# Patient Record
Sex: FEMALE | Race: BLACK OR AFRICAN AMERICAN | NOT HISPANIC OR LATINO | Employment: OTHER | URBAN - METROPOLITAN AREA
[De-identification: names, ages, dates, MRNs, and addresses within clinical notes are randomized per-mention and may not be internally consistent; named-entity substitution may affect disease eponyms.]

---

## 2024-08-26 ENCOUNTER — OFFICE VISIT (OUTPATIENT)
Dept: PRIMARY CARE | Facility: CLINIC | Age: 64
End: 2024-08-26
Payer: COMMERCIAL

## 2024-08-26 VITALS
WEIGHT: 126.8 LBS | HEART RATE: 88 BPM | SYSTOLIC BLOOD PRESSURE: 160 MMHG | RESPIRATION RATE: 16 BRPM | OXYGEN SATURATION: 99 % | TEMPERATURE: 97.2 F | BODY MASS INDEX: 23.34 KG/M2 | DIASTOLIC BLOOD PRESSURE: 82 MMHG | HEIGHT: 62 IN

## 2024-08-26 DIAGNOSIS — E78.5 DYSLIPIDEMIA: ICD-10-CM

## 2024-08-26 DIAGNOSIS — I10 HYPERTENSION, UNSPECIFIED TYPE: ICD-10-CM

## 2024-08-26 DIAGNOSIS — Z23 VACCINE FOR DIPHTHERIA-TETANUS: Primary | ICD-10-CM

## 2024-08-26 DIAGNOSIS — I25.85 CHRONIC CORONARY MICROVASCULAR DYSFUNCTION: ICD-10-CM

## 2024-08-26 DIAGNOSIS — Z12.31 BREAST CANCER SCREENING BY MAMMOGRAM: ICD-10-CM

## 2024-08-26 DIAGNOSIS — E11.69 TYPE 2 DIABETES MELLITUS WITH OTHER SPECIFIED COMPLICATION, WITHOUT LONG-TERM CURRENT USE OF INSULIN (MULTI): ICD-10-CM

## 2024-08-26 LAB
ALBUMIN SERPL BCP-MCNC: 4.1 G/DL (ref 3.4–5)
ALP SERPL-CCNC: 53 U/L (ref 33–136)
ALT SERPL W P-5'-P-CCNC: 7 U/L (ref 7–45)
ANION GAP SERPL CALC-SCNC: 14 MMOL/L (ref 10–20)
AST SERPL W P-5'-P-CCNC: 14 U/L (ref 9–39)
BASOPHILS # BLD AUTO: 0.02 X10*3/UL (ref 0–0.1)
BASOPHILS NFR BLD AUTO: 0.3 %
BILIRUB SERPL-MCNC: 0.3 MG/DL (ref 0–1.2)
BUN SERPL-MCNC: 28 MG/DL (ref 6–23)
CALCIUM SERPL-MCNC: 9.7 MG/DL (ref 8.6–10.6)
CHLORIDE SERPL-SCNC: 105 MMOL/L (ref 98–107)
CHOLEST SERPL-MCNC: 183 MG/DL (ref 0–199)
CHOLESTEROL/HDL RATIO: 3.6
CO2 SERPL-SCNC: 25 MMOL/L (ref 21–32)
CREAT SERPL-MCNC: 1.99 MG/DL (ref 0.5–1.05)
EGFRCR SERPLBLD CKD-EPI 2021: 27 ML/MIN/1.73M*2
EOSINOPHIL # BLD AUTO: 0.06 X10*3/UL (ref 0–0.7)
EOSINOPHIL NFR BLD AUTO: 1 %
ERYTHROCYTE [DISTWIDTH] IN BLOOD BY AUTOMATED COUNT: 13.9 % (ref 11.5–14.5)
EST. AVERAGE GLUCOSE BLD GHB EST-MCNC: 128 MG/DL
GLUCOSE SERPL-MCNC: 83 MG/DL (ref 74–99)
HBA1C MFR BLD: 6.1 %
HCT VFR BLD AUTO: 31.6 % (ref 36–46)
HDLC SERPL-MCNC: 51 MG/DL
HGB BLD-MCNC: 10.1 G/DL (ref 12–16)
IMM GRANULOCYTES # BLD AUTO: 0.02 X10*3/UL (ref 0–0.7)
IMM GRANULOCYTES NFR BLD AUTO: 0.3 % (ref 0–0.9)
LDLC SERPL CALC-MCNC: 103 MG/DL
LYMPHOCYTES # BLD AUTO: 2.05 X10*3/UL (ref 1.2–4.8)
LYMPHOCYTES NFR BLD AUTO: 35.6 %
MCH RBC QN AUTO: 30 PG (ref 26–34)
MCHC RBC AUTO-ENTMCNC: 32 G/DL (ref 32–36)
MCV RBC AUTO: 94 FL (ref 80–100)
MONOCYTES # BLD AUTO: 0.48 X10*3/UL (ref 0.1–1)
MONOCYTES NFR BLD AUTO: 8.3 %
NEUTROPHILS # BLD AUTO: 3.13 X10*3/UL (ref 1.2–7.7)
NEUTROPHILS NFR BLD AUTO: 54.5 %
NON HDL CHOLESTEROL: 132 MG/DL (ref 0–149)
NRBC BLD-RTO: 0 /100 WBCS (ref 0–0)
PLATELET # BLD AUTO: 174 X10*3/UL (ref 150–450)
POTASSIUM SERPL-SCNC: 4.2 MMOL/L (ref 3.5–5.3)
PROT SERPL-MCNC: 7.5 G/DL (ref 6.4–8.2)
RBC # BLD AUTO: 3.37 X10*6/UL (ref 4–5.2)
SODIUM SERPL-SCNC: 140 MMOL/L (ref 136–145)
TRIGL SERPL-MCNC: 147 MG/DL (ref 0–149)
TSH SERPL-ACNC: 1.85 MIU/L (ref 0.44–3.98)
VLDL: 29 MG/DL (ref 0–40)
WBC # BLD AUTO: 5.8 X10*3/UL (ref 4.4–11.3)

## 2024-08-26 PROCEDURE — 3044F HG A1C LEVEL LT 7.0%: CPT | Performed by: INTERNAL MEDICINE

## 2024-08-26 PROCEDURE — 4010F ACE/ARB THERAPY RXD/TAKEN: CPT | Performed by: INTERNAL MEDICINE

## 2024-08-26 PROCEDURE — 80053 COMPREHEN METABOLIC PANEL: CPT | Performed by: INTERNAL MEDICINE

## 2024-08-26 PROCEDURE — 99214 OFFICE O/P EST MOD 30 MIN: CPT | Performed by: INTERNAL MEDICINE

## 2024-08-26 PROCEDURE — 1159F MED LIST DOCD IN RCRD: CPT | Performed by: INTERNAL MEDICINE

## 2024-08-26 PROCEDURE — 90471 IMMUNIZATION ADMIN: CPT | Performed by: INTERNAL MEDICINE

## 2024-08-26 PROCEDURE — 3079F DIAST BP 80-89 MM HG: CPT | Performed by: INTERNAL MEDICINE

## 2024-08-26 PROCEDURE — 36415 COLL VENOUS BLD VENIPUNCTURE: CPT | Performed by: INTERNAL MEDICINE

## 2024-08-26 PROCEDURE — 80061 LIPID PANEL: CPT | Performed by: INTERNAL MEDICINE

## 2024-08-26 PROCEDURE — 3049F LDL-C 100-129 MG/DL: CPT | Performed by: INTERNAL MEDICINE

## 2024-08-26 PROCEDURE — 83036 HEMOGLOBIN GLYCOSYLATED A1C: CPT | Performed by: INTERNAL MEDICINE

## 2024-08-26 PROCEDURE — 1126F AMNT PAIN NOTED NONE PRSNT: CPT | Performed by: INTERNAL MEDICINE

## 2024-08-26 PROCEDURE — 99214 OFFICE O/P EST MOD 30 MIN: CPT | Mod: 25,GC | Performed by: INTERNAL MEDICINE

## 2024-08-26 PROCEDURE — 84443 ASSAY THYROID STIM HORMONE: CPT | Performed by: INTERNAL MEDICINE

## 2024-08-26 PROCEDURE — 85025 COMPLETE CBC W/AUTO DIFF WBC: CPT | Performed by: INTERNAL MEDICINE

## 2024-08-26 PROCEDURE — 3008F BODY MASS INDEX DOCD: CPT | Performed by: INTERNAL MEDICINE

## 2024-08-26 PROCEDURE — 3077F SYST BP >= 140 MM HG: CPT | Performed by: INTERNAL MEDICINE

## 2024-08-26 RX ORDER — VALSARTAN 80 MG/1
80 TABLET ORAL
COMMUNITY
Start: 2024-04-18 | End: 2024-08-26 | Stop reason: SDUPTHER

## 2024-08-26 RX ORDER — ATORVASTATIN CALCIUM 10 MG/1
10 TABLET, FILM COATED ORAL
Qty: 30 TABLET | Refills: 2 | Status: SHIPPED | OUTPATIENT
Start: 2024-08-26 | End: 2024-11-24

## 2024-08-26 RX ORDER — ASPIRIN 81 MG/1
81 TABLET ORAL
COMMUNITY
Start: 2023-12-07 | End: 2024-08-26 | Stop reason: SDUPTHER

## 2024-08-26 RX ORDER — ATORVASTATIN CALCIUM 10 MG/1
10 TABLET, FILM COATED ORAL
COMMUNITY
Start: 2023-10-28 | End: 2024-08-26 | Stop reason: SDUPTHER

## 2024-08-26 RX ORDER — ASPIRIN 81 MG/1
81 TABLET ORAL
Qty: 30 TABLET | Refills: 2 | Status: SHIPPED | OUTPATIENT
Start: 2024-08-26 | End: 2024-11-24

## 2024-08-26 RX ORDER — ISOSORBIDE MONONITRATE 30 MG/1
1 TABLET, EXTENDED RELEASE ORAL DAILY
COMMUNITY
Start: 2024-01-15 | End: 2024-08-26 | Stop reason: SDUPTHER

## 2024-08-26 RX ORDER — VALSARTAN 80 MG/1
80 TABLET ORAL
Qty: 30 TABLET | Refills: 2 | Status: SHIPPED | OUTPATIENT
Start: 2024-08-26 | End: 2024-11-24

## 2024-08-26 RX ORDER — EMPAGLIFLOZIN 10 MG/1
10 TABLET, FILM COATED ORAL
COMMUNITY
Start: 2023-10-28 | End: 2024-08-26 | Stop reason: SDUPTHER

## 2024-08-26 RX ORDER — EMPAGLIFLOZIN 10 MG/1
10 TABLET, FILM COATED ORAL
Qty: 30 TABLET | Refills: 2 | Status: SHIPPED | OUTPATIENT
Start: 2024-08-26 | End: 2024-11-24

## 2024-08-26 RX ORDER — ISOSORBIDE MONONITRATE 30 MG/1
30 TABLET, EXTENDED RELEASE ORAL DAILY
Qty: 30 TABLET | Refills: 2 | Status: SHIPPED | OUTPATIENT
Start: 2024-08-26 | End: 2024-11-24

## 2024-08-26 SDOH — ECONOMIC STABILITY: FOOD INSECURITY: WITHIN THE PAST 12 MONTHS, THE FOOD YOU BOUGHT JUST DIDN'T LAST AND YOU DIDN'T HAVE MONEY TO GET MORE.: SOMETIMES TRUE

## 2024-08-26 SDOH — ECONOMIC STABILITY: FOOD INSECURITY: WITHIN THE PAST 12 MONTHS, THE FOOD YOU BOUGHT JUST DIDN'T LAST AND YOU DIDN'T HAVE MONEY TO GET MORE.: NEVER TRUE

## 2024-08-26 SDOH — ECONOMIC STABILITY: FOOD INSECURITY: WITHIN THE PAST 12 MONTHS, YOU WORRIED THAT YOUR FOOD WOULD RUN OUT BEFORE YOU GOT MONEY TO BUY MORE.: NEVER TRUE

## 2024-08-26 SDOH — ECONOMIC STABILITY: FOOD INSECURITY: WITHIN THE PAST 12 MONTHS, YOU WORRIED THAT YOUR FOOD WOULD RUN OUT BEFORE YOU GOT MONEY TO BUY MORE.: SOMETIMES TRUE

## 2024-08-26 ASSESSMENT — PATIENT HEALTH QUESTIONNAIRE - PHQ9
SUM OF ALL RESPONSES TO PHQ9 QUESTIONS 1 AND 2: 0
1. LITTLE INTEREST OR PLEASURE IN DOING THINGS: NOT AT ALL
2. FEELING DOWN, DEPRESSED OR HOPELESS: NOT AT ALL

## 2024-08-26 ASSESSMENT — PAIN SCALES - GENERAL: PAINLEVEL: 0-NO PAIN

## 2024-08-26 ASSESSMENT — LIFESTYLE VARIABLES: HOW MANY STANDARD DRINKS CONTAINING ALCOHOL DO YOU HAVE ON A TYPICAL DAY: PATIENT DOES NOT DRINK

## 2024-08-26 ASSESSMENT — ENCOUNTER SYMPTOMS
OCCASIONAL FEELINGS OF UNSTEADINESS: 0
DEPRESSION: 0
LOSS OF SENSATION IN FEET: 0

## 2024-08-26 NOTE — PROGRESS NOTES
Chief complaint:    HPI:  Cornelia Macias is a 64 y.o. female history notable for CHF, HLD, DM2, CAD, depression, hepatitis C tx Mavyret 2018 with SVR (hepatitis C is cured, with last RNA in 2021 undetectable.)Here to establish care    Pt denies any CP, cough, fever, diarrhoea, dizziness, lightheadedness, weight loss (reports some weight gain)    She reports pain in both arms and legs, sometimes has some pinching sensation in the legs, but not in the feet, reports swelling in the Rt foot >Lt, not in the legs usually worse in the cold, improves with warmth and drinking warm water    Denies nausea, vomiting, abdominal pain, diarrhea, and constipation.    Medications:      Current Outpatient Medications:     aspirin 81 mg EC tablet, Take 1 tablet (81 mg) by mouth once daily., Disp: 30 tablet, Rfl: 2    atorvastatin (Lipitor) 10 mg tablet, Take 1 tablet (10 mg) by mouth once daily., Disp: 30 tablet, Rfl: 2    diphth,pertus,acell,,tetanus (BoostRIX) 2.5-8-5 Lf-mcg-Lf/0.5mL injection, Inject 0.5 mL into the muscle 1 time for 1 dose., Disp: 0.5 mL, Rfl: 0    isosorbide mononitrate ER (Imdur) 30 mg 24 hr tablet, Take 1 tablet (30 mg) by mouth once daily., Disp: 30 tablet, Rfl: 2    Jardiance 10 mg, Take 1 tablet (10 mg) by mouth once daily., Disp: 30 tablet, Rfl: 2    valsartan (Diovan) 80 mg tablet, Take 1 tablet (80 mg) by mouth once daily., Disp: 30 tablet, Rfl: 2    Allergies:  Allergies   Allergen Reactions    Lisinopril Cough       Social history:   reports that she has been smoking cigarettes. She has never used smokeless tobacco. She reports that she does not currently use alcohol. She reports that she does not use drugs.Smokes tobacco, a pack in 2 days, since 2016  No recent use of alcohol or IVDU for the past 35 years    Health maintenance:  Health Maintenance   Topic Date Due    Echocardiogram  Never done    Yearly Adult Physical  Never done    Bone Density Scan  Never done    Zoster Vaccines (1 of 2) Never done     MMR Vaccines (1 of 1 - Standard series) Never done    Hepatitis B Vaccines (1 of 3 - Risk 3-dose series) Never done    DTaP/Tdap/Td Vaccines (4 - Td or Tdap) 06/24/2020    Colorectal Cancer Screening  01/19/2024    COVID-19 Vaccine (5 - 2023-24 season) 04/07/2024    Influenza Vaccine (1) 09/01/2024    Mammogram  09/27/2024    Diabetes: Hemoglobin A1C  12/21/2024    Diabetes Screening  12/21/2024    Creatinine Level  06/17/2025    Potassium Level  06/17/2025    Lipid Panel  01/03/2028    RSV Pregnant patients and/or  patients aged 60+ years  Completed    Hepatitis A Vaccines  Completed    Pneumococcal Vaccine: 65+ Years  Completed    HIB Vaccines  Aged Out    IPV Vaccines  Aged Out    Meningococcal Vaccine  Aged Out    Rotavirus Vaccines  Aged Out    HPV Vaccines  Aged Out       Vitals:  Visit Vitals  /82   Pulse 88   Temp 36.2 °C (97.2 °F)   Resp 16       Physical exam:  CVS: S1 and S2 present , no added sounds,  Chest: Clinically clear  Abd: no areas of tenderness or distentention  Extremities: no pedal edema, pulse palpable  Neuro: A0X 3, No FND      Lab Results   Component Value Date    HGBA1C 6.2 (H) 12/21/2023       Assessment and plan:  Cornelia Macias is a 64 y.o. female here to establish care.     #Hx of CAD  #Ischemic cardiomyopathy   ::CAD 3-vessel disease cath 10/18/2023: mid LAD 85% + 90%; Diag#1 75%, midCirc 65%, midRCA 80%  ::LVH and old Septal MI by EKG  ::HFmEF 45% Echo 7/5/2023  ::Hyperlipidemia  on 10/19/2023,   :: Last Cardiologist visit, she did not have any symptoms, noted to be very high risk for surgery. Pt was not interested in any invasive procedure yet.   Plan:   -Will repeat labs (CBC, CMP, lipid profile)  -c/w Imdur, Jardiace, Atorvastatin  -follow up with Cardiology    # DM2  # HLD  ::BMI-23.19  ::BP-160/82   ::HBAIC- 6.2 (2023)  stable  ::On Jardiance 10mg, Imdur 30mg daily, Atorvastatin 10mg daily, Diovan 80mg daily, Aspirin 81 mg daily    Plan:  -c/w Home meds  -Follow  up with Cardiologist  -repeat CMP, HBA1C, lipid profile will need high intensity statin iso CAD    #CKD   ::Creat 2.016/17/2024)<<1.5  ::eGFR-27  ::Hb-10.5(6/17/2024)  Plan:  -Will follow up repeat labs      # History of Hepatitis C, cured  # History of liver fibrosis 2/2 above  - follows up with Gastroenterology (Fibroscan and Liver US ordered to evaluate for cirrhosis on last visit)  Plan:   -c/w Gastro follow up      HEALTH MAINTENANCE   Vaccines  Influenza: Nov 2023, advised to get next dose at pharmacy or on next visit here  Shingles: Ist shot 7/02, next shot 8/27   Tdap: 2010, due for a shot (given-8/26/2024)  Pneumonia: received PCV 20 11/20/2023  COVID: 2 doses in 2021  Cancer screening   Colonoscopy: FIT 2023, patient agreeable to colonoscopy, will discuss on next visit  Mammogram: 2023 , due requested  Low dose Chest CT: will discuss on next visit  DEXA scan:will discuss on next visit    Plan   CBC,CMP, HBA1C, Mammogram, Tdap vaccine  -Refill meds  Follow-up in 1mth    Patient and plan discussed with attending physician Dr Keisha Noel MD  [facility]

## 2024-09-23 ENCOUNTER — OFFICE VISIT (OUTPATIENT)
Dept: PRIMARY CARE | Facility: CLINIC | Age: 64
End: 2024-09-23
Payer: COMMERCIAL

## 2024-09-23 VITALS
SYSTOLIC BLOOD PRESSURE: 151 MMHG | TEMPERATURE: 98 F | DIASTOLIC BLOOD PRESSURE: 73 MMHG | WEIGHT: 128.6 LBS | BODY MASS INDEX: 23.66 KG/M2 | OXYGEN SATURATION: 98 % | HEART RATE: 102 BPM | HEIGHT: 62 IN | RESPIRATION RATE: 16 BRPM

## 2024-09-23 DIAGNOSIS — N18.30 TYPE 2 DIABETES MELLITUS WITH STAGE 3 CHRONIC KIDNEY DISEASE, WITHOUT LONG-TERM CURRENT USE OF INSULIN, UNSPECIFIED WHETHER STAGE 3A OR 3B CKD (MULTI): ICD-10-CM

## 2024-09-23 DIAGNOSIS — N18.4 CHRONIC KIDNEY DISEASE (CKD), STAGE IV (SEVERE) (MULTI): ICD-10-CM

## 2024-09-23 DIAGNOSIS — I42.9 CARDIOMYOPATHY, UNSPECIFIED TYPE (MULTI): ICD-10-CM

## 2024-09-23 DIAGNOSIS — B18.2 HEPATITIS C CARRIER (MULTI): ICD-10-CM

## 2024-09-23 DIAGNOSIS — I10 HYPERTENSION, UNSPECIFIED TYPE: ICD-10-CM

## 2024-09-23 DIAGNOSIS — I50.22 CHRONIC SYSTOLIC CHF (CONGESTIVE HEART FAILURE): ICD-10-CM

## 2024-09-23 DIAGNOSIS — R25.2 BILATERAL LEG CRAMPS: Primary | ICD-10-CM

## 2024-09-23 DIAGNOSIS — E78.2 MIXED HYPERLIPIDEMIA: ICD-10-CM

## 2024-09-23 DIAGNOSIS — E11.22 TYPE 2 DIABETES MELLITUS WITH STAGE 3 CHRONIC KIDNEY DISEASE, WITHOUT LONG-TERM CURRENT USE OF INSULIN, UNSPECIFIED WHETHER STAGE 3A OR 3B CKD (MULTI): ICD-10-CM

## 2024-09-23 DIAGNOSIS — Z23 NEEDS FLU SHOT: ICD-10-CM

## 2024-09-23 PROBLEM — I25.10 CORONARY ATHEROSCLEROSIS: Status: ACTIVE | Noted: 2023-10-18

## 2024-09-23 PROCEDURE — 3044F HG A1C LEVEL LT 7.0%: CPT | Performed by: INTERNAL MEDICINE

## 2024-09-23 PROCEDURE — 3008F BODY MASS INDEX DOCD: CPT | Performed by: INTERNAL MEDICINE

## 2024-09-23 PROCEDURE — 1125F AMNT PAIN NOTED PAIN PRSNT: CPT | Performed by: INTERNAL MEDICINE

## 2024-09-23 PROCEDURE — 3049F LDL-C 100-129 MG/DL: CPT | Performed by: INTERNAL MEDICINE

## 2024-09-23 PROCEDURE — 99214 OFFICE O/P EST MOD 30 MIN: CPT | Mod: 25 | Performed by: INTERNAL MEDICINE

## 2024-09-23 PROCEDURE — 3078F DIAST BP <80 MM HG: CPT | Performed by: INTERNAL MEDICINE

## 2024-09-23 PROCEDURE — 99214 OFFICE O/P EST MOD 30 MIN: CPT | Performed by: INTERNAL MEDICINE

## 2024-09-23 PROCEDURE — 3077F SYST BP >= 140 MM HG: CPT | Performed by: INTERNAL MEDICINE

## 2024-09-23 PROCEDURE — 4010F ACE/ARB THERAPY RXD/TAKEN: CPT | Performed by: INTERNAL MEDICINE

## 2024-09-23 PROCEDURE — 1160F RVW MEDS BY RX/DR IN RCRD: CPT | Performed by: INTERNAL MEDICINE

## 2024-09-23 PROCEDURE — 1159F MED LIST DOCD IN RCRD: CPT | Performed by: INTERNAL MEDICINE

## 2024-09-23 PROCEDURE — 90471 IMMUNIZATION ADMIN: CPT | Performed by: INTERNAL MEDICINE

## 2024-09-23 RX ORDER — CARVEDILOL 6.25 MG/1
6.25 TABLET ORAL 2 TIMES DAILY
Qty: 60 TABLET | Refills: 2 | Status: SHIPPED | OUTPATIENT
Start: 2024-09-23 | End: 2025-09-23

## 2024-09-23 SDOH — ECONOMIC STABILITY: FOOD INSECURITY: WITHIN THE PAST 12 MONTHS, YOU WORRIED THAT YOUR FOOD WOULD RUN OUT BEFORE YOU GOT MONEY TO BUY MORE.: SOMETIMES TRUE

## 2024-09-23 SDOH — ECONOMIC STABILITY: FOOD INSECURITY: WITHIN THE PAST 12 MONTHS, THE FOOD YOU BOUGHT JUST DIDN'T LAST AND YOU DIDN'T HAVE MONEY TO GET MORE.: SOMETIMES TRUE

## 2024-09-23 ASSESSMENT — ENCOUNTER SYMPTOMS
DEPRESSION: 0
CHILLS: 0
FEVER: 0
NAUSEA: 0
SHORTNESS OF BREATH: 0
ABDOMINAL PAIN: 0
OCCASIONAL FEELINGS OF UNSTEADINESS: 0
VOMITING: 0
LOSS OF SENSATION IN FEET: 0

## 2024-09-23 ASSESSMENT — PATIENT HEALTH QUESTIONNAIRE - PHQ9
1. LITTLE INTEREST OR PLEASURE IN DOING THINGS: NOT AT ALL
SUM OF ALL RESPONSES TO PHQ9 QUESTIONS 1 AND 2: 0
2. FEELING DOWN, DEPRESSED OR HOPELESS: NOT AT ALL

## 2024-09-23 ASSESSMENT — LIFESTYLE VARIABLES: HOW MANY STANDARD DRINKS CONTAINING ALCOHOL DO YOU HAVE ON A TYPICAL DAY: PATIENT DOES NOT DRINK

## 2024-09-23 ASSESSMENT — PAIN SCALES - GENERAL: PAINLEVEL: 5

## 2024-09-23 NOTE — PROGRESS NOTES
"Subjective   Patient ID: Cornelia Macias is a 64 y.o. female who presents for Follow-up (1 month).    Presents for 1 month follow up. Complains of cramping under her right breast and both feet. Happens most of the time when she is cold. Standing up eases the cramps in her feet. Does cramps in the bed at night. Takes the statin in the daytime.          Review of Systems   Constitutional:  Negative for chills and fever.   Respiratory:  Negative for shortness of breath.    Cardiovascular:  Negative for chest pain.   Gastrointestinal:  Negative for abdominal pain, nausea and vomiting.       Objective   /73   Pulse 102   Temp 36.7 °C (98 °F)   Resp 16   Ht 1.575 m (5' 2\")   Wt 58.3 kg (128 lb 9.6 oz)   SpO2 98%   BMI 23.52 kg/m²     Physical Exam  Gen appearance: well groomed in NAD  A & O: alert and oriented x 3  CV: RRR   Lungs: CTA bilaterally  Extr: no edema   Assessment/Plan   Bilateral fee/leg cramps: ? 2/2 statin.  Will hold for one week and reevaluate  CV: wants to establish care with Cards at   High chol: see #1  Mamm coming up  HTN: suboptimal. Will add Coreg  DM: cont med  RTO 1 week for bp check and follow up on the cramping  8.    CKD: refer to Renal     "

## 2024-09-30 ENCOUNTER — APPOINTMENT (OUTPATIENT)
Dept: RADIOLOGY | Facility: HOSPITAL | Age: 64
End: 2024-09-30
Payer: COMMERCIAL

## 2024-09-30 ENCOUNTER — APPOINTMENT (OUTPATIENT)
Dept: PRIMARY CARE | Facility: CLINIC | Age: 64
End: 2024-09-30
Payer: COMMERCIAL

## 2024-10-01 ENCOUNTER — APPOINTMENT (OUTPATIENT)
Dept: RADIOLOGY | Facility: HOSPITAL | Age: 64
End: 2024-10-01
Payer: COMMERCIAL

## 2024-10-03 ENCOUNTER — CLINICAL SUPPORT (OUTPATIENT)
Dept: NUTRITION | Facility: CLINIC | Age: 64
End: 2024-10-03
Payer: COMMERCIAL

## 2024-10-03 ENCOUNTER — OFFICE VISIT (OUTPATIENT)
Dept: PRIMARY CARE | Facility: CLINIC | Age: 64
End: 2024-10-03
Payer: COMMERCIAL

## 2024-10-03 VITALS
OXYGEN SATURATION: 99 % | WEIGHT: 131.2 LBS | RESPIRATION RATE: 18 BRPM | HEIGHT: 63 IN | HEART RATE: 87 BPM | DIASTOLIC BLOOD PRESSURE: 75 MMHG | SYSTOLIC BLOOD PRESSURE: 155 MMHG | TEMPERATURE: 97.4 F | BODY MASS INDEX: 23.25 KG/M2

## 2024-10-03 DIAGNOSIS — E78.2 MIXED HYPERLIPIDEMIA: Primary | ICD-10-CM

## 2024-10-03 DIAGNOSIS — I10 PRIMARY HYPERTENSION: ICD-10-CM

## 2024-10-03 PROCEDURE — 99214 OFFICE O/P EST MOD 30 MIN: CPT | Performed by: INTERNAL MEDICINE

## 2024-10-03 RX ORDER — CARVEDILOL 12.5 MG/1
12.5 TABLET ORAL 2 TIMES DAILY
Qty: 180 TABLET | Refills: 1 | Status: SHIPPED | OUTPATIENT
Start: 2024-10-03 | End: 2025-10-03

## 2024-10-03 RX ORDER — ATORVASTATIN CALCIUM 20 MG/1
20 TABLET, FILM COATED ORAL DAILY
Qty: 90 TABLET | Refills: 1 | Status: SHIPPED | OUTPATIENT
Start: 2024-10-03 | End: 2025-11-07

## 2024-10-03 ASSESSMENT — PAIN SCALES - GENERAL: PAINLEVEL: 8

## 2024-10-03 ASSESSMENT — ENCOUNTER SYMPTOMS
LOSS OF SENSATION IN FEET: 0
FEVER: 0
NAUSEA: 0
ABDOMINAL PAIN: 0
CHILLS: 0
DEPRESSION: 0
SHORTNESS OF BREATH: 0
VOMITING: 0
OCCASIONAL FEELINGS OF UNSTEADINESS: 1

## 2024-10-03 ASSESSMENT — PATIENT HEALTH QUESTIONNAIRE - PHQ9
1. LITTLE INTEREST OR PLEASURE IN DOING THINGS: SEVERAL DAYS
SUM OF ALL RESPONSES TO PHQ9 QUESTIONS 1 AND 2: 2
10. IF YOU CHECKED OFF ANY PROBLEMS, HOW DIFFICULT HAVE THESE PROBLEMS MADE IT FOR YOU TO DO YOUR WORK, TAKE CARE OF THINGS AT HOME, OR GET ALONG WITH OTHER PEOPLE: SOMEWHAT DIFFICULT
2. FEELING DOWN, DEPRESSED OR HOPELESS: SEVERAL DAYS

## 2024-10-03 NOTE — PROGRESS NOTES
"Subjective   Patient ID: Cornelia Macias is a 64 y.o. female who presents for Follow-up.    Presents for bp recheck. Still getting \"spasms\" even after stopping the statin. Still feels cold all the time.          Review of Systems   Constitutional:  Negative for chills and fever.   Respiratory:  Negative for shortness of breath.    Cardiovascular:  Negative for chest pain.   Gastrointestinal:  Negative for abdominal pain, nausea and vomiting.       Objective   /75   Pulse 87   Temp 36.3 °C (97.4 °F)   Resp 18   Ht 1.595 m (5' 2.8\")   Wt 59.5 kg (131 lb 3.2 oz)   SpO2 99%   BMI 23.39 kg/m²     Physical Exam  Gen appearance: well groomed in NAD  A & O: alert and oriented x 3  CV: RRR   Lungs: CTA bilaterally  Extr: no edema   Assessment/Plan   HTN: increase Coreg  2. High chol: increase statin  3. DM: cont med  4. Spent > 35 minutes with chart review, discussing CVD risk score and hoiw to lower it, assessment and plan.  5. Received Shingrix and Prevnar at [facility] 9/23/24  6. RTO 3 weeks for bp check           "

## 2024-10-03 NOTE — PROGRESS NOTES
[employer]  Diet Recommendation 1: Healthy Eating  Food Intolerance Avoidance: NKFA  Household Size: 1 Family Member  Interventions: Referral Number: 1st 6 Mo Referral 6 Mos  Interventions: Visit Number: 1 of 6 Visits - Max 6 Visits/Referral Each 6 Mo Period  Grains: 25-50% Whole  Fruit: % Fresh  Vegetables: % Fresh  Proteins: 1-2 Plant-based Items  Dairy: Lowfat - 100%  Originating Site of Referral Order: [facility]  Initials of RD Assisting Today: MARGARET

## 2024-10-23 ENCOUNTER — OFFICE VISIT (OUTPATIENT)
Dept: PRIMARY CARE | Facility: CLINIC | Age: 64
End: 2024-10-23
Payer: COMMERCIAL

## 2024-10-23 VITALS
SYSTOLIC BLOOD PRESSURE: 133 MMHG | TEMPERATURE: 98.1 F | WEIGHT: 129.2 LBS | DIASTOLIC BLOOD PRESSURE: 78 MMHG | RESPIRATION RATE: 16 BRPM | OXYGEN SATURATION: 98 % | HEIGHT: 62 IN | BODY MASS INDEX: 23.77 KG/M2 | HEART RATE: 88 BPM

## 2024-10-23 DIAGNOSIS — I10 PRIMARY HYPERTENSION: Primary | ICD-10-CM

## 2024-10-23 PROCEDURE — 99213 OFFICE O/P EST LOW 20 MIN: CPT | Performed by: INTERNAL MEDICINE

## 2024-10-23 PROCEDURE — 4010F ACE/ARB THERAPY RXD/TAKEN: CPT | Performed by: INTERNAL MEDICINE

## 2024-10-23 PROCEDURE — 3044F HG A1C LEVEL LT 7.0%: CPT | Performed by: INTERNAL MEDICINE

## 2024-10-23 PROCEDURE — 1125F AMNT PAIN NOTED PAIN PRSNT: CPT | Performed by: INTERNAL MEDICINE

## 2024-10-23 PROCEDURE — 3049F LDL-C 100-129 MG/DL: CPT | Performed by: INTERNAL MEDICINE

## 2024-10-23 PROCEDURE — 3075F SYST BP GE 130 - 139MM HG: CPT | Performed by: INTERNAL MEDICINE

## 2024-10-23 PROCEDURE — 3078F DIAST BP <80 MM HG: CPT | Performed by: INTERNAL MEDICINE

## 2024-10-23 PROCEDURE — 3008F BODY MASS INDEX DOCD: CPT | Performed by: INTERNAL MEDICINE

## 2024-10-23 PROCEDURE — 1159F MED LIST DOCD IN RCRD: CPT | Performed by: INTERNAL MEDICINE

## 2024-10-23 PROCEDURE — 1160F RVW MEDS BY RX/DR IN RCRD: CPT | Performed by: INTERNAL MEDICINE

## 2024-10-23 SDOH — ECONOMIC STABILITY: FOOD INSECURITY: WITHIN THE PAST 12 MONTHS, THE FOOD YOU BOUGHT JUST DIDN'T LAST AND YOU DIDN'T HAVE MONEY TO GET MORE.: SOMETIMES TRUE

## 2024-10-23 SDOH — ECONOMIC STABILITY: FOOD INSECURITY: WITHIN THE PAST 12 MONTHS, YOU WORRIED THAT YOUR FOOD WOULD RUN OUT BEFORE YOU GOT MONEY TO BUY MORE.: SOMETIMES TRUE

## 2024-10-23 ASSESSMENT — PATIENT HEALTH QUESTIONNAIRE - PHQ9
SUM OF ALL RESPONSES TO PHQ9 QUESTIONS 1 AND 2: 0
2. FEELING DOWN, DEPRESSED OR HOPELESS: NOT AT ALL
1. LITTLE INTEREST OR PLEASURE IN DOING THINGS: NOT AT ALL

## 2024-10-23 ASSESSMENT — LIFESTYLE VARIABLES: HOW MANY STANDARD DRINKS CONTAINING ALCOHOL DO YOU HAVE ON A TYPICAL DAY: PATIENT DOES NOT DRINK

## 2024-10-23 ASSESSMENT — ENCOUNTER SYMPTOMS
LOSS OF SENSATION IN FEET: 0
ABDOMINAL PAIN: 0
SHORTNESS OF BREATH: 0
FEVER: 0
CHILLS: 0
NAUSEA: 0
VOMITING: 0
DEPRESSION: 0
OCCASIONAL FEELINGS OF UNSTEADINESS: 0

## 2024-10-23 ASSESSMENT — PAIN SCALES - GENERAL: PAINLEVEL_OUTOF10: 5

## 2024-10-23 NOTE — PROGRESS NOTES
"Subjective   Patient ID: Cornelia Macias is a 64 y.o. female who presents for Follow-up.    Presents for follow up of bp. States is taking all meds as prescribed now.          Review of Systems   Constitutional:  Negative for chills and fever.   Respiratory:  Negative for shortness of breath.    Cardiovascular:  Negative for chest pain.   Gastrointestinal:  Negative for abdominal pain, nausea and vomiting.       Objective   /78   Pulse 88   Temp 36.7 °C (98.1 °F)   Resp 16   Ht 1.575 m (5' 2\")   Wt 58.6 kg (129 lb 3.2 oz)   SpO2 98%   BMI 23.63 kg/m²     Physical Exam  Gen appearance: well groomed in NAD  A & O: alert and oriented x 3  CV: RRR   Lungs: CTA bilaterally  Extr: no edema   Assessment/Plan   HTN: cont meds  RTO 6 mos       "

## 2025-01-21 DIAGNOSIS — I25.85 CHRONIC CORONARY MICROVASCULAR DYSFUNCTION: ICD-10-CM

## 2025-01-21 RX ORDER — VALSARTAN 80 MG/1
80 TABLET ORAL
Qty: 90 TABLET | Refills: 1 | Status: SHIPPED | OUTPATIENT
Start: 2025-01-21 | End: 2025-07-20

## 2025-01-24 DIAGNOSIS — I25.85 CHRONIC CORONARY MICROVASCULAR DYSFUNCTION: ICD-10-CM

## 2025-01-24 RX ORDER — ISOSORBIDE MONONITRATE 30 MG/1
30 TABLET, EXTENDED RELEASE ORAL DAILY
Qty: 90 TABLET | Refills: 1 | Status: SHIPPED | OUTPATIENT
Start: 2025-01-24 | End: 2025-07-23

## 2025-01-30 ENCOUNTER — TELEPHONE (OUTPATIENT)
Dept: PRIMARY CARE | Facility: CLINIC | Age: 65
End: 2025-01-30
Payer: COMMERCIAL

## 2025-01-30 DIAGNOSIS — R79.89 LOW VITAMIN D LEVEL: Primary | ICD-10-CM

## 2025-01-30 RX ORDER — CHOLECALCIFEROL (VITAMIN D3) 25 MCG
1000 TABLET ORAL DAILY
Qty: 90 TABLET | Refills: 1 | Status: SHIPPED | OUTPATIENT
Start: 2025-01-30 | End: 2026-01-30

## 2025-01-30 NOTE — TELEPHONE ENCOUNTER
Patient called requesting a prescription for Vitamin D 3. Patient states she was on metformin for 30 years it weakened her bones and she broke 2 bones in the feet,so vitamin d was recommended to her.

## 2025-02-10 ENCOUNTER — TELEPHONE (OUTPATIENT)
Dept: PRIMARY CARE | Facility: CLINIC | Age: 65
End: 2025-02-10
Payer: COMMERCIAL

## 2025-02-10 DIAGNOSIS — I25.85 CHRONIC CORONARY MICROVASCULAR DYSFUNCTION: ICD-10-CM

## 2025-02-10 NOTE — TELEPHONE ENCOUNTER
Patient called stating that the medication she was prescribed is causing her to have body pain, nausea and it is affecting her kidneys. Patient also stated she usually only takes isosorbide not isosorbide mononitrate.

## 2025-02-24 PROBLEM — F17.200 SMOKER: Status: ACTIVE | Noted: 2023-10-28

## 2025-03-18 ENCOUNTER — OFFICE VISIT (OUTPATIENT)
Dept: CARDIOLOGY | Facility: HOSPITAL | Age: 65
End: 2025-03-18
Payer: COMMERCIAL

## 2025-03-18 VITALS
BODY MASS INDEX: 24.55 KG/M2 | DIASTOLIC BLOOD PRESSURE: 71 MMHG | WEIGHT: 133.4 LBS | OXYGEN SATURATION: 98 % | HEART RATE: 84 BPM | HEIGHT: 62 IN | SYSTOLIC BLOOD PRESSURE: 131 MMHG

## 2025-03-18 DIAGNOSIS — I25.119 CORONARY ARTERY DISEASE INVOLVING NATIVE CORONARY ARTERY OF NATIVE HEART WITH ANGINA PECTORIS: ICD-10-CM

## 2025-03-18 DIAGNOSIS — I25.10 HEART FAILURE WITH REDUCED EJECTION FRACTION DUE TO CORONARY ARTERY DISEASE: ICD-10-CM

## 2025-03-18 DIAGNOSIS — I50.20 HEART FAILURE WITH REDUCED EJECTION FRACTION DUE TO CORONARY ARTERY DISEASE: ICD-10-CM

## 2025-03-18 DIAGNOSIS — I10 PRIMARY HYPERTENSION: Primary | ICD-10-CM

## 2025-03-18 PROCEDURE — 3008F BODY MASS INDEX DOCD: CPT | Performed by: STUDENT IN AN ORGANIZED HEALTH CARE EDUCATION/TRAINING PROGRAM

## 2025-03-18 PROCEDURE — 99205 OFFICE O/P NEW HI 60 MIN: CPT | Performed by: STUDENT IN AN ORGANIZED HEALTH CARE EDUCATION/TRAINING PROGRAM

## 2025-03-18 PROCEDURE — 99215 OFFICE O/P EST HI 40 MIN: CPT | Performed by: STUDENT IN AN ORGANIZED HEALTH CARE EDUCATION/TRAINING PROGRAM

## 2025-03-18 PROCEDURE — 1125F AMNT PAIN NOTED PAIN PRSNT: CPT | Performed by: STUDENT IN AN ORGANIZED HEALTH CARE EDUCATION/TRAINING PROGRAM

## 2025-03-18 PROCEDURE — 1159F MED LIST DOCD IN RCRD: CPT | Performed by: STUDENT IN AN ORGANIZED HEALTH CARE EDUCATION/TRAINING PROGRAM

## 2025-03-18 PROCEDURE — 93005 ELECTROCARDIOGRAM TRACING: CPT | Performed by: STUDENT IN AN ORGANIZED HEALTH CARE EDUCATION/TRAINING PROGRAM

## 2025-03-18 PROCEDURE — 3078F DIAST BP <80 MM HG: CPT | Performed by: STUDENT IN AN ORGANIZED HEALTH CARE EDUCATION/TRAINING PROGRAM

## 2025-03-18 PROCEDURE — 4010F ACE/ARB THERAPY RXD/TAKEN: CPT | Performed by: STUDENT IN AN ORGANIZED HEALTH CARE EDUCATION/TRAINING PROGRAM

## 2025-03-18 PROCEDURE — 3048F LDL-C <100 MG/DL: CPT | Performed by: STUDENT IN AN ORGANIZED HEALTH CARE EDUCATION/TRAINING PROGRAM

## 2025-03-18 PROCEDURE — 3075F SYST BP GE 130 - 139MM HG: CPT | Performed by: STUDENT IN AN ORGANIZED HEALTH CARE EDUCATION/TRAINING PROGRAM

## 2025-03-18 PROCEDURE — 3044F HG A1C LEVEL LT 7.0%: CPT | Performed by: STUDENT IN AN ORGANIZED HEALTH CARE EDUCATION/TRAINING PROGRAM

## 2025-03-18 RX ORDER — ASPIRIN 81 MG/1
81 TABLET ORAL DAILY
Status: ON HOLD | COMMUNITY

## 2025-03-18 ASSESSMENT — PAIN SCALES - GENERAL: PAINLEVEL_OUTOF10: 8

## 2025-03-18 NOTE — PROGRESS NOTES
[facility] - General Cardiology Note                                                                                        Reason for Visit: New Patient Visit.  Referring Clinician: No ref. provider found     History of Present Illness  Cornelia Macias is a 64 y.o. female history of heart failure with midrange EF 45% range, coronary artery disease, type 2 diabetes, stage IV CKD, hyperlipidemia, who presents to [facility] care.     List of Active Cardiac Issues:  # Triple-vessel coronary artery disease: Patient was worked-up at the [facility] in October 2023.  Coronary angiogram showed multivessel CAD with mid LAD lesion of 85 and 90%, diagonal 1 to 75%, mid RCA of 80%.  Patient was recommended to undergo CABG but she did not want to do so at that point.  She is currently on aspirin 81 mg daily and atorvastatin 20 mg daily.    #Stage B cardiomyopathy, midrange EF of 45%: Likely ischemic in the setting of triple-vessel CAD.  No history of heart failure exacerbation.  She is currently on carvedilol 12.5 mg twice daily, Imdur 30 mg daily, valsartan 80 mg daily, and Jardiance 10 mg daily.    #Type 2 diabetes: Patient is currently on Jardiance 10 mg daily.  She had this in her back in clinic.  She is not on metformin as it gave her abdominal discomfort.    Interval History:  Patient presents today with her friend.  She is very emotional.  She reports that she experiences chest discomfort that is sometimes related to exertion.  The chest discomfort is described as a band underneath the breast tissue.  She is unsure of alleviating or exacerbating symptoms.  Patient is teary in clinic today and reports that a doctor told her that she needed bypass surgery.  She is also afraid that she might need a kidney transplant given her chronic kidney disease.    Past Medical History  She has no past medical history on file.    Past Surgical History  She has no past surgical  "history on file.    Social History  She reports that she has been smoking cigarettes. She has never used smokeless tobacco. She reports that she does not currently use alcohol. She reports that she does not use drugs.    Family History  No family history on file.    Allergies  Lisinopril    Outpatient Medications  Current Outpatient Medications   Medication Instructions    aspirin 81 mg, Daily    atorvastatin (LIPITOR) 20 mg, oral, Daily    carvedilol (COREG) 12.5 mg, oral, 2 times daily    cholecalciferol (VITAMIN D3) 1,000 Units, oral, Daily    isosorbide mononitrate ER (IMDUR) 30 mg, oral, Daily    Jardiance 10 mg, oral, Daily RT    valsartan (DIOVAN) 80 mg, oral, Daily RT       Review of Systems  Review of Systems   Cardiovascular:  Positive for chest pain and dyspnea on exertion. Negative for claudication, leg swelling, near-syncope, orthopnea, palpitations and paroxysmal nocturnal dyspnea.   Respiratory:  Positive for shortness of breath.        Last Recorded Vitals  Vitals:    03/18/25 1348   BP: 131/71   BP Location: Right arm   Patient Position: Sitting   Pulse: 84   SpO2: 98%   Weight: 60.5 kg (133 lb 6.4 oz)   Height: 1.575 m (5' 2\")       Physical Examination  General: Well appearing, well-nourished, in no acute distress.  HEENT: Normocephalic atraumatic, pupils equal and reactive to light, extraocular muscles intact, no conjunctival injection, oropharynx clear without exudates.  Neck: Normal carotid arterial pulses, no arterial bruits, no thyromegaly.  Cardiac: Regular rhythm and normal heart rate.  S1, S2 present and normal.  No murmurs, rubs, or gallops.  PMI is nondisplaced. Jugular venous pulsations are normal.  Pulmonary: Normal breath sounds, no increased work of breathing, no wheezes or crackles.  GI: Normal bowel sounds, abdominal aorta not enlarged, no hepatosplenomegaly, no abdominal bruits.  Lower extremities: No cyanosis, clubbing, or edema.  No xanthelasma present. Normal distal " "pulses.  Skin: Skin intact. No significant rashes or lesions present.  Neuro: Alert and oriented x 3, normal attention and cognition, no focal motor or sensory neurologic deficits.  Psych: Normal affect and mood.  Musculoskeletal: Normal gait normal muscle tone.    Laboratory Studies  Lab Results   Component Value Date    GLUCOSE 83 08/26/2024    CALCIUM 9.7 08/26/2024     08/26/2024    K 4.2 08/26/2024    CO2 25 08/26/2024     08/26/2024    BUN 28 (H) 08/26/2024    CREATININE 1.99 (H) 08/26/2024     Lab Results   Component Value Date    ALT 7 08/26/2024    AST 14 08/26/2024    ALKPHOS 53 08/26/2024    BILITOT 0.3 08/26/2024         Lab Results   Component Value Date    CHOL 183 08/26/2024     Lab Results   Component Value Date    HDL 51.0 08/26/2024     Lab Results   Component Value Date    LDLCALC 103 (H) 08/26/2024     Lab Results   Component Value Date    TRIG 147 08/26/2024     No components found for: \"CHOLHDL\"  Lab Results   Component Value Date    HGBA1C 6.1 (H) 08/26/2024     No components found for: \"UACR\"    Cardiology Tests    I personally reviewed the imaging studies and reviewed them with the patient       Assessment and Plan  Cornelia Macias is a 64 y.o. female history of heart failure with midrange EF 45% range, coronary artery disease, type 2 diabetes, stage IV CKD, hyperlipidemia, who presents to establish care. Patient has triple vessel CAD on coronary angio with vague exertional symptoms that could be related to that. We discussed with the patient hospital admission for potential CABG. She is agreeable. Admission was discussed with Dr. Barajas     List of Active Cardiac Issues:  # Triple-vessel coronary artery disease: Patient was worked-up at the [facility] in October 2023.  Coronary angiogram showed multivessel CAD with mid LAD lesion of 85 and 90%, diagonal 1 to 75%, mid RCA of 80%.  Patient was recommended to undergo CABG but she did not want to do so at that point.  She is " currently on aspirin 81 mg daily and atorvastatin 20 mg daily.  - Continnue ASA 81   - Increase atorvastatin to 80 mg   - Lipid profile (showed LDL of 85 with TC of 165).   -     #Stage B cardiomyopathy, midrange EF of 45%: Likely ischemic in the setting of triple-vessel CAD.  No history of heart failure exacerbation.  She is currently on carvedilol 12.5 mg twice daily, Imdur 30 mg daily, valsartan 80 mg daily, and Jardiance 10 mg daily.    #Type 2 diabetes: Patient is currently on Jardiance 10 mg daily.  She had this in her back in clinic.  She is not on metformin as it gave her abdominal discomfort. Continue jardaince 10 mg daily     Problem List Items Addressed This Visit          Cardiac and Vasculature    Hypertension - Primary             Arden Amador MD

## 2025-03-19 ENCOUNTER — HOSPITAL ENCOUNTER (INPATIENT)
Facility: HOSPITAL | Age: 65
DRG: 234 | End: 2025-03-19
Attending: INTERNAL MEDICINE | Admitting: INTERNAL MEDICINE
Payer: COMMERCIAL

## 2025-03-19 DIAGNOSIS — I25.10 CAD, MULTIPLE VESSEL: Primary | ICD-10-CM

## 2025-03-19 DIAGNOSIS — R06.02 SHORTNESS OF BREATH: ICD-10-CM

## 2025-03-19 DIAGNOSIS — D64.9 POSTOPERATIVE ANEMIA: ICD-10-CM

## 2025-03-19 DIAGNOSIS — R07.9 CHEST PAIN, UNSPECIFIED: ICD-10-CM

## 2025-03-19 DIAGNOSIS — I25.118 ATHEROSCLEROTIC HEART DISEASE OF NATIVE CORONARY ARTERY WITH OTHER FORMS OF ANGINA PECTORIS: ICD-10-CM

## 2025-03-19 DIAGNOSIS — R09.89 OTHER SPECIFIED SYMPTOMS AND SIGNS INVOLVING THE CIRCULATORY AND RESPIRATORY SYSTEMS: ICD-10-CM

## 2025-03-19 DIAGNOSIS — I50.22 CHRONIC SYSTOLIC CHF (CONGESTIVE HEART FAILURE): ICD-10-CM

## 2025-03-19 DIAGNOSIS — R93.1 ABNORMAL FINDINGS ON DIAGNOSTIC IMAGING OF HEART AND CORONARY CIRCULATION: ICD-10-CM

## 2025-03-19 DIAGNOSIS — I25.5 ISCHEMIC CARDIOMYOPATHY: ICD-10-CM

## 2025-03-19 DIAGNOSIS — I79.8 OTHER DISORDERS OF ARTERIES, ARTERIOLES AND CAPILLARIES IN DISEASES CLASSIFIED ELSEWHERE: ICD-10-CM

## 2025-03-19 DIAGNOSIS — Z01.810 ENCOUNTER FOR PREPROCEDURAL CARDIOVASCULAR EXAMINATION: ICD-10-CM

## 2025-03-19 DIAGNOSIS — I73.9 PERIPHERAL VASCULAR DISEASE, UNSPECIFIED (CMS-HCC): ICD-10-CM

## 2025-03-19 DIAGNOSIS — Z95.1 S/P CABG X 3: ICD-10-CM

## 2025-03-19 LAB
ABO GROUP (TYPE) IN BLOOD: NORMAL
ALBUMIN SERPL BCP-MCNC: 4 G/DL (ref 3.4–5)
ALBUMIN SERPL-MCNC: 4.3 G/DL (ref 3.6–5.1)
ALP SERPL-CCNC: 45 U/L (ref 33–136)
ALP SERPL-CCNC: 45 U/L (ref 37–153)
ALT SERPL W P-5'-P-CCNC: 7 U/L (ref 7–45)
ALT SERPL-CCNC: 8 U/L (ref 6–29)
ANION GAP SERPL CALC-SCNC: 13 MMOL/L (ref 10–20)
ANION GAP SERPL CALCULATED.4IONS-SCNC: 7 MMOL/L (CALC) (ref 7–17)
ANTIBODY SCREEN: NORMAL
APTT PPP: 26 SECONDS (ref 26–36)
AST SERPL W P-5'-P-CCNC: 16 U/L (ref 9–39)
AST SERPL-CCNC: 17 U/L (ref 10–35)
BILIRUB DIRECT SERPL-MCNC: 0.1 MG/DL (ref 0–0.3)
BILIRUB SERPL-MCNC: 0.3 MG/DL (ref 0.2–1.2)
BILIRUB SERPL-MCNC: 0.4 MG/DL (ref 0–1.2)
BNP SERPL-MCNC: 41 PG/ML (ref 0–99)
BUN SERPL-MCNC: 26 MG/DL (ref 6–23)
BUN SERPL-MCNC: 31 MG/DL (ref 7–25)
CALCIUM SERPL-MCNC: 9.6 MG/DL (ref 8.6–10.4)
CALCIUM SERPL-MCNC: 9.8 MG/DL (ref 8.6–10.6)
CHLORIDE SERPL-SCNC: 105 MMOL/L (ref 98–110)
CHLORIDE SERPL-SCNC: 108 MMOL/L (ref 98–107)
CHOLEST SERPL-MCNC: 165 MG/DL (ref 0–199)
CHOLESTEROL/HDL RATIO: 3.8
CK SERPL-CCNC: 102 U/L (ref 0–215)
CO2 SERPL-SCNC: 26 MMOL/L (ref 21–32)
CO2 SERPL-SCNC: 28 MMOL/L (ref 20–32)
CREAT SERPL-MCNC: 1.94 MG/DL (ref 0.5–1.05)
CREAT SERPL-MCNC: 2.07 MG/DL (ref 0.5–1.05)
EGFRCR SERPLBLD CKD-EPI 2021: 26 ML/MIN/1.73M*2
EGFRCR SERPLBLD CKD-EPI 2021: 28 ML/MIN/1.73M2
ERYTHROCYTE [DISTWIDTH] IN BLOOD BY AUTOMATED COUNT: 12.9 % (ref 11–15)
ERYTHROCYTE [DISTWIDTH] IN BLOOD BY AUTOMATED COUNT: 13.2 % (ref 11.5–14.5)
EST. AVERAGE GLUCOSE BLD GHB EST-MCNC: 140 MG/DL
GLUCOSE BLD MANUAL STRIP-MCNC: 180 MG/DL (ref 74–99)
GLUCOSE SERPL-MCNC: 165 MG/DL (ref 74–99)
GLUCOSE SERPL-MCNC: 90 MG/DL (ref 65–99)
HBA1C MFR BLD: 6.5 %
HCT VFR BLD AUTO: 31.1 % (ref 36–46)
HCT VFR BLD AUTO: 32.6 % (ref 35–45)
HDLC SERPL-MCNC: 43.7 MG/DL
HGB BLD-MCNC: 10.1 G/DL (ref 12–16)
HGB BLD-MCNC: 10.8 G/DL (ref 11.7–15.5)
INR PPP: 1 (ref 0.9–1.1)
LDLC SERPL CALC-MCNC: 85 MG/DL
MCH RBC QN AUTO: 30.2 PG (ref 26–34)
MCH RBC QN AUTO: 30.8 PG (ref 27–33)
MCHC RBC AUTO-ENTMCNC: 32.5 G/DL (ref 32–36)
MCHC RBC AUTO-ENTMCNC: 33.1 G/DL (ref 32–36)
MCV RBC AUTO: 92.9 FL (ref 80–100)
MCV RBC AUTO: 93 FL (ref 80–100)
NON HDL CHOLESTEROL: 121 MG/DL (ref 0–149)
NRBC BLD-RTO: 0 /100 WBCS (ref 0–0)
PLATELET # BLD AUTO: 173 X10*3/UL (ref 150–450)
PLATELET # BLD AUTO: 181 THOUSAND/UL (ref 140–400)
PMV BLD REES-ECKER: 11.9 FL (ref 7.5–12.5)
POTASSIUM SERPL-SCNC: 3.9 MMOL/L (ref 3.5–5.3)
POTASSIUM SERPL-SCNC: 4.4 MMOL/L (ref 3.5–5.3)
PROT SERPL-MCNC: 7.1 G/DL (ref 6.4–8.2)
PROT SERPL-MCNC: 7.6 G/DL (ref 6.1–8.1)
PROTHROMBIN TIME: 11.2 SECONDS (ref 9.8–12.4)
RBC # BLD AUTO: 3.34 X10*6/UL (ref 4–5.2)
RBC # BLD AUTO: 3.51 MILLION/UL (ref 3.8–5.1)
RH FACTOR (ANTIGEN D): NORMAL
SODIUM SERPL-SCNC: 140 MMOL/L (ref 135–146)
SODIUM SERPL-SCNC: 143 MMOL/L (ref 136–145)
TRIGL SERPL-MCNC: 183 MG/DL (ref 0–149)
TSH SERPL-ACNC: 2.59 MIU/L (ref 0.44–3.98)
VLDL: 37 MG/DL (ref 0–40)
WBC # BLD AUTO: 4.9 THOUSAND/UL (ref 3.8–10.8)
WBC # BLD AUTO: 5.7 X10*3/UL (ref 4.4–11.3)

## 2025-03-19 PROCEDURE — 86850 RBC ANTIBODY SCREEN: CPT

## 2025-03-19 PROCEDURE — 83718 ASSAY OF LIPOPROTEIN: CPT

## 2025-03-19 PROCEDURE — 2500000001 HC RX 250 WO HCPCS SELF ADMINISTERED DRUGS (ALT 637 FOR MEDICARE OP)

## 2025-03-19 PROCEDURE — 84075 ASSAY ALKALINE PHOSPHATASE: CPT

## 2025-03-19 PROCEDURE — 83880 ASSAY OF NATRIURETIC PEPTIDE: CPT

## 2025-03-19 PROCEDURE — 85610 PROTHROMBIN TIME: CPT

## 2025-03-19 PROCEDURE — 81001 URINALYSIS AUTO W/SCOPE: CPT

## 2025-03-19 PROCEDURE — 36415 COLL VENOUS BLD VENIPUNCTURE: CPT

## 2025-03-19 PROCEDURE — 84443 ASSAY THYROID STIM HORMONE: CPT

## 2025-03-19 PROCEDURE — 2500000004 HC RX 250 GENERAL PHARMACY W/ HCPCS (ALT 636 FOR OP/ED)

## 2025-03-19 PROCEDURE — 82550 ASSAY OF CK (CPK): CPT

## 2025-03-19 PROCEDURE — 83036 HEMOGLOBIN GLYCOSYLATED A1C: CPT

## 2025-03-19 PROCEDURE — 1200000002 HC GENERAL ROOM WITH TELEMETRY DAILY

## 2025-03-19 PROCEDURE — 80048 BASIC METABOLIC PNL TOTAL CA: CPT

## 2025-03-19 PROCEDURE — 82947 ASSAY GLUCOSE BLOOD QUANT: CPT

## 2025-03-19 PROCEDURE — 85027 COMPLETE CBC AUTOMATED: CPT

## 2025-03-19 RX ORDER — VALSARTAN 80 MG/1
80 TABLET ORAL
Status: DISCONTINUED | OUTPATIENT
Start: 2025-03-20 | End: 2025-03-22

## 2025-03-19 RX ORDER — CARVEDILOL 12.5 MG/1
12.5 TABLET ORAL 2 TIMES DAILY
Status: DISCONTINUED | OUTPATIENT
Start: 2025-03-19 | End: 2025-03-24

## 2025-03-19 RX ORDER — HEPARIN SODIUM 5000 [USP'U]/ML
5000 INJECTION, SOLUTION INTRAVENOUS; SUBCUTANEOUS EVERY 8 HOURS
Status: DISCONTINUED | OUTPATIENT
Start: 2025-03-19 | End: 2025-03-26

## 2025-03-19 RX ORDER — HEPARIN SODIUM 5000 [USP'U]/ML
5000 INJECTION, SOLUTION INTRAVENOUS; SUBCUTANEOUS EVERY 8 HOURS
Status: DISCONTINUED | OUTPATIENT
Start: 2025-03-19 | End: 2025-03-19

## 2025-03-19 RX ORDER — ATORVASTATIN CALCIUM 20 MG/1
20 TABLET, FILM COATED ORAL DAILY
Status: DISCONTINUED | OUTPATIENT
Start: 2025-03-19 | End: 2025-03-19

## 2025-03-19 RX ORDER — POLYETHYLENE GLYCOL 3350 17 G/17G
17 POWDER, FOR SOLUTION ORAL DAILY
Status: DISCONTINUED | OUTPATIENT
Start: 2025-03-19 | End: 2025-03-19

## 2025-03-19 RX ORDER — POLYETHYLENE GLYCOL 3350 17 G/17G
17 POWDER, FOR SOLUTION ORAL DAILY
Status: DISCONTINUED | OUTPATIENT
Start: 2025-03-19 | End: 2025-03-26

## 2025-03-19 RX ORDER — ATORVASTATIN CALCIUM 40 MG/1
40 TABLET, FILM COATED ORAL NIGHTLY
Status: DISCONTINUED | OUTPATIENT
Start: 2025-03-19 | End: 2025-03-26

## 2025-03-19 RX ORDER — ASPIRIN 81 MG/1
81 TABLET ORAL DAILY
Status: DISCONTINUED | OUTPATIENT
Start: 2025-03-19 | End: 2025-03-26

## 2025-03-19 RX ORDER — CHOLECALCIFEROL (VITAMIN D3) 25 MCG
1000 TABLET ORAL DAILY
Status: DISCONTINUED | OUTPATIENT
Start: 2025-03-19 | End: 2025-04-01 | Stop reason: HOSPADM

## 2025-03-19 RX ORDER — ISOSORBIDE MONONITRATE 30 MG/1
30 TABLET, EXTENDED RELEASE ORAL DAILY
Status: DISCONTINUED | OUTPATIENT
Start: 2025-03-19 | End: 2025-03-26

## 2025-03-19 RX ADMIN — HEPARIN SODIUM 5000 UNITS: 5000 INJECTION, SOLUTION INTRAVENOUS; SUBCUTANEOUS at 21:00

## 2025-03-19 RX ADMIN — CHOLECALCIFEROL TAB 25 MCG (1000 UNIT) 1000 UNITS: 25 TAB at 20:59

## 2025-03-19 RX ADMIN — CARVEDILOL 12.5 MG: 12.5 TABLET, FILM COATED ORAL at 20:59

## 2025-03-19 RX ADMIN — ASPIRIN 81 MG: 81 TABLET, COATED ORAL at 20:58

## 2025-03-19 SDOH — SOCIAL STABILITY: SOCIAL INSECURITY: ARE YOU OR HAVE YOU BEEN THREATENED OR ABUSED PHYSICALLY, EMOTIONALLY, OR SEXUALLY BY ANYONE?: NO

## 2025-03-19 SDOH — SOCIAL STABILITY: SOCIAL INSECURITY: HAS ANYONE EVER THREATENED TO HURT YOUR FAMILY OR YOUR PETS?: NO

## 2025-03-19 SDOH — SOCIAL STABILITY: SOCIAL INSECURITY: WERE YOU ABLE TO COMPLETE ALL THE BEHAVIORAL HEALTH SCREENINGS?: YES

## 2025-03-19 SDOH — SOCIAL STABILITY: SOCIAL INSECURITY: HAVE YOU HAD THOUGHTS OF HARMING ANYONE ELSE?: NO

## 2025-03-19 SDOH — SOCIAL STABILITY: SOCIAL INSECURITY: DOES ANYONE TRY TO KEEP YOU FROM HAVING/CONTACTING OTHER FRIENDS OR DOING THINGS OUTSIDE YOUR HOME?: NO

## 2025-03-19 SDOH — SOCIAL STABILITY: SOCIAL INSECURITY: HAVE YOU HAD ANY THOUGHTS OF HARMING ANYONE ELSE?: NO

## 2025-03-19 SDOH — SOCIAL STABILITY: SOCIAL INSECURITY: DO YOU FEEL UNSAFE GOING BACK TO THE PLACE WHERE YOU ARE LIVING?: NO

## 2025-03-19 SDOH — SOCIAL STABILITY: SOCIAL INSECURITY: DO YOU FEEL ANYONE HAS EXPLOITED OR TAKEN ADVANTAGE OF YOU FINANCIALLY OR OF YOUR PERSONAL PROPERTY?: NO

## 2025-03-19 SDOH — SOCIAL STABILITY: SOCIAL INSECURITY: ABUSE: ADULT

## 2025-03-19 SDOH — SOCIAL STABILITY: SOCIAL INSECURITY: ARE THERE ANY APPARENT SIGNS OF INJURIES/BEHAVIORS THAT COULD BE RELATED TO ABUSE/NEGLECT?: NO

## 2025-03-19 ASSESSMENT — ACTIVITIES OF DAILY LIVING (ADL)
FEEDING YOURSELF: INDEPENDENT
JUDGMENT_ADEQUATE_SAFELY_COMPLETE_DAILY_ACTIVITIES: YES
WALKS IN HOME: INDEPENDENT
HEARING - RIGHT EAR: FUNCTIONAL
BATHING: INDEPENDENT
LACK_OF_TRANSPORTATION: NO
GROOMING: INDEPENDENT
PATIENT'S MEMORY ADEQUATE TO SAFELY COMPLETE DAILY ACTIVITIES?: YES
ADEQUATE_TO_COMPLETE_ADL: YES
DRESSING YOURSELF: NEEDS ASSISTANCE
HEARING - LEFT EAR: FUNCTIONAL
TOILETING: INDEPENDENT

## 2025-03-19 ASSESSMENT — COLUMBIA-SUICIDE SEVERITY RATING SCALE - C-SSRS
1. IN THE PAST MONTH, HAVE YOU WISHED YOU WERE DEAD OR WISHED YOU COULD GO TO SLEEP AND NOT WAKE UP?: NO
6. HAVE YOU EVER DONE ANYTHING, STARTED TO DO ANYTHING, OR PREPARED TO DO ANYTHING TO END YOUR LIFE?: NO
2. HAVE YOU ACTUALLY HAD ANY THOUGHTS OF KILLING YOURSELF?: NO

## 2025-03-19 ASSESSMENT — LIFESTYLE VARIABLES
AUDIT-C TOTAL SCORE: 0
HOW OFTEN DO YOU HAVE 6 OR MORE DRINKS ON ONE OCCASION: NEVER
HOW OFTEN DO YOU HAVE A DRINK CONTAINING ALCOHOL: NEVER
SKIP TO QUESTIONS 9-10: 1
HOW MANY STANDARD DRINKS CONTAINING ALCOHOL DO YOU HAVE ON A TYPICAL DAY: PATIENT DOES NOT DRINK
AUDIT-C TOTAL SCORE: 0

## 2025-03-19 ASSESSMENT — COGNITIVE AND FUNCTIONAL STATUS - GENERAL
DRESSING REGULAR LOWER BODY CLOTHING: A LITTLE
MOBILITY SCORE: 24
PATIENT BASELINE BEDBOUND: NO
DAILY ACTIVITIY SCORE: 23

## 2025-03-19 ASSESSMENT — PAIN - FUNCTIONAL ASSESSMENT: PAIN_FUNCTIONAL_ASSESSMENT: 0-10

## 2025-03-19 ASSESSMENT — PATIENT HEALTH QUESTIONNAIRE - PHQ9
1. LITTLE INTEREST OR PLEASURE IN DOING THINGS: NOT AT ALL
SUM OF ALL RESPONSES TO PHQ9 QUESTIONS 1 & 2: 0
2. FEELING DOWN, DEPRESSED OR HOPELESS: NOT AT ALL

## 2025-03-19 ASSESSMENT — PAIN SCALES - GENERAL: PAINLEVEL_OUTOF10: 7

## 2025-03-19 NOTE — H&P
HISTORY & PHYSICAL      Cornelia Macias  :  1960(64 y.o.)  MRN:  [id number]    Date: 25     H&P     Chief Complaint: CABG eval  PCP: Margaret Valdes MD     HPI:  Cornelia Macias is a 64 y.o. female history notable for CHF, HLD, DM2, CAD, depression, hepatitis C tx Mavyret  with SVR (hepatitis C is cured, with last RNA in  undetectable, presents for CABG eval. Found to have multivessel disease 10/23 (mid LAD 85% + 90%; Diag#1 75%, midCirc 65%, midRCA 80%) planned for CABG then, but patient opted for medical management. Today, patient denies CP SOB, LH, dizziness, palpitations, syncope, n/v, and abdominal pain. Denies CP or SOB when climbing stairs or playing golf. Just endorses general lack of energy. Follows Dr. Amador, cardiologist, at [facility].     Pertinent ROS per HPI    The patient's vital signs:  Heart Rate:  [85] 85  Resp:  [18] 18  BP: (137)/(78) 137/78     EKG: @No results found for this or any previous visit.No results found for this or any previous visit.    Labs:   Lab Results   Component Value Date     2025    K 4.4 2025     2025    CO2 28 2025    BUN 31 (H) 2025    CREATININE 1.94 (H) 2025    GLUCOSE 90 2025    CALCIUM 9.6 2025    PROT 7.6 2025    BILITOT 0.3 2025    ALKPHOS 45 2025    AST 17 2025    ALT 8 2025       Lab Results   Component Value Date    WBC 4.9 2025    HGB 10.8 (L) 2025    HCT 32.6 (L) 2025    MCV 92.9 2025     2025             Surgical Hx  Recent Surgeries in Internal Medicine            No cases to display              Family Hx  No family history on file.     Social Hx  Social History     Socioeconomic History    Marital status: Single   Tobacco Use    Smoking status: Every Day     Types: Cigarettes    Smokeless tobacco: Never   Substance and Sexual Activity    Alcohol use: Not Currently    Drug use: Never     Social Drivers of Health      Financial Resource Strain: High Risk (11/20/2023)    Received from [facility]    Overall Financial Resource Strain (CARDIA)     Difficulty of Paying Living Expenses: Hard   Food Insecurity: Food Insecurity Present (10/23/2024)    Hunger Vital Sign     Worried About Running Out of Food in the Last Year: Sometimes true     Ran Out of Food in the Last Year: Sometimes true   Transportation Needs: No Transportation Needs (11/20/2023)    Received from [facility]    PRAPARE - Transportation     Lack of Transportation (Medical): No     Lack of Transportation (Non-Medical): No   Physical Activity: Inactive (11/20/2023)    Received from [facility]    Exercise Vital Sign     Days of Exercise per Week: 0 days     Minutes of Exercise per Session: 0 min   Stress: Stress Concern Present (11/20/2023)    Received from [facility]    [place] [employer] - Occupational Stress Questionnaire     Feeling of Stress : To some extent   Social Connections: Moderately Isolated (11/20/2023)    Received from [facility]    Social Connection and Isolation Panel [NHANES]     Frequency of Communication with Friends and Family: More than three times a week     Frequency of Social Gatherings with Friends and Family: More than three times a week     Attends Mosque Services: More than 4 times per year     Active Member of Clubs or Organizations: No     Attends Club or Organization Meetings: Never     Marital Status: Never    Intimate Partner Violence: Not At Risk (11/20/2023)    Received from [facility]    Humiliation, Afraid, Rape, and Kick questionnaire     Fear of Current or Ex-Partner: No     Emotionally Abused: No     Physically Abused: No     Sexually Abused: No      Allergies  Allergies   Allergen Reactions    Lisinopril Cough      Prior to Admission medications    Medication Sig Start Date End Date Taking? Authorizing Provider   aspirin 81 mg EC tablet Take 1 tablet (81 mg) by mouth once daily.     Historical Provider, MD   atorvastatin (Lipitor) 20 mg tablet Take 1 tablet (20 mg) by mouth once daily. 10/3/24 11/7/25  Margaret Valdes MD   carvedilol (Coreg) 12.5 mg tablet Take 1 tablet (12.5 mg) by mouth 2 times a day. 10/3/24 10/3/25  Margaret Valdes MD   cholecalciferol (Vitamin D3) 25 MCG (1000 UT) tablet Take 1 tablet (1,000 Units) by mouth once daily. 1/30/25 1/30/26  Margaret Valdes MD   isosorbide mononitrate ER (Imdur) 30 mg 24 hr tablet Take 1 tablet (30 mg) by mouth once daily. 1/24/25 7/23/25  Margaret Valdes MD   Jardiance 10 mg Take 1 tablet (10 mg) by mouth once daily. 8/26/24 3/18/25  Juliana Noel MD   valsartan (Diovan) 80 mg tablet Take 1 tablet (80 mg) by mouth once daily. 1/21/25 7/20/25  Margaret Valdes MD     Physical Exam  Constitutional:       Appearance: Normal appearance.   Cardiovascular:      Rate and Rhythm: Normal rate and regular rhythm.      Pulses: Normal pulses.      Heart sounds: Normal heart sounds.   Pulmonary:      Effort: Pulmonary effort is normal.      Breath sounds: Normal breath sounds.   Abdominal:      General: Abdomen is flat.   Musculoskeletal:      Cervical back: Normal range of motion.   Skin:     General: Skin is warm.   Neurological:      Mental Status: She is alert.              Assessment and Plan:    Cornelia Macias is a 64 y.o. female history notable for CHF, HLD, DM2, CAD, depression, hepatitis C tx Mavyret 2018 with SVR (hepatitis C is cured, with last RNA in 2021 undetectable, presents for CABG eval. LHC 10/23 was significant for triple vessel diease  (mid LAD 85% + 90%; Diag#1 75%, midCirc 65%, midRCA 80%). TTE 07/23 significant for EF 45%. Patient doing well, no CP or SOB at this time. Ordered LFTs, HgbA1c, TSH/T4, Lipid panel, CBC, RFP, Coags, BNP and TTE. Plan to consult cardiac surgery team. Appreciate recommendations. Admit to floor.     #Triple vessel CAD  #CABG eval    - Ordered TTE   - cont Imdur 30   - cont aspirin 81   -  increased atorvastatin 20 mg to 40 mg   - LFTs, HgbA1c, TSH/T4, Lipid panel, CBC, RFP, Coags, BNP pending   - consult CT surgery in AM     #HFmrEF  - held Jardiance and valsartan iso of upcoming CABG   - cont Coreg 12.5 BID   - repeat TTE     #DM2  - Last A1c 6.1% in 08/24  - Not currently on medications at home   - Pending repeat A1c        F: PRN  E: PRN  Diet: Adult diet Regular   A: PIV  Lines: no  O2: RA  DVT ppx: lovenox  GI ppx: PPI  Code Status: Full Code   Contact: Extended Emergency Contact Information  Primary Emergency Contact: colleen hutchinson  Mobile Phone: [phone]  Relation: Daughter  Preferred language: English   needed? No       Electronically signed by Carol Iniguez MD on 03/19/25 at 8:41 PM

## 2025-03-20 ENCOUNTER — APPOINTMENT (OUTPATIENT)
Dept: CARDIOLOGY | Facility: HOSPITAL | Age: 65
DRG: 234 | End: 2025-03-20
Payer: COMMERCIAL

## 2025-03-20 ENCOUNTER — APPOINTMENT (OUTPATIENT)
Dept: CARDIOLOGY | Facility: HOSPITAL | Age: 65
End: 2025-03-20
Payer: COMMERCIAL

## 2025-03-20 LAB
ALBUMIN SERPL BCP-MCNC: 3.9 G/DL (ref 3.4–5)
ANION GAP SERPL CALC-SCNC: 12 MMOL/L (ref 10–20)
AORTIC VALVE PEAK VELOCITY: 1.01 M/S
APPEARANCE UR: CLEAR
ATRIAL RATE: 80 BPM
AV PEAK GRADIENT: 4 MMHG
AVA (PEAK VEL): 1.92 CM2
BASOPHILS # BLD AUTO: 0.01 X10*3/UL (ref 0–0.1)
BASOPHILS NFR BLD AUTO: 0.2 %
BILIRUB UR STRIP.AUTO-MCNC: NEGATIVE MG/DL
BUN SERPL-MCNC: 23 MG/DL (ref 6–23)
CALCIUM SERPL-MCNC: 9.7 MG/DL (ref 8.6–10.6)
CARDIAC TROPONIN I PNL SERPL HS: 45 NG/L (ref 0–34)
CHLORIDE SERPL-SCNC: 105 MMOL/L (ref 98–107)
CO2 SERPL-SCNC: 25 MMOL/L (ref 21–32)
COLOR UR: ABNORMAL
CREAT SERPL-MCNC: 1.86 MG/DL (ref 0.5–1.05)
EGFRCR SERPLBLD CKD-EPI 2021: 30 ML/MIN/1.73M*2
EJECTION FRACTION APICAL 4 CHAMBER: 47.8
EJECTION FRACTION: 53 %
EOSINOPHIL # BLD AUTO: 0.08 X10*3/UL (ref 0–0.7)
EOSINOPHIL NFR BLD AUTO: 1.6 %
ERYTHROCYTE [DISTWIDTH] IN BLOOD BY AUTOMATED COUNT: 13.7 % (ref 11.5–14.5)
GLUCOSE BLD MANUAL STRIP-MCNC: 105 MG/DL (ref 74–99)
GLUCOSE BLD MANUAL STRIP-MCNC: 111 MG/DL (ref 74–99)
GLUCOSE BLD MANUAL STRIP-MCNC: 72 MG/DL (ref 74–99)
GLUCOSE BLD MANUAL STRIP-MCNC: 97 MG/DL (ref 74–99)
GLUCOSE SERPL-MCNC: 78 MG/DL (ref 74–99)
GLUCOSE UR STRIP.AUTO-MCNC: ABNORMAL MG/DL
HCT VFR BLD AUTO: 32.6 % (ref 36–46)
HGB BLD-MCNC: 10.2 G/DL (ref 12–16)
HOLD SPECIMEN: NORMAL
IMM GRANULOCYTES # BLD AUTO: 0.01 X10*3/UL (ref 0–0.7)
IMM GRANULOCYTES NFR BLD AUTO: 0.2 % (ref 0–0.9)
KETONES UR STRIP.AUTO-MCNC: NEGATIVE MG/DL
LEFT ATRIUM VOLUME AREA LENGTH INDEX BSA: 22.1 ML/M2
LEFT VENTRICLE INTERNAL DIMENSION DIASTOLE: 4.2 CM (ref 3.5–6)
LEFT VENTRICULAR OUTFLOW TRACT DIAMETER: 1.8 CM
LEUKOCYTE ESTERASE UR QL STRIP.AUTO: NEGATIVE
LYMPHOCYTES # BLD AUTO: 2.79 X10*3/UL (ref 1.2–4.8)
LYMPHOCYTES NFR BLD AUTO: 55.7 %
MAGNESIUM SERPL-MCNC: 2.56 MG/DL (ref 1.6–2.4)
MCH RBC QN AUTO: 29.8 PG (ref 26–34)
MCHC RBC AUTO-ENTMCNC: 31.3 G/DL (ref 32–36)
MCV RBC AUTO: 95 FL (ref 80–100)
MITRAL VALVE E/A RATIO: 0.82
MONOCYTES # BLD AUTO: 0.55 X10*3/UL (ref 0.1–1)
MONOCYTES NFR BLD AUTO: 11 %
NEUTROPHILS # BLD AUTO: 1.57 X10*3/UL (ref 1.2–7.7)
NEUTROPHILS NFR BLD AUTO: 31.3 %
NITRITE UR QL STRIP.AUTO: NEGATIVE
NRBC BLD-RTO: 0 /100 WBCS (ref 0–0)
P AXIS: 65 DEGREES
P OFFSET: 210 MS
P ONSET: 151 MS
PH UR STRIP.AUTO: 6 [PH]
PHOSPHATE SERPL-MCNC: 3.2 MG/DL (ref 2.5–4.9)
PLATELET # BLD AUTO: 175 X10*3/UL (ref 150–450)
POTASSIUM SERPL-SCNC: 4 MMOL/L (ref 3.5–5.3)
PR INTERVAL: 148 MS
PROT UR STRIP.AUTO-MCNC: ABNORMAL MG/DL
Q ONSET: 225 MS
QRS COUNT: 14 BEATS
QRS DURATION: 72 MS
QT INTERVAL: 344 MS
QTC CALCULATION(BAZETT): 396 MS
QTC FREDERICIA: 378 MS
R AXIS: 26 DEGREES
RBC # BLD AUTO: 3.42 X10*6/UL (ref 4–5.2)
RBC # UR STRIP.AUTO: NEGATIVE MG/DL
RBC #/AREA URNS AUTO: NORMAL /HPF
RIGHT VENTRICLE FREE WALL PEAK S': 9.57 CM/S
RIGHT VENTRICLE PEAK SYSTOLIC PRESSURE: 25.3 MMHG
SODIUM SERPL-SCNC: 138 MMOL/L (ref 136–145)
SP GR UR STRIP.AUTO: 1.02
T AXIS: 269 DEGREES
T OFFSET: 397 MS
TRICUSPID ANNULAR PLANE SYSTOLIC EXCURSION: 1.9 CM
UROBILINOGEN UR STRIP.AUTO-MCNC: NORMAL MG/DL
VENTRICULAR RATE: 80 BPM
WBC # BLD AUTO: 5 X10*3/UL (ref 4.4–11.3)
WBC #/AREA URNS AUTO: NORMAL /HPF

## 2025-03-20 PROCEDURE — 99223 1ST HOSP IP/OBS HIGH 75: CPT | Performed by: INTERNAL MEDICINE

## 2025-03-20 PROCEDURE — 83735 ASSAY OF MAGNESIUM: CPT

## 2025-03-20 PROCEDURE — 80069 RENAL FUNCTION PANEL: CPT

## 2025-03-20 PROCEDURE — 93306 TTE W/DOPPLER COMPLETE: CPT | Performed by: INTERNAL MEDICINE

## 2025-03-20 PROCEDURE — 84484 ASSAY OF TROPONIN QUANT: CPT

## 2025-03-20 PROCEDURE — 99233 SBSQ HOSP IP/OBS HIGH 50: CPT | Performed by: PHYSICIAN ASSISTANT

## 2025-03-20 PROCEDURE — 93010 ELECTROCARDIOGRAM REPORT: CPT | Performed by: INTERNAL MEDICINE

## 2025-03-20 PROCEDURE — 85025 COMPLETE CBC W/AUTO DIFF WBC: CPT

## 2025-03-20 PROCEDURE — 82947 ASSAY GLUCOSE BLOOD QUANT: CPT

## 2025-03-20 PROCEDURE — 1200000002 HC GENERAL ROOM WITH TELEMETRY DAILY

## 2025-03-20 PROCEDURE — 93306 TTE W/DOPPLER COMPLETE: CPT

## 2025-03-20 PROCEDURE — 36415 COLL VENOUS BLD VENIPUNCTURE: CPT

## 2025-03-20 PROCEDURE — 2500000004 HC RX 250 GENERAL PHARMACY W/ HCPCS (ALT 636 FOR OP/ED)

## 2025-03-20 PROCEDURE — 2500000001 HC RX 250 WO HCPCS SELF ADMINISTERED DRUGS (ALT 637 FOR MEDICARE OP)

## 2025-03-20 PROCEDURE — 93005 ELECTROCARDIOGRAM TRACING: CPT

## 2025-03-20 RX ORDER — DEXTROSE 50 % IN WATER (D50W) INTRAVENOUS SYRINGE
25
Status: DISCONTINUED | OUTPATIENT
Start: 2025-03-20 | End: 2025-03-26

## 2025-03-20 RX ORDER — BLOOD PRESSURE TEST KIT-WRIST
1 KIT MISCELLANEOUS DAILY
COMMUNITY
End: 2025-04-01 | Stop reason: HOSPADM

## 2025-03-20 RX ORDER — DEXTROSE 50 % IN WATER (D50W) INTRAVENOUS SYRINGE
12.5
Status: DISCONTINUED | OUTPATIENT
Start: 2025-03-20 | End: 2025-03-26

## 2025-03-20 RX ORDER — INSULIN LISPRO 100 [IU]/ML
0-5 INJECTION, SOLUTION INTRAVENOUS; SUBCUTANEOUS
Status: DISCONTINUED | OUTPATIENT
Start: 2025-03-20 | End: 2025-03-26

## 2025-03-20 RX ADMIN — POLYETHYLENE GLYCOL 3350 17 G: 17 POWDER, FOR SOLUTION ORAL at 10:33

## 2025-03-20 RX ADMIN — CHOLECALCIFEROL TAB 25 MCG (1000 UNIT) 1000 UNITS: 25 TAB at 10:30

## 2025-03-20 RX ADMIN — HEPARIN SODIUM 5000 UNITS: 5000 INJECTION, SOLUTION INTRAVENOUS; SUBCUTANEOUS at 05:37

## 2025-03-20 RX ADMIN — HEPARIN SODIUM 5000 UNITS: 5000 INJECTION, SOLUTION INTRAVENOUS; SUBCUTANEOUS at 12:32

## 2025-03-20 RX ADMIN — HEPARIN SODIUM 5000 UNITS: 5000 INJECTION, SOLUTION INTRAVENOUS; SUBCUTANEOUS at 21:24

## 2025-03-20 RX ADMIN — CARVEDILOL 12.5 MG: 12.5 TABLET, FILM COATED ORAL at 10:31

## 2025-03-20 RX ADMIN — ASPIRIN 81 MG: 81 TABLET, COATED ORAL at 10:30

## 2025-03-20 RX ADMIN — CARVEDILOL 12.5 MG: 12.5 TABLET, FILM COATED ORAL at 21:24

## 2025-03-20 RX ADMIN — ISOSORBIDE MONONITRATE 30 MG: 30 TABLET, EXTENDED RELEASE ORAL at 10:30

## 2025-03-20 RX ADMIN — ATORVASTATIN CALCIUM 40 MG: 40 TABLET, FILM COATED ORAL at 21:24

## 2025-03-20 SDOH — SOCIAL STABILITY: SOCIAL INSECURITY
WITHIN THE LAST YEAR, HAVE YOU BEEN RAPED OR FORCED TO HAVE ANY KIND OF SEXUAL ACTIVITY BY YOUR PARTNER OR EX-PARTNER?: NO

## 2025-03-20 SDOH — ECONOMIC STABILITY: INCOME INSECURITY: IN THE PAST 12 MONTHS HAS THE ELECTRIC, GAS, OIL, OR WATER COMPANY THREATENED TO SHUT OFF SERVICES IN YOUR HOME?: NO

## 2025-03-20 SDOH — ECONOMIC STABILITY: FOOD INSECURITY: WITHIN THE PAST 12 MONTHS, YOU WORRIED THAT YOUR FOOD WOULD RUN OUT BEFORE YOU GOT THE MONEY TO BUY MORE.: NEVER TRUE

## 2025-03-20 SDOH — SOCIAL STABILITY: SOCIAL INSECURITY: WITHIN THE LAST YEAR, HAVE YOU BEEN AFRAID OF YOUR PARTNER OR EX-PARTNER?: NO

## 2025-03-20 SDOH — ECONOMIC STABILITY: FOOD INSECURITY: WITHIN THE PAST 12 MONTHS, THE FOOD YOU BOUGHT JUST DIDN'T LAST AND YOU DIDN'T HAVE MONEY TO GET MORE.: NEVER TRUE

## 2025-03-20 SDOH — SOCIAL STABILITY: SOCIAL INSECURITY: WITHIN THE LAST YEAR, HAVE YOU BEEN HUMILIATED OR EMOTIONALLY ABUSED IN OTHER WAYS BY YOUR PARTNER OR EX-PARTNER?: NO

## 2025-03-20 SDOH — SOCIAL STABILITY: SOCIAL INSECURITY
WITHIN THE LAST YEAR, HAVE YOU BEEN KICKED, HIT, SLAPPED, OR OTHERWISE PHYSICALLY HURT BY YOUR PARTNER OR EX-PARTNER?: NO

## 2025-03-20 ASSESSMENT — ENCOUNTER SYMPTOMS
NEAR-SYNCOPE: 0
PND: 0
ENDOCRINE NEGATIVE: 1
NEUROLOGICAL NEGATIVE: 1
CLAUDICATION: 0
SHORTNESS OF BREATH: 1
CONSTITUTIONAL NEGATIVE: 1
CARDIOVASCULAR NEGATIVE: 1
RESPIRATORY NEGATIVE: 1
MUSCULOSKELETAL NEGATIVE: 1
EYES NEGATIVE: 1
PALPITATIONS: 0
ORTHOPNEA: 0
GASTROINTESTINAL NEGATIVE: 1
DYSPNEA ON EXERTION: 1

## 2025-03-20 ASSESSMENT — COGNITIVE AND FUNCTIONAL STATUS - GENERAL
DAILY ACTIVITIY SCORE: 24
DAILY ACTIVITIY SCORE: 24
MOBILITY SCORE: 24
DAILY ACTIVITIY SCORE: 23
MOBILITY SCORE: 24
MOBILITY SCORE: 24
DRESSING REGULAR LOWER BODY CLOTHING: A LITTLE

## 2025-03-20 ASSESSMENT — PAIN - FUNCTIONAL ASSESSMENT: PAIN_FUNCTIONAL_ASSESSMENT: 0-10

## 2025-03-20 ASSESSMENT — PAIN SCALES - GENERAL
PAINLEVEL_OUTOF10: 4
PAINLEVEL_OUTOF10: 0 - NO PAIN

## 2025-03-20 ASSESSMENT — ACTIVITIES OF DAILY LIVING (ADL): LACK_OF_TRANSPORTATION: NO

## 2025-03-20 NOTE — PROGRESS NOTES
Subjective  Ms. Burk is sitting up in bed this morning.  States that she is feeling well overall.  Says that she has not experienced any chest pain.  Just describes some shortness of breath primarily with exertion.  Denies PND, orthopnea, lower extremity edema. Has intermittent lower extremity pain with exertion.     Objective     Vital signs:  Temp:  [36.4 °C (97.5 °F)-37 °C (98.6 °F)] 36.4 °C (97.5 °F)  Heart Rate:  [70-85] 70  Resp:  [16-18] 16  BP: (122-137)/(73-78) 136/76     Recent Labs:  Results from last 72 hours   Lab Units 03/20/25 0814 03/19/25 2110 03/18/25  1540   [facility] SODIUM mmol/L  --   --  140   SODIUM mmol/L 138 143  --    [facility] POTASSIUM mmol/L  --   --  4.4   POTASSIUM mmol/L 4.0 3.9  --    [facility] CHLORIDE mmol/L  --   --  105   CHLORIDE mmol/L 105 108*  --    [facility] CO2 mmol/L  --   --  28   CO2 mmol/L 25 26  --    BUN mg/dL 23 26*  --    [facility] BUN mg/dL  --   --  31*   CREATININE mg/dL 1.86* 2.07*  --    [facility] CREATININE mg/dL  --   --  1.94*   [facility] GLUCOSE mg/dL  --   --  90   GLUCOSE mg/dL 78 165*  --    [facility] CALCIUM mg/dL  --   --  9.6   CALCIUM mg/dL 9.7 9.8  --    [facility] ANION GAP mmol/L (calc)  --   --  7   ANION GAP mmol/L 12 13  --    [facility] EGFR mL/min/1.73m2  --   --  28*   EGFR mL/min/1.73m*2 30* 26*  --    PHOSPHORUS mg/dL 3.2  --   --       Results from last 72 hours   Lab Units 03/20/25 0814 03/19/25 2110 03/18/25  1540   WBC AUTO x10*3/uL 5.0 5.7  --    [facility] WBC AUTO [place]/uL  --   --  4.9   HEMOGLOBIN g/dL 10.2* 10.1*  --    [facility] HEMOGLOBIN g/dL  --   --  10.8*   HEMATOCRIT % 32.6* 31.1*  --    [facility] HEMATOCRIT %  --   --  32.6*   PLATELETS AUTO x10*3/uL 175 173  --    [facility] PLATELETS AUTO [place]/uL  --   --  181   NEUTROS PCT AUTO % 31.3  --   --    LYMPHS PCT AUTO % 55.7  --   --    MONOS PCT AUTO % 11.0  --   --    EOS PCT AUTO % 1.6  --   --                  Recent Imaging:  Transthoracic Echo (TTE) Complete  Result Date: 3/20/2025    CONCLUSIONS:   1. Left  ventricular ejection fraction is low normal, by visual estimate at 50-55%.   2. Spectral Doppler shows a Grade I (impaired relaxation pattern) of left ventricular diastolic filling with normal left atrial filling pressure.   3. There is normal right ventricular global systolic function.   4. Right ventricular systolic pressure is within normal limits.    ECG 12 Lead  Result Date: 3/20/2025  Normal sinus rhythm Possible Left atrial enlargement Septal infarct , age undetermined ST & T wave abnormality, consider inferolateral ischemia Abnormal ECG No previous ECGs available    Physical Exam  General: Patient is awake, alert, conversant. Not acutely distressed.   HEENT: Pupils are equal and round, no scleral icterus or conjunctivitis  Chest: Breathing appears comfortable on room air. Chest movement symmetric. Normal respiratory effort. No adventitious lung sounds on auscultation.   Cardiac: Normal rate, regular rhythm. Normal S1, S2. No murmur appreciated. Radial pulses 2+, pedal pulses 2+.   Volume: Mucous membranes moist. No lower extremity edema.   Abdomen: Bowel sounds are active. Abdomen is soft and non tender to palpation.   EXT: Upper and lower extremities are atraumatic in appearance without tenderness or deformity. No swelling or erythema.   MSK: No joint swelling appreciated. Grossly full active ROM  Skin: No lesions or rashes noted to exposed skin  Neuro: The patient is awake, alert and oriented to person, place, and time. Motor function is normal with muscle strength 5/5 bilaterally to upper and lower extremities. Sensation to light touch is intact bilaterally. No gait abnormalities noted.  Psych: Appropriate mood and affect. Appropriate judgement and insight.      Assessment and Plan    Ms Macias is a 64 F with a PMHx HFrEF (EF 45% -> 50-55%), CAD, DM2, Hep C s/p treatment, CKD, HLD who presents as direct admission for CABG evaluation.     24-hour updates:  -Cardiac surgery consult - needs repeat [facility] prior  to CABG eval    #Triple vessel CAD   #CABG evaluation   - [facility] in 10/23: 3 vessel disease (mid LAD 85% + 90%; Diag#1 75%, midCirc 65%, midRCA 80%)   -TTE 07/23: EF 45%  -Repeat TTE 3/20: EF 50-55%  -Lipid panel: Total cholesterol: 165, LDL 85, HDL 43.7, Triglycerides 183  -CABG previously recommended, patient declined.   -Evaluated by outaptient cardiology, directly admitted for CABG evaluation. Cardiac surgery aware of patient, will review case and make recommendations.     Plan:  -Cardiac surgery consulted, will re-engage following [facility]  -Atorvastatin 40 mg PO daily  -Aspirin 81 mg PO daily  -Carvedilol 12.5 mg PO BID    #HFmrEF  #Ischemic Cardiomyopathy  -TTE 07/23: EF 45%  -Repeat TTE 3/20: EF 50-55%    Plan:  - Holding home valsartan and Jardiance iso CABG  - Continue home coreg 12.5 mg BID   - Continue Imdur 30 mg PO Daily  - Pending repeat TTE     #DM T2  HgA1c: 6.5% 3/19    Plan:  -SSI while inpatient     #CKD Stage 3b  -Unclear etiology  -Baseline on chart review: 1.5-2  -eGFR this admission: 30    Plan:  -Continue to trend RFP  -Avoid nephrotoxic agents as possible  -Strict I/O's    #Hepatitis C - cured  #Preivous Liver Cirrhosis  -S/p Mavyret in 2018 with sustained virologic response.   -Previous concern for stage 2 fibrosis on fibroscan. Repeat ordered by GI ([facility]) in 2024, results unclear.   -Hepatic panel WNL on admission    Plan:  -Continue to monitor    Disposition  -Barriers: Pending possible CABG  -Destination: Home  -Follow-up: Cardiology, PCP, likely Cardiac Surgery    Diet: Cardiac  Electrolytes: K >4; Mg >2  DVT ppx: Heparin SQ  GI ppx: Not indicated   Lines/tubes: PIV  O2: RA  Drips: None  Baseline Cr: 1.5-2  Code status: Full  Surrogate decision maker: Mary (daughter) [phone]       Carol Meyer, PGY1  Internal Medicine

## 2025-03-20 NOTE — CARE PLAN
The patient's goals for the shift include      The clinical goals for the shift include Pt will remain safe and free from falls this shift      Problem: Pain - Adult  Goal: Verbalizes/displays adequate comfort level or baseline comfort level  Outcome: Progressing     Problem: Safety - Adult  Goal: Free from fall injury  Outcome: Progressing     Problem: Discharge Planning  Goal: Discharge to home or other facility with appropriate resources  Outcome: Progressing     Problem: Chronic Conditions and Co-morbidities  Goal: Patient's chronic conditions and co-morbidity symptoms are monitored and maintained or improved  Outcome: Progressing     Problem: Nutrition  Goal: Nutrient intake appropriate for maintaining nutritional needs  Outcome: Progressing     Problem: Diabetes  Goal: Achieve decreasing blood glucose levels by end of shift  Outcome: Progressing  Goal: Increase stability of blood glucose readings by end of shift  Outcome: Progressing  Goal: Decrease in ketones present in urine by end of shift  Outcome: Progressing  Goal: Maintain electrolyte levels within acceptable range throughout shift  Outcome: Progressing  Goal: Maintain glucose levels >70mg/dl to <250mg/dl throughout shift  Outcome: Progressing  Goal: No changes in neurological exam by end of shift  Outcome: Progressing  Goal: Learn about and adhere to nutrition recommendations by end of shift  Outcome: Progressing  Goal: Vital signs within normal range for age by end of shift  Outcome: Progressing  Goal: Increase self care and/or family involovement by end of shift  Outcome: Progressing  Goal: Receive DSME education by end of shift  Outcome: Progressing     Problem: Pain  Goal: Takes deep breaths with improved pain control throughout the shift  Outcome: Progressing  Goal: Turns in bed with improved pain control throughout the shift  Outcome: Progressing  Goal: Walks with improved pain control throughout the shift  Outcome: Progressing  Goal: Performs  ADL's with improved pain control throughout shift  Outcome: Progressing  Goal: Participates in PT with improved pain control throughout the shift  Outcome: Progressing  Goal: Free from opioid side effects throughout the shift  Outcome: Progressing  Goal: Free from acute confusion related to pain meds throughout the shift  Outcome: Progressing

## 2025-03-20 NOTE — CONSULTS
Reason for Consult: CAD  CARDIAC SURGERY CONSULT NOTE    HISTORY OF PRESENT ILLNESS  Ms. Cornelia Macias is a 64 y.o. female history notable for CHF, HLD, DM2, CAD, depression, Hepatitis C tx Mavyret 2018 with SVR (hepatitis C is cured, with last RNA in 2021 undetectable), who presented at as a direct admission for a CABG evaluation.     The patient was found to have coronary artery disease back in 10/23 (mid LAD 85% + 90%; Diag#1 75%, midCirc 65%, midRCA 80%) planned for CABG then, but patient opted for medical management. Now, the patient states she has been more fatigud than normal. Denies chest pain, SOB, LH, dizziness, palpitations, syncope, n/v, and abdominal pain. Follows Dr. Amador, cardiologist at .     Cardiac/Pertinent Imaging  CONCLUSIONS: TTE 3/20/25   1. Left ventricular ejection fraction is low normal, by visual estimate at 50-55%.   2. Spectral Doppler shows a Grade I (impaired relaxation pattern) of left ventricular diastolic filling with normal left atrial filling pressure.   3. There is normal right ventricular global systolic function.   4. Right ventricular systolic pressure is within normal limits.    Subjective   No past medical history on file.  No past surgical history on file.  Social History     Tobacco Use    Smoking status: Every Day     Types: Cigarettes    Smokeless tobacco: Never   Substance Use Topics    Alcohol use: Not Currently    Drug use: Never     No family history on file.    Lisinopril    Prior to Admission medications    Medication Sig Start Date End Date Taking? Authorizing Provider   aspirin 81 mg EC tablet Take 1 tablet (81 mg) by mouth once daily.    Historical Provider, MD   atorvastatin (Lipitor) 20 mg tablet Take 1 tablet (20 mg) by mouth once daily. 10/3/24 11/7/25  Margaret Valdes MD   carvedilol (Coreg) 12.5 mg tablet Take 1 tablet (12.5 mg) by mouth 2 times a day. 10/3/24 10/3/25  Margaret Valdes MD   cholecalciferol (Vitamin D3) 25 MCG (1000 UT) tablet Take  "1 tablet (1,000 Units) by mouth once daily. 1/30/25 1/30/26  Margaret Valdes MD   isosorbide mononitrate ER (Imdur) 30 mg 24 hr tablet Take 1 tablet (30 mg) by mouth once daily. 1/24/25 7/23/25  Margaret Valdes MD   Jardiance 10 mg Take 1 tablet (10 mg) by mouth once daily. 8/26/24 3/18/25  Juliana Noel MD   magnesium glycinate 100 mg magnesium capsule Take 1 capsule (100 mg) by mouth once daily.    Historical Provider, MD   valsartan (Diovan) 80 mg tablet Take 1 tablet (80 mg) by mouth once daily. 1/21/25 7/20/25  Margaret Valdes MD       Review of Systems  Review of Systems   Constitutional: Negative.    HENT: Negative.     Eyes: Negative.    Respiratory: Negative.     Cardiovascular: Negative.    Gastrointestinal: Negative.    Endocrine: Negative.    Genitourinary: Negative.    Musculoskeletal: Negative.    Neurological: Negative.            Objective   /79   Pulse 84   Temp 36 °C (96.8 °F)   Resp 18   Ht 1.575 m (5' 2\")   Wt 58.2 kg (128 lb 4.9 oz)   SpO2 96%   BMI 23.47 kg/m²   0-10 (Numeric) Pain Score: 4   Vitals:    03/20/25 0300   Weight: 58.2 kg (128 lb 4.9 oz)          Intake/Output Summary (Last 24 hours) at 3/20/2025 1537  Last data filed at 3/20/2025 0603  Gross per 24 hour   Intake 360 ml   Output 450 ml   Net -90 ml       Physical Exam  Physical Exam  Constitutional:       General: She is not in acute distress.     Appearance: She is not ill-appearing or toxic-appearing.   HENT:      Head: Normocephalic.      Mouth/Throat:      Mouth: Mucous membranes are moist.   Cardiovascular:      Rate and Rhythm: Normal rate and regular rhythm.      Heart sounds: No murmur heard.  Pulmonary:      Effort: Pulmonary effort is normal.   Musculoskeletal:         General: Normal range of motion.      Cervical back: Neck supple.      Right lower leg: No edema.      Left lower leg: No edema.   Skin:     General: Skin is warm and dry.   Neurological:      General: No focal deficit present.      " Mental Status: She is alert and oriented to person, place, and time.   Psychiatric:         Mood and Affect: Mood normal.         Behavior: Behavior normal.         Medications  Scheduled medications  aspirin, 81 mg, oral, Daily  atorvastatin, 40 mg, oral, Nightly  carvedilol, 12.5 mg, oral, BID  cholecalciferol, 1,000 Units, oral, Daily  [Held by provider] empagliflozin, 10 mg, oral, Daily  heparin (porcine), 5,000 Units, subcutaneous, q8h  insulin lispro, 0-5 Units, subcutaneous, TID AC  isosorbide mononitrate ER, 30 mg, oral, Daily  perflutren lipid microspheres, 0.5-10 mL of dilution, intravenous, Once in imaging  polyethylene glycol, 17 g, oral, Daily  [Held by provider] valsartan, 80 mg, oral, Daily    Continuous medications   PRN medications  PRN medications: dextrose, dextrose, glucagon, glucagon    Labs  Results for orders placed or performed during the hospital encounter of 03/19/25 (from the past 24 hours)   Basic metabolic panel   Result Value Ref Range    Glucose 165 (H) 74 - 99 mg/dL    Sodium 143 136 - 145 mmol/L    Potassium 3.9 3.5 - 5.3 mmol/L    Chloride 108 (H) 98 - 107 mmol/L    Bicarbonate 26 21 - 32 mmol/L    Anion Gap 13 10 - 20 mmol/L    Urea Nitrogen 26 (H) 6 - 23 mg/dL    Creatinine 2.07 (H) 0.50 - 1.05 mg/dL    eGFR 26 (L) >60 mL/min/1.73m*2    Calcium 9.8 8.6 - 10.6 mg/dL   CBC   Result Value Ref Range    WBC 5.7 4.4 - 11.3 x10*3/uL    nRBC 0.0 0.0 - 0.0 /100 WBCs    RBC 3.34 (L) 4.00 - 5.20 x10*6/uL    Hemoglobin 10.1 (L) 12.0 - 16.0 g/dL    Hematocrit 31.1 (L) 36.0 - 46.0 %    MCV 93 80 - 100 fL    MCH 30.2 26.0 - 34.0 pg    MCHC 32.5 32.0 - 36.0 g/dL    RDW 13.2 11.5 - 14.5 %    Platelets 173 150 - 450 x10*3/uL   Hepatic Function Panel   Result Value Ref Range    Albumin 4.0 3.4 - 5.0 g/dL    Bilirubin, Total 0.4 0.0 - 1.2 mg/dL    Bilirubin, Direct 0.1 0.0 - 0.3 mg/dL    Alkaline Phosphatase 45 33 - 136 U/L    ALT 7 7 - 45 U/L    AST 16 9 - 39 U/L    Total Protein 7.1 6.4 - 8.2 g/dL    Type And Screen   Result Value Ref Range    ABO TYPE A     Rh TYPE POS     ANTIBODY SCREEN NEG    Hemoglobin A1C   Result Value Ref Range    Hemoglobin A1C 6.5 (H) See comment %    Estimated Average Glucose 140 Not Established mg/dL   Coagulation Screen   Result Value Ref Range    Protime 11.2 9.8 - 12.4 seconds    INR 1.0 0.9 - 1.1    aPTT 26 26 - 36 seconds   Lipid panel   Result Value Ref Range    Cholesterol 165 0 - 199 mg/dL    HDL-Cholesterol 43.7 mg/dL    Cholesterol/HDL Ratio 3.8     LDL Calculated 85 <=99 mg/dL    VLDL 37 0 - 40 mg/dL    Triglycerides 183 (H) 0 - 149 mg/dL    Non HDL Cholesterol 121 0 - 149 mg/dL   TSH   Result Value Ref Range    Thyroid Stimulating Hormone 2.59 0.44 - 3.98 mIU/L   Creatine Kinase   Result Value Ref Range    Creatine Kinase 102 0 - 215 U/L   B-type natriuretic peptide   Result Value Ref Range    BNP 41 0 - 99 pg/mL   POCT GLUCOSE   Result Value Ref Range    POCT Glucose 180 (H) 74 - 99 mg/dL   Urinalysis with Reflex Culture and Microscopic   Result Value Ref Range    Color, Urine Light-Yellow Light-Yellow, Yellow, Dark-Yellow    Appearance, Urine Clear Clear    Specific Gravity, Urine 1.023 1.005 - 1.035    pH, Urine 6.0 5.0, 5.5, 6.0, 6.5, 7.0, 7.5, 8.0    Protein, Urine 100 (2+) (A) NEGATIVE, 10 (TRACE), 20 (TRACE) mg/dL    Glucose, Urine OVER (4+) (A) Normal mg/dL    Blood, Urine NEGATIVE NEGATIVE mg/dL    Ketones, Urine NEGATIVE NEGATIVE mg/dL    Bilirubin, Urine NEGATIVE NEGATIVE mg/dL    Urobilinogen, Urine Normal Normal mg/dL    Nitrite, Urine NEGATIVE NEGATIVE    Leukocyte Esterase, Urine NEGATIVE NEGATIVE   Extra Urine Gray Tube   Result Value Ref Range    Extra Tube Hold for add-ons.    Urinalysis Microscopic   Result Value Ref Range    WBC, Urine 1-5 1-5, NONE /HPF    RBC, Urine 1-2 NONE, 1-2, 3-5 /HPF   POCT GLUCOSE   Result Value Ref Range    POCT Glucose 111 (H) 74 - 99 mg/dL   CBC and Auto Differential   Result Value Ref Range    WBC 5.0 4.4 - 11.3 x10*3/uL     nRBC 0.0 0.0 - 0.0 /100 WBCs    RBC 3.42 (L) 4.00 - 5.20 x10*6/uL    Hemoglobin 10.2 (L) 12.0 - 16.0 g/dL    Hematocrit 32.6 (L) 36.0 - 46.0 %    MCV 95 80 - 100 fL    MCH 29.8 26.0 - 34.0 pg    MCHC 31.3 (L) 32.0 - 36.0 g/dL    RDW 13.7 11.5 - 14.5 %    Platelets 175 150 - 450 x10*3/uL    Neutrophils % 31.3 40.0 - 80.0 %    Immature Granulocytes %, Automated 0.2 0.0 - 0.9 %    Lymphocytes % 55.7 13.0 - 44.0 %    Monocytes % 11.0 2.0 - 10.0 %    Eosinophils % 1.6 0.0 - 6.0 %    Basophils % 0.2 0.0 - 2.0 %    Neutrophils Absolute 1.57 1.20 - 7.70 x10*3/uL    Immature Granulocytes Absolute, Automated 0.01 0.00 - 0.70 x10*3/uL    Lymphocytes Absolute 2.79 1.20 - 4.80 x10*3/uL    Monocytes Absolute 0.55 0.10 - 1.00 x10*3/uL    Eosinophils Absolute 0.08 0.00 - 0.70 x10*3/uL    Basophils Absolute 0.01 0.00 - 0.10 x10*3/uL   Magnesium   Result Value Ref Range    Magnesium 2.56 (H) 1.60 - 2.40 mg/dL   Renal Function Panel   Result Value Ref Range    Glucose 78 74 - 99 mg/dL    Sodium 138 136 - 145 mmol/L    Potassium 4.0 3.5 - 5.3 mmol/L    Chloride 105 98 - 107 mmol/L    Bicarbonate 25 21 - 32 mmol/L    Anion Gap 12 10 - 20 mmol/L    Urea Nitrogen 23 6 - 23 mg/dL    Creatinine 1.86 (H) 0.50 - 1.05 mg/dL    eGFR 30 (L) >60 mL/min/1.73m*2    Calcium 9.7 8.6 - 10.6 mg/dL    Phosphorus 3.2 2.5 - 4.9 mg/dL    Albumin 3.9 3.4 - 5.0 g/dL   Transthoracic Echo (TTE) Complete   Result Value Ref Range    AV pk nam 1.01 m/s    LVOT diam 1.80 cm    MV E/A ratio 0.82     LA vol index A/L 22.1 ml/m2    Tricuspid annular plane systolic excursion 1.9 cm    LV EF 53 %    RV free wall pk S' 9.57 cm/s    LVIDd 4.20 cm    RVSP 25.3 mmHg    Aortic Valve Area by Continuity of Peak Velocity 1.92 cm2    AV pk grad 4 mmHg    LV A4C EF 47.8    POCT GLUCOSE   Result Value Ref Range    POCT Glucose 97 74 - 99 mg/dL               ASSESSMENT & PLAN  Ms. Cornelia Macias is a 64 y.o. female history notable for CHF, HLD, DM2, CAD, depression, Hepatitis C  tx Mavyret 2018 with SVR (hepatitis C is cured, with last RNA in 2021 undetectable), who presented at as a direct admission for a CABG evaluation.     The patient was found to have coronary artery disease back in 10/23 (mid LAD 85% + 90%; Diag#1 75%, midCirc 65%, midRCA 80%) planned for CABG then, but patient opted for medical management. Now, the patient states she has been more fatigud than normal. Denies chest pain, SOB, LH, dizziness, palpitations, syncope, n/v, and abdominal pain. Follows Dr. Amador, cardiologist at .     RECOMMENDATIONS/PLAN  Dr. Elias and Dr. Rosa Elena Mahoney aware of patient.  - Please obtain a LHC  - Medical optimization per primary team  - TTE EF 50-55%   - Further recs pending updated LHC      Will continue to follow along.  Thank you for the consultation.   Patient educated and all questions answered.  Please page the cardiac surgery consult pager [phone] with any questions or changes in patient condition.    Nette Khan PA-C  Cardiac Surgery Consult [facility]  [facility]  Cardiac Surgery Consult Pager [phone]     3/20/2025  3:37 PM

## 2025-03-20 NOTE — H&P (VIEW-ONLY)
Reason for Consult: CAD  CARDIAC SURGERY CONSULT NOTE    HISTORY OF PRESENT ILLNESS  Ms. Cornelia Macias is a 64 y.o. female history notable for CHF, HLD, DM2, CAD, depression, Hepatitis C tx Mavyret 2018 with SVR (hepatitis C is cured, with last RNA in 2021 undetectable), who presented at as a direct admission for a CABG evaluation.     The patient was found to have coronary artery disease back in 10/23 (mid LAD 85% + 90%; Diag#1 75%, midCirc 65%, midRCA 80%) planned for CABG then, but patient opted for medical management. Now, the patient states she has been more fatigud than normal. Denies chest pain, SOB, LH, dizziness, palpitations, syncope, n/v, and abdominal pain. Follows Dr. Amador, cardiologist at .     Cardiac/Pertinent Imaging  CONCLUSIONS: TTE 3/20/25   1. Left ventricular ejection fraction is low normal, by visual estimate at 50-55%.   2. Spectral Doppler shows a Grade I (impaired relaxation pattern) of left ventricular diastolic filling with normal left atrial filling pressure.   3. There is normal right ventricular global systolic function.   4. Right ventricular systolic pressure is within normal limits.    Subjective   No past medical history on file.  No past surgical history on file.  Social History     Tobacco Use    Smoking status: Every Day     Types: Cigarettes    Smokeless tobacco: Never   Substance Use Topics    Alcohol use: Not Currently    Drug use: Never     No family history on file.    Lisinopril    Prior to Admission medications    Medication Sig Start Date End Date Taking? Authorizing Provider   aspirin 81 mg EC tablet Take 1 tablet (81 mg) by mouth once daily.    Historical Provider, MD   atorvastatin (Lipitor) 20 mg tablet Take 1 tablet (20 mg) by mouth once daily. 10/3/24 11/7/25  Margaret Valdes MD   carvedilol (Coreg) 12.5 mg tablet Take 1 tablet (12.5 mg) by mouth 2 times a day. 10/3/24 10/3/25  Margaret Valdes MD   cholecalciferol (Vitamin D3) 25 MCG (1000 UT) tablet Take  "1 tablet (1,000 Units) by mouth once daily. 1/30/25 1/30/26  Margaret Valdes MD   isosorbide mononitrate ER (Imdur) 30 mg 24 hr tablet Take 1 tablet (30 mg) by mouth once daily. 1/24/25 7/23/25  Margaret Valdes MD   Jardiance 10 mg Take 1 tablet (10 mg) by mouth once daily. 8/26/24 3/18/25  Juliana Noel MD   magnesium glycinate 100 mg magnesium capsule Take 1 capsule (100 mg) by mouth once daily.    Historical Provider, MD   valsartan (Diovan) 80 mg tablet Take 1 tablet (80 mg) by mouth once daily. 1/21/25 7/20/25  Margaret Valdes MD       Review of Systems  Review of Systems   Constitutional: Negative.    HENT: Negative.     Eyes: Negative.    Respiratory: Negative.     Cardiovascular: Negative.    Gastrointestinal: Negative.    Endocrine: Negative.    Genitourinary: Negative.    Musculoskeletal: Negative.    Neurological: Negative.            Objective   /79   Pulse 84   Temp 36 °C (96.8 °F)   Resp 18   Ht 1.575 m (5' 2\")   Wt 58.2 kg (128 lb 4.9 oz)   SpO2 96%   BMI 23.47 kg/m²   0-10 (Numeric) Pain Score: 4   Vitals:    03/20/25 0300   Weight: 58.2 kg (128 lb 4.9 oz)          Intake/Output Summary (Last 24 hours) at 3/20/2025 1537  Last data filed at 3/20/2025 0603  Gross per 24 hour   Intake 360 ml   Output 450 ml   Net -90 ml       Physical Exam  Physical Exam  Constitutional:       General: She is not in acute distress.     Appearance: She is not ill-appearing or toxic-appearing.   HENT:      Head: Normocephalic.      Mouth/Throat:      Mouth: Mucous membranes are moist.   Cardiovascular:      Rate and Rhythm: Normal rate and regular rhythm.      Heart sounds: No murmur heard.  Pulmonary:      Effort: Pulmonary effort is normal.   Musculoskeletal:         General: Normal range of motion.      Cervical back: Neck supple.      Right lower leg: No edema.      Left lower leg: No edema.   Skin:     General: Skin is warm and dry.   Neurological:      General: No focal deficit present.      " Mental Status: She is alert and oriented to person, place, and time.   Psychiatric:         Mood and Affect: Mood normal.         Behavior: Behavior normal.         Medications  Scheduled medications  aspirin, 81 mg, oral, Daily  atorvastatin, 40 mg, oral, Nightly  carvedilol, 12.5 mg, oral, BID  cholecalciferol, 1,000 Units, oral, Daily  [Held by provider] empagliflozin, 10 mg, oral, Daily  heparin (porcine), 5,000 Units, subcutaneous, q8h  insulin lispro, 0-5 Units, subcutaneous, TID AC  isosorbide mononitrate ER, 30 mg, oral, Daily  perflutren lipid microspheres, 0.5-10 mL of dilution, intravenous, Once in imaging  polyethylene glycol, 17 g, oral, Daily  [Held by provider] valsartan, 80 mg, oral, Daily    Continuous medications   PRN medications  PRN medications: dextrose, dextrose, glucagon, glucagon    Labs  Results for orders placed or performed during the hospital encounter of 03/19/25 (from the past 24 hours)   Basic metabolic panel   Result Value Ref Range    Glucose 165 (H) 74 - 99 mg/dL    Sodium 143 136 - 145 mmol/L    Potassium 3.9 3.5 - 5.3 mmol/L    Chloride 108 (H) 98 - 107 mmol/L    Bicarbonate 26 21 - 32 mmol/L    Anion Gap 13 10 - 20 mmol/L    Urea Nitrogen 26 (H) 6 - 23 mg/dL    Creatinine 2.07 (H) 0.50 - 1.05 mg/dL    eGFR 26 (L) >60 mL/min/1.73m*2    Calcium 9.8 8.6 - 10.6 mg/dL   CBC   Result Value Ref Range    WBC 5.7 4.4 - 11.3 x10*3/uL    nRBC 0.0 0.0 - 0.0 /100 WBCs    RBC 3.34 (L) 4.00 - 5.20 x10*6/uL    Hemoglobin 10.1 (L) 12.0 - 16.0 g/dL    Hematocrit 31.1 (L) 36.0 - 46.0 %    MCV 93 80 - 100 fL    MCH 30.2 26.0 - 34.0 pg    MCHC 32.5 32.0 - 36.0 g/dL    RDW 13.2 11.5 - 14.5 %    Platelets 173 150 - 450 x10*3/uL   Hepatic Function Panel   Result Value Ref Range    Albumin 4.0 3.4 - 5.0 g/dL    Bilirubin, Total 0.4 0.0 - 1.2 mg/dL    Bilirubin, Direct 0.1 0.0 - 0.3 mg/dL    Alkaline Phosphatase 45 33 - 136 U/L    ALT 7 7 - 45 U/L    AST 16 9 - 39 U/L    Total Protein 7.1 6.4 - 8.2 g/dL    Type And Screen   Result Value Ref Range    ABO TYPE A     Rh TYPE POS     ANTIBODY SCREEN NEG    Hemoglobin A1C   Result Value Ref Range    Hemoglobin A1C 6.5 (H) See comment %    Estimated Average Glucose 140 Not Established mg/dL   Coagulation Screen   Result Value Ref Range    Protime 11.2 9.8 - 12.4 seconds    INR 1.0 0.9 - 1.1    aPTT 26 26 - 36 seconds   Lipid panel   Result Value Ref Range    Cholesterol 165 0 - 199 mg/dL    HDL-Cholesterol 43.7 mg/dL    Cholesterol/HDL Ratio 3.8     LDL Calculated 85 <=99 mg/dL    VLDL 37 0 - 40 mg/dL    Triglycerides 183 (H) 0 - 149 mg/dL    Non HDL Cholesterol 121 0 - 149 mg/dL   TSH   Result Value Ref Range    Thyroid Stimulating Hormone 2.59 0.44 - 3.98 mIU/L   Creatine Kinase   Result Value Ref Range    Creatine Kinase 102 0 - 215 U/L   B-type natriuretic peptide   Result Value Ref Range    BNP 41 0 - 99 pg/mL   POCT GLUCOSE   Result Value Ref Range    POCT Glucose 180 (H) 74 - 99 mg/dL   Urinalysis with Reflex Culture and Microscopic   Result Value Ref Range    Color, Urine Light-Yellow Light-Yellow, Yellow, Dark-Yellow    Appearance, Urine Clear Clear    Specific Gravity, Urine 1.023 1.005 - 1.035    pH, Urine 6.0 5.0, 5.5, 6.0, 6.5, 7.0, 7.5, 8.0    Protein, Urine 100 (2+) (A) NEGATIVE, 10 (TRACE), 20 (TRACE) mg/dL    Glucose, Urine OVER (4+) (A) Normal mg/dL    Blood, Urine NEGATIVE NEGATIVE mg/dL    Ketones, Urine NEGATIVE NEGATIVE mg/dL    Bilirubin, Urine NEGATIVE NEGATIVE mg/dL    Urobilinogen, Urine Normal Normal mg/dL    Nitrite, Urine NEGATIVE NEGATIVE    Leukocyte Esterase, Urine NEGATIVE NEGATIVE   Extra Urine Gray Tube   Result Value Ref Range    Extra Tube Hold for add-ons.    Urinalysis Microscopic   Result Value Ref Range    WBC, Urine 1-5 1-5, NONE /HPF    RBC, Urine 1-2 NONE, 1-2, 3-5 /HPF   POCT GLUCOSE   Result Value Ref Range    POCT Glucose 111 (H) 74 - 99 mg/dL   CBC and Auto Differential   Result Value Ref Range    WBC 5.0 4.4 - 11.3 x10*3/uL     nRBC 0.0 0.0 - 0.0 /100 WBCs    RBC 3.42 (L) 4.00 - 5.20 x10*6/uL    Hemoglobin 10.2 (L) 12.0 - 16.0 g/dL    Hematocrit 32.6 (L) 36.0 - 46.0 %    MCV 95 80 - 100 fL    MCH 29.8 26.0 - 34.0 pg    MCHC 31.3 (L) 32.0 - 36.0 g/dL    RDW 13.7 11.5 - 14.5 %    Platelets 175 150 - 450 x10*3/uL    Neutrophils % 31.3 40.0 - 80.0 %    Immature Granulocytes %, Automated 0.2 0.0 - 0.9 %    Lymphocytes % 55.7 13.0 - 44.0 %    Monocytes % 11.0 2.0 - 10.0 %    Eosinophils % 1.6 0.0 - 6.0 %    Basophils % 0.2 0.0 - 2.0 %    Neutrophils Absolute 1.57 1.20 - 7.70 x10*3/uL    Immature Granulocytes Absolute, Automated 0.01 0.00 - 0.70 x10*3/uL    Lymphocytes Absolute 2.79 1.20 - 4.80 x10*3/uL    Monocytes Absolute 0.55 0.10 - 1.00 x10*3/uL    Eosinophils Absolute 0.08 0.00 - 0.70 x10*3/uL    Basophils Absolute 0.01 0.00 - 0.10 x10*3/uL   Magnesium   Result Value Ref Range    Magnesium 2.56 (H) 1.60 - 2.40 mg/dL   Renal Function Panel   Result Value Ref Range    Glucose 78 74 - 99 mg/dL    Sodium 138 136 - 145 mmol/L    Potassium 4.0 3.5 - 5.3 mmol/L    Chloride 105 98 - 107 mmol/L    Bicarbonate 25 21 - 32 mmol/L    Anion Gap 12 10 - 20 mmol/L    Urea Nitrogen 23 6 - 23 mg/dL    Creatinine 1.86 (H) 0.50 - 1.05 mg/dL    eGFR 30 (L) >60 mL/min/1.73m*2    Calcium 9.7 8.6 - 10.6 mg/dL    Phosphorus 3.2 2.5 - 4.9 mg/dL    Albumin 3.9 3.4 - 5.0 g/dL   Transthoracic Echo (TTE) Complete   Result Value Ref Range    AV pk nam 1.01 m/s    LVOT diam 1.80 cm    MV E/A ratio 0.82     LA vol index A/L 22.1 ml/m2    Tricuspid annular plane systolic excursion 1.9 cm    LV EF 53 %    RV free wall pk S' 9.57 cm/s    LVIDd 4.20 cm    RVSP 25.3 mmHg    Aortic Valve Area by Continuity of Peak Velocity 1.92 cm2    AV pk grad 4 mmHg    LV A4C EF 47.8    POCT GLUCOSE   Result Value Ref Range    POCT Glucose 97 74 - 99 mg/dL               ASSESSMENT & PLAN  Ms. Cornelia Macias is a 64 y.o. female history notable for CHF, HLD, DM2, CAD, depression, Hepatitis C  tx Mavyret 2018 with SVR (hepatitis C is cured, with last RNA in 2021 undetectable), who presented at as a direct admission for a CABG evaluation.     The patient was found to have coronary artery disease back in 10/23 (mid LAD 85% + 90%; Diag#1 75%, midCirc 65%, midRCA 80%) planned for CABG then, but patient opted for medical management. Now, the patient states she has been more fatigud than normal. Denies chest pain, SOB, LH, dizziness, palpitations, syncope, n/v, and abdominal pain. Follows Dr. Amador, cardiologist at .     RECOMMENDATIONS/PLAN  Dr. Elias and Dr. Rosa Elena Mahoney aware of patient.  - Please obtain a LHC  - Medical optimization per primary team  - TTE EF 50-55%   - Further recs pending updated LHC      Will continue to follow along.  Thank you for the consultation.   Patient educated and all questions answered.  Please page the cardiac surgery consult pager [phone] with any questions or changes in patient condition.    Nette Khan PA-C  Cardiac Surgery Consult [facility]  [facility]  Cardiac Surgery Consult Pager [phone]     3/20/2025  3:37 PM

## 2025-03-20 NOTE — PROGRESS NOTES
Pharmacy Medication History Review    Cornelia Macias is a 64 y.o. female admitted for CAD, multiple vessel. Pharmacy reviewed the patient's oxgeg-[id number] medications and allergies for accuracy.    The list below reflects the updated PTA list.   Prior to Admission Medications   Prescriptions Last Dose Informant   Jardiance 10 mg     Sig: Take 1 tablet (10 mg) by mouth once daily.   aspirin 81 mg EC tablet  Self   Sig: Take 1 tablet (81 mg) by mouth once daily.   atorvastatin (Lipitor) 20 mg tablet  Self   Sig: Take 1 tablet (20 mg) by mouth once daily.   carvedilol (Coreg) 12.5 mg tablet  Self   Sig: Take 1 tablet (12.5 mg) by mouth 2 times a day.   cholecalciferol (Vitamin D3) 25 MCG (1000 UT) tablet  Self   Sig: Take 1 tablet (1,000 Units) by mouth once daily.   isosorbide mononitrate ER (Imdur) 30 mg 24 hr tablet  Self   Sig: Take 1 tablet (30 mg) by mouth once daily.   magnesium glycinate 100 mg magnesium capsule  Self   Sig: Take 1 capsule (100 mg) by mouth once daily.   valsartan (Diovan) 80 mg tablet  Self   Sig: Take 1 tablet (80 mg) by mouth once daily.      Facility-Administered Medications: None        The list below reflects the updated allergy list. Please review each documented allergy for additional clarification and justification.  Allergies  Reviewed by Preet Parker PharmD on 3/20/2025        Severity Reactions Comments    Lisinopril Not Specified Cough             Patient accepts M2B at discharge.     Sources used to complete the med history include:    [facility]  Pharmacy dispense history  Patient Interview Good historian  Chart Review  Care Everywhere     Below are additional concerns with the patient's PTA list.  Patient is a good historian, she recalls most medications taking from memory. When prompted with drug names/ indication for remainder pt is able to identify if taking or not.      Medications ADDED:  Magnesium Glycinate  Medications CHANGED:  none  Medications REMOVED:    none    Preet Parker PharmD  Transitions of Care Pharmacist   Meds Ambulatory and Retail Services  Please reach out via Secure Chat for questions, or if no response call [phone] or vocera [facility]

## 2025-03-20 NOTE — PROGRESS NOTES
TCC met with patient at bedside for discharge assessment. Patient lives with daughter; uses [employer] and friends for transport to medical appointments. Pharmacy- [facility]; DME- walker, raised toilet seat. Does patient feel safe- Yes. Current discharge recommendations- TBD. TCC will continue to follow for discharge planning.     03/20/25 0257   Discharge Planning   Living Arrangements Children  (Lives w/Daughter)   Support Systems Children;[employer]/lydia community   Assistance Needed TBD   Type of Residence Private residence   Number of Stairs to Enter Residence 4   Do you have animals or pets at home? No   Who is requesting discharge planning? Provider   Home or Post Acute Services In home services;None   Expected Discharge Disposition Home   Does the patient need discharge transport arranged? Yes   RoundTrip coordination needed? Yes   Has discharge transport been arranged? No   Financial Resource Strain   How hard is it for you to pay for the very basics like food, housing, medical care, and heating? Not hard   Housing Stability   In the last 12 months, was there a time when you were not able to pay the mortgage or rent on time? N   At any time in the past 12 months, were you homeless or living in a shelter (including now)? N   Transportation Needs   In the past 12 months, has lack of transportation kept you from medical appointments or from getting medications? no   In the past 12 months, has lack of transportation kept you from meetings, work, or from getting things needed for daily living? No   Stroke Family Assessment   Stroke Family Assessment Needed No   Intensity of Service   Intensity of Service 0-30 min       BRADEN Monte, RN  [facility]3], [place] [address]  Transitional Care Coordinator, Mon-Fri  Cell: [phone], Office: [phone]  Email: [email or web address]

## 2025-03-20 NOTE — SIGNIFICANT EVENT
SENIOR STAFFING NOTE:     Patient is a 64 F with a PMHx HFmEF (E F45%), CAD, DM2, Hep C, CKD, HLD who presents as direct admission for CABG evaluation.     Patient overall reports doing well at home. She denies any chest pain, SOB, lightheadedness, dizziness, palpitations, syncope, N/V, abdominal pain. She states she is able to golf and climb stairs without any chest pain or SOB. She states she has never had chest pain or SOB before, even when she underwent cardiac evaluation in 2023. She reports good compliance with her medications. Patient previously follows with cardiology at [facility], establishing with Dr. Amador here currently. Her last LHC was in 10/23 which showed 3 vessel disease (mid LAD 85% + 90%; Diag#1 75%, midCirc 65%, midRCA 80%). Last TTE 07/23 which showed EF 45%. It was recommended that patient undergo CABG at that time, however patient defer and wanted to pursue medical management.     Physical Exam  Constitutional:       General: She is not in acute distress.     Appearance: Normal appearance.   HENT:      Head: Normocephalic and atraumatic.   Cardiovascular:      Rate and Rhythm: Normal rate and regular rhythm.      Pulses: Normal pulses.      Heart sounds: Normal heart sounds.   Pulmonary:      Effort: Pulmonary effort is normal.      Breath sounds: Normal breath sounds.   Abdominal:      General: Abdomen is flat.      Palpations: Abdomen is soft.   Musculoskeletal:      Right lower leg: No edema.      Left lower leg: No edema.   Skin:     General: Skin is warm and dry.   Neurological:      General: No focal deficit present.      Mental Status: She is alert and oriented to person, place, and time.   Psychiatric:         Mood and Affect: Mood normal.         Behavior: Behavior normal.       A/P:     Patient is a 64 F with a PMHx HFmEF (E F45%), CAD, DM2, Hep C, CKD, HLD who presents as direct admission for CABG evaluation. Overall patient is doing well without complaints of angina, SOB. Patient  euvolemic on exam. Last LHC in 2023 with significant 3 vessel disease, last TTE 2023 with EF 45%. Will order repeat TTE and pre-op labs. Pending CT surgery consult in AM.     #Triple vessel CAD   #CABG evaluation   - Pending TTE   - Continue home ASA 81,imdur 30 mg   - Increase atorva from 20 to 40 mg   - CT surgery consult in AM   - LFTs, HgbA1c, TSH/T4, Lipid panel, CBC, RFP, Coags, BNP pending     #HFmrEF  - Hold home valsartan and Jardiance iso CABG  - Continue home coreg 12.5 mg BID   - Pending repeat TTE     #DM2  - Last A1c 6.1% in 08/24  - Not currently on medications at home   - Pending repeat A1c    Please see intern note for further detail. Patient will be seen and formally staffed with attending in AM .    Full Code (confirmed on admission)   NOK: Mary (daughter) [phone]     Alexus Villatoro MD  PGY-2 Internal Medicine

## 2025-03-21 LAB
ALBUMIN SERPL BCP-MCNC: 4 G/DL (ref 3.4–5)
ANION GAP SERPL CALC-SCNC: 13 MMOL/L (ref 10–20)
BASOPHILS # BLD AUTO: 0.02 X10*3/UL (ref 0–0.1)
BASOPHILS NFR BLD AUTO: 0.4 %
BUN SERPL-MCNC: 33 MG/DL (ref 6–23)
CALCIUM SERPL-MCNC: 9.8 MG/DL (ref 8.6–10.6)
CHLORIDE SERPL-SCNC: 107 MMOL/L (ref 98–107)
CO2 SERPL-SCNC: 26 MMOL/L (ref 21–32)
CREAT SERPL-MCNC: 2.02 MG/DL (ref 0.5–1.05)
EGFRCR SERPLBLD CKD-EPI 2021: 27 ML/MIN/1.73M*2
EOSINOPHIL # BLD AUTO: 0.07 X10*3/UL (ref 0–0.7)
EOSINOPHIL NFR BLD AUTO: 1.3 %
ERYTHROCYTE [DISTWIDTH] IN BLOOD BY AUTOMATED COUNT: 13.7 % (ref 11.5–14.5)
GLUCOSE BLD MANUAL STRIP-MCNC: 118 MG/DL (ref 74–99)
GLUCOSE BLD MANUAL STRIP-MCNC: 78 MG/DL (ref 74–99)
GLUCOSE BLD MANUAL STRIP-MCNC: 95 MG/DL (ref 74–99)
GLUCOSE BLD MANUAL STRIP-MCNC: 98 MG/DL (ref 74–99)
GLUCOSE SERPL-MCNC: 96 MG/DL (ref 74–99)
HCT VFR BLD AUTO: 31.9 % (ref 36–46)
HGB BLD-MCNC: 10.1 G/DL (ref 12–16)
IMM GRANULOCYTES # BLD AUTO: 0.02 X10*3/UL (ref 0–0.7)
IMM GRANULOCYTES NFR BLD AUTO: 0.4 % (ref 0–0.9)
LYMPHOCYTES # BLD AUTO: 2.79 X10*3/UL (ref 1.2–4.8)
LYMPHOCYTES NFR BLD AUTO: 53.6 %
MAGNESIUM SERPL-MCNC: 2.57 MG/DL (ref 1.6–2.4)
MCH RBC QN AUTO: 30.1 PG (ref 26–34)
MCHC RBC AUTO-ENTMCNC: 31.7 G/DL (ref 32–36)
MCV RBC AUTO: 95 FL (ref 80–100)
MONOCYTES # BLD AUTO: 0.48 X10*3/UL (ref 0.1–1)
MONOCYTES NFR BLD AUTO: 9.2 %
NEUTROPHILS # BLD AUTO: 1.83 X10*3/UL (ref 1.2–7.7)
NEUTROPHILS NFR BLD AUTO: 35.1 %
NRBC BLD-RTO: 0 /100 WBCS (ref 0–0)
PHOSPHATE SERPL-MCNC: 3.6 MG/DL (ref 2.5–4.9)
PLATELET # BLD AUTO: 168 X10*3/UL (ref 150–450)
POTASSIUM SERPL-SCNC: 4.6 MMOL/L (ref 3.5–5.3)
RBC # BLD AUTO: 3.36 X10*6/UL (ref 4–5.2)
SODIUM SERPL-SCNC: 141 MMOL/L (ref 136–145)
WBC # BLD AUTO: 5.2 X10*3/UL (ref 4.4–11.3)

## 2025-03-21 PROCEDURE — 1200000002 HC GENERAL ROOM WITH TELEMETRY DAILY

## 2025-03-21 PROCEDURE — 2500000001 HC RX 250 WO HCPCS SELF ADMINISTERED DRUGS (ALT 637 FOR MEDICARE OP)

## 2025-03-21 PROCEDURE — B2151ZZ FLUOROSCOPY OF LEFT HEART USING LOW OSMOLAR CONTRAST: ICD-10-PCS | Performed by: INTERNAL MEDICINE

## 2025-03-21 PROCEDURE — 36415 COLL VENOUS BLD VENIPUNCTURE: CPT

## 2025-03-21 PROCEDURE — 2780000003 HC OR 278 NO HCPCS: Performed by: INTERNAL MEDICINE

## 2025-03-21 PROCEDURE — 4A023N7 MEASUREMENT OF CARDIAC SAMPLING AND PRESSURE, LEFT HEART, PERCUTANEOUS APPROACH: ICD-10-PCS | Performed by: INTERNAL MEDICINE

## 2025-03-21 PROCEDURE — 83735 ASSAY OF MAGNESIUM: CPT

## 2025-03-21 PROCEDURE — 2720000007 HC OR 272 NO HCPCS: Performed by: INTERNAL MEDICINE

## 2025-03-21 PROCEDURE — 2500000004 HC RX 250 GENERAL PHARMACY W/ HCPCS (ALT 636 FOR OP/ED)

## 2025-03-21 PROCEDURE — 93458 L HRT ARTERY/VENTRICLE ANGIO: CPT | Performed by: INTERNAL MEDICINE

## 2025-03-21 PROCEDURE — C1887 CATHETER, GUIDING: HCPCS | Performed by: INTERNAL MEDICINE

## 2025-03-21 PROCEDURE — 2550000001 HC RX 255 CONTRASTS: Performed by: INTERNAL MEDICINE

## 2025-03-21 PROCEDURE — C1894 INTRO/SHEATH, NON-LASER: HCPCS | Performed by: INTERNAL MEDICINE

## 2025-03-21 PROCEDURE — C1760 CLOSURE DEV, VASC: HCPCS | Performed by: INTERNAL MEDICINE

## 2025-03-21 PROCEDURE — 85025 COMPLETE CBC W/AUTO DIFF WBC: CPT

## 2025-03-21 PROCEDURE — 80069 RENAL FUNCTION PANEL: CPT

## 2025-03-21 PROCEDURE — 99232 SBSQ HOSP IP/OBS MODERATE 35: CPT

## 2025-03-21 PROCEDURE — B2111ZZ FLUOROSCOPY OF MULTIPLE CORONARY ARTERIES USING LOW OSMOLAR CONTRAST: ICD-10-PCS | Performed by: INTERNAL MEDICINE

## 2025-03-21 PROCEDURE — 82947 ASSAY GLUCOSE BLOOD QUANT: CPT

## 2025-03-21 PROCEDURE — 2500000004 HC RX 250 GENERAL PHARMACY W/ HCPCS (ALT 636 FOR OP/ED): Performed by: INTERNAL MEDICINE

## 2025-03-21 RX ORDER — HYDRALAZINE HYDROCHLORIDE 25 MG/1
25 TABLET, FILM COATED ORAL 3 TIMES DAILY
Status: DISCONTINUED | OUTPATIENT
Start: 2025-03-21 | End: 2025-03-22

## 2025-03-21 RX ORDER — MIDAZOLAM HYDROCHLORIDE 1 MG/ML
INJECTION, SOLUTION INTRAMUSCULAR; INTRAVENOUS AS NEEDED
Status: DISCONTINUED | OUTPATIENT
Start: 2025-03-21 | End: 2025-03-21 | Stop reason: HOSPADM

## 2025-03-21 RX ORDER — LIDOCAINE HYDROCHLORIDE 10 MG/ML
INJECTION, SOLUTION EPIDURAL; INFILTRATION; INTRACAUDAL; PERINEURAL AS NEEDED
Status: DISCONTINUED | OUTPATIENT
Start: 2025-03-21 | End: 2025-03-21 | Stop reason: HOSPADM

## 2025-03-21 RX ORDER — HEPARIN SODIUM 1000 [USP'U]/ML
INJECTION, SOLUTION INTRAVENOUS; SUBCUTANEOUS AS NEEDED
Status: DISCONTINUED | OUTPATIENT
Start: 2025-03-21 | End: 2025-03-21 | Stop reason: HOSPADM

## 2025-03-21 RX ORDER — FENTANYL CITRATE 50 UG/ML
INJECTION, SOLUTION INTRAMUSCULAR; INTRAVENOUS AS NEEDED
Status: DISCONTINUED | OUTPATIENT
Start: 2025-03-21 | End: 2025-03-21 | Stop reason: HOSPADM

## 2025-03-21 RX ORDER — SODIUM CHLORIDE 9 MG/ML
75 INJECTION, SOLUTION INTRAVENOUS CONTINUOUS
Status: ACTIVE | OUTPATIENT
Start: 2025-03-21 | End: 2025-03-21

## 2025-03-21 RX ORDER — NITROGLYCERIN 40 MG/100ML
INJECTION INTRAVENOUS AS NEEDED
Status: DISCONTINUED | OUTPATIENT
Start: 2025-03-21 | End: 2025-03-21 | Stop reason: HOSPADM

## 2025-03-21 RX ORDER — SODIUM CHLORIDE, SODIUM LACTATE, POTASSIUM CHLORIDE, CALCIUM CHLORIDE 600; 310; 30; 20 MG/100ML; MG/100ML; MG/100ML; MG/100ML
INJECTION, SOLUTION INTRAVENOUS CONTINUOUS PRN
Status: COMPLETED | OUTPATIENT
Start: 2025-03-21 | End: 2025-03-21

## 2025-03-21 RX ADMIN — ISOSORBIDE MONONITRATE 30 MG: 30 TABLET, EXTENDED RELEASE ORAL at 08:32

## 2025-03-21 RX ADMIN — HYDRALAZINE HYDROCHLORIDE 25 MG: 25 TABLET ORAL at 15:21

## 2025-03-21 RX ADMIN — CHOLECALCIFEROL TAB 25 MCG (1000 UNIT) 1000 UNITS: 25 TAB at 08:32

## 2025-03-21 RX ADMIN — POLYETHYLENE GLYCOL 3350 17 G: 17 POWDER, FOR SOLUTION ORAL at 08:32

## 2025-03-21 RX ADMIN — HEPARIN SODIUM 5000 UNITS: 5000 INJECTION, SOLUTION INTRAVENOUS; SUBCUTANEOUS at 05:38

## 2025-03-21 RX ADMIN — ASPIRIN 81 MG: 81 TABLET, COATED ORAL at 08:32

## 2025-03-21 RX ADMIN — ATORVASTATIN CALCIUM 40 MG: 40 TABLET, FILM COATED ORAL at 21:03

## 2025-03-21 RX ADMIN — HYDRALAZINE HYDROCHLORIDE 25 MG: 25 TABLET ORAL at 21:03

## 2025-03-21 RX ADMIN — HEPARIN SODIUM 5000 UNITS: 5000 INJECTION, SOLUTION INTRAVENOUS; SUBCUTANEOUS at 21:04

## 2025-03-21 RX ADMIN — CARVEDILOL 12.5 MG: 12.5 TABLET, FILM COATED ORAL at 08:32

## 2025-03-21 RX ADMIN — CARVEDILOL 12.5 MG: 12.5 TABLET, FILM COATED ORAL at 21:03

## 2025-03-21 ASSESSMENT — COGNITIVE AND FUNCTIONAL STATUS - GENERAL
DAILY ACTIVITIY SCORE: 24
DAILY ACTIVITIY SCORE: 24
MOBILITY SCORE: 24
MOBILITY SCORE: 24
DAILY ACTIVITIY SCORE: 24
DAILY ACTIVITIY SCORE: 24
MOBILITY SCORE: 24
DAILY ACTIVITIY SCORE: 24
MOBILITY SCORE: 24
DAILY ACTIVITIY SCORE: 24

## 2025-03-21 ASSESSMENT — PAIN - FUNCTIONAL ASSESSMENT
PAIN_FUNCTIONAL_ASSESSMENT: 0-10

## 2025-03-21 ASSESSMENT — PAIN SCALES - GENERAL
PAINLEVEL_OUTOF10: 0 - NO PAIN

## 2025-03-21 NOTE — HOSPITAL COURSE
Cornelia Macias is a 64 y.o. female with a past medical history of HFrEF (EF 45% -> 50-55%), multivessel CAD on previous LHC, DM2, Hep C s/p treatment, CKD3b, hyperlipidemia who presented as a direct admission for CABG evaluation.     Mrs. Macias was found to have multivessel coronary artery disease on left heart cath in October 2023 (mid LAD 85% + 90%; Diag#1 75%, midCirc 65%, midRCA 80%).  She was recommended CABG at that time but declined, preferring medical management of coronary artery disease.  However, she has continued to have progressive shortness of breath and fatigue.  She was recently referred to a new cardiologist who recommended she be seen inpatient for CABG evaluation.  On admission, she denied chest pain.     She was admitted on 3/19 and underwent left heart cath 3/21 which repeatedly revealed multivessel coronary artery disease (LAD: prox-mid 80% disease. Lcx: Mid just prior to bifurcation 70% disease. OM: severe ostial disease. RCA: Mid 70% disease).  Cardiac surgery was consulted who recommended inpatient CABG.     · Hyperlipidemia    · Hypertension    · Type 2 diabetes mellitus         Surgical History  Past Surgical History:  Procedure Laterality Date  · CARDIAC CATHETERIZATION N/A 3/21/2025    Procedure: Left Heart Cath;  Surgeon: Hussain Musa MD;  Location: [place] [address] Cardiac Cath Lab;  Service: Cardiovascular;  Laterality: N/A;       Prescriptions Prior to Admission  Medications Prior to Admission  Medication Sig Dispense Refill Last Dose/Taking  · aspirin 81 mg EC tablet Take 1 tablet (81 mg) by mouth once daily.        · atorvastatin (Lipitor) 20 mg tablet Take 1 tablet (20 mg) by mouth once daily. 90 tablet 1    · carvedilol (Coreg) 12.5 mg tablet Take 1 tablet (12.5 mg) by mouth 2 times a day. 180 tablet 1    · cholecalciferol (Vitamin D3) 25 MCG (1000 UT) tablet Take 1 tablet (1,000 Units) by mouth once daily. 90 tablet 1    · isosorbide mononitrate ER (Imdur) 30 mg 24 hr tablet Take 1 tablet (30 mg) by mouth once daily. 90 tablet 1    · Jardiance 10 mg Take 1 tablet (10 mg) by mouth once daily. 30 tablet 2    · magnesium glycinate 100 mg magnesium capsule Take 1 capsule (100 mg) by mouth once daily.        · valsartan (Diovan) 80 mg tablet Take 1 tablet (80 mg) by mouth once daily. 90 tablet 1         Lisinopril  Social History  Social History       Tobacco Use  · Smoking status: Every Day      Types: Cigarettes  · Smokeless tobacco: Never  Substance Use Topics  · Alcohol use: Not Currently  · Drug use: Never       Family HistoryExpand by Default  No family history on file.

## 2025-03-21 NOTE — CARE PLAN
Problem: Pain - Adult  Goal: Verbalizes/displays adequate comfort level or baseline comfort level  Outcome: Progressing     Problem: Chronic Conditions and Co-morbidities  Goal: Patient's chronic conditions and co-morbidity symptoms are monitored and maintained or improved  Outcome: Progressing     Problem: Diabetes  Goal: Maintain glucose levels >70mg/dl to <250mg/dl throughout shift  Outcome: Progressing       The clinical goals for the shift include Pt's BP and HR will remain WNL throughout shift.

## 2025-03-21 NOTE — CARE PLAN
The patient's goals for the shift include      The clinical goals for the shift include pt will be up for meals and use call light prior to getting      Problem: Pain - Adult  Goal: Verbalizes/displays adequate comfort level or baseline comfort level  Outcome: Progressing     Problem: Safety - Adult  Goal: Free from fall injury  Outcome: Progressing     Problem: Discharge Planning  Goal: Discharge to home or other facility with appropriate resources  Outcome: Progressing     Problem: Chronic Conditions and Co-morbidities  Goal: Patient's chronic conditions and co-morbidity symptoms are monitored and maintained or improved  Outcome: Progressing     Problem: Nutrition  Goal: Nutrient intake appropriate for maintaining nutritional needs  Outcome: Progressing     Problem: Diabetes  Goal: Achieve decreasing blood glucose levels by end of shift  Outcome: Progressing  Goal: Increase stability of blood glucose readings by end of shift  Outcome: Progressing  Goal: Decrease in ketones present in urine by end of shift  Outcome: Progressing  Goal: Maintain electrolyte levels within acceptable range throughout shift  Outcome: Progressing  Goal: Maintain glucose levels >70mg/dl to <250mg/dl throughout shift  Outcome: Progressing  Goal: No changes in neurological exam by end of shift  Outcome: Progressing  Goal: Learn about and adhere to nutrition recommendations by end of shift  Outcome: Progressing  Goal: Vital signs within normal range for age by end of shift  Outcome: Progressing  Goal: Increase self care and/or family involovement by end of shift  Outcome: Progressing  Goal: Receive DSME education by end of shift  Outcome: Progressing     Problem: Pain  Goal: Takes deep breaths with improved pain control throughout the shift  Outcome: Progressing  Goal: Turns in bed with improved pain control throughout the shift  Outcome: Progressing  Goal: Walks with improved pain control throughout the shift  Outcome: Progressing  Goal:  Performs ADL's with improved pain control throughout shift  Outcome: Progressing  Goal: Participates in PT with improved pain control throughout the shift  Outcome: Progressing  Goal: Free from opioid side effects throughout the shift  Outcome: Progressing  Goal: Free from acute confusion related to pain meds throughout the shift  Outcome: Progressing

## 2025-03-21 NOTE — PROGRESS NOTES
Subjective  Ms. Bukr is walking the [place] this morning.  States that she is feeling well. No chest pain. Or shortness of breath this am.  Denies PND, orthopnea, lower extremity edema.   Objective     Vital signs:  Temp:  [35.9 °C (96.6 °F)-36.9 °C (98.4 °F)] 35.9 °C (96.6 °F)  Heart Rate:  [65-84] 66  Resp:  [18] 18  BP: (133-158)/() 158/102     Recent Labs:  Results from last 72 hours   Lab Units 03/21/25  0602 03/20/25  0814 03/19/25  2110   SODIUM mmol/L 141 138 143   POTASSIUM mmol/L 4.6 4.0 3.9   CHLORIDE mmol/L 107 105 108*   CO2 mmol/L 26 25 26   BUN mg/dL 33* 23 26*   CREATININE mg/dL 2.02* 1.86* 2.07*   GLUCOSE mg/dL 96 78 165*   CALCIUM mg/dL 9.8 9.7 9.8   ANION GAP mmol/L 13 12 13   EGFR mL/min/1.73m*2 27* 30* 26*   PHOSPHORUS mg/dL 3.6 3.2  --       Results from last 72 hours   Lab Units 03/21/25  0602 03/20/25  0814 03/19/25  2110   WBC AUTO x10*3/uL 5.2 5.0 5.7   HEMOGLOBIN g/dL 10.1* 10.2* 10.1*   HEMATOCRIT % 31.9* 32.6* 31.1*   PLATELETS AUTO x10*3/uL 168 175 173   NEUTROS PCT AUTO % 35.1 31.3  --    LYMPHS PCT AUTO % 53.6 55.7  --    MONOS PCT AUTO % 9.2 11.0  --    EOS PCT AUTO % 1.3 1.6  --                Recent Imaging:  Transthoracic Echo (TTE) Complete  Result Date: 3/20/2025    CONCLUSIONS:   1. Left ventricular ejection fraction is low normal, by visual estimate at 50-55%.   2. Spectral Doppler shows a Grade I (impaired relaxation pattern) of left ventricular diastolic filling with normal left atrial filling pressure.   3. There is normal right ventricular global systolic function.   4. Right ventricular systolic pressure is within normal limits.    ECG 12 Lead  Result Date: 3/20/2025  Normal sinus rhythm Possible Left atrial enlargement Septal infarct , age undetermined ST & T wave abnormality, consider inferolateral ischemia Abnormal ECG No previous ECGs available    Physical Exam  General: Patient is awake, alert, conversant. Not acutely distressed.   HEENT: Pupils are equal and  round, no scleral icterus or conjunctivitis  Chest: Breathing appears comfortable on room air. Chest movement symmetric. Normal respiratory effort. No adventitious lung sounds on auscultation.   Cardiac: Normal rate, regular rhythm. Normal S1, S2. No murmur appreciated. Radial pulses 2+, pedal pulses 2+.   Volume: Mucous membranes moist. No lower extremity edema.   Abdomen: Bowel sounds are active. Abdomen is soft and non tender to palpation.   EXT: Upper and lower extremities are atraumatic in appearance without tenderness or deformity. No swelling or erythema.   MSK: No joint swelling appreciated. Grossly full active ROM  Skin: No lesions or rashes noted to exposed skin  Neuro: The patient is awake, alert and oriented to person, place, and time. Motor function is normal with muscle strength 5/5 bilaterally to upper and lower extremities. Sensation to light touch is intact bilaterally. No gait abnormalities noted.  Psych: Appropriate mood and affect. Appropriate judgement and insight.      Assessment and Plan    Ms Macias is a 64 F with a PMHx HFrEF (EF 45% -> 50-55%), CAD, DM2, Hep C s/p treatment, CKD, HLD who presents as direct admission for CABG evaluation. Pending repeat [facility], has been stable and asymptomatic.    24-hour updates:  -Cardiac surgery following, will re-engage post [facility]  - [facility] today  - Will start hydral 25mg TID while inpatient for BP control    #Triple vessel CAD   #CABG evaluation   - [facility] in 10/23: 3 vessel disease (mid LAD 85% + 90%; Diag#1 75%, midCirc 65%, midRCA 80%)   -TTE 07/23: EF 45%  -Repeat TTE 3/20: EF 50-55%  -Lipid panel: Total cholesterol: 165, LDL 85, HDL 43.7, Triglycerides 183  -CABG previously recommended, patient declined.   -Evaluated by outaptient cardiology, directly admitted for CABG evaluation. Cardiac surgery reviewing case.    Plan:  -Cardiac surgery consulted, will re-engage following [facility]  -Atorvastatin 40 mg PO daily  -Aspirin 81 mg PO daily  -Carvedilol 12.5 mg PO  BID    #HFmrEF  #Ischemic Cardiomyopathy  -TTE 07/23: EF 45%  -Repeat TTE 3/20: EF 50-55%    Plan:  - Holding home valsartan and Jardiance iso CABG  - Continue home coreg 12.5 mg BID   - Continue Imdur 30 mg PO Daily  - repeat TTE   - Will start hydral 25mg TID while inpatient for BP control    #DM T2  HgA1c: 6.5% 3/19    Plan:  -SSI while inpatient     #CKD Stage 3b  -Unclear etiology  -Baseline on chart review: 1.5-2  -eGFR this admission: 30    Plan:  -Continue to trend RFP  -Avoid nephrotoxic agents as possible  -Strict I/O's    #Hepatitis C - cured  #Preivous Liver Cirrhosis  -S/p Mavyret in 2018 with sustained virologic response.   -Previous concern for stage 2 fibrosis on fibroscan. Repeat ordered by GI ([facility]) in 2024, results unclear.   -Hepatic panel WNL on admission    Plan:  -Continue to monitor    Disposition  -Barriers: Pending possible CABG  -Destination: Home  -Follow-up: Cardiology, PCP, likely Cardiac Surgery    Diet: Cardiac  Electrolytes: K >4; Mg >2  DVT ppx: Heparin SQ  GI ppx: Not indicated   Lines/tubes: PIV  O2: RA  Drips: None  Baseline Cr: 1.5-2  Code status: Full  Surrogate decision maker: Mary (daughter) [phone]     Andrae (Melo) MD Jose  IM PGY-1

## 2025-03-21 NOTE — INTERVAL H&P NOTE
H&P reviewed. The patient was examined and there are no changes to the H&P. Patient denies any chest pain, reports occasional SOB, and a few years of activity intolerance that she has learned to live with. Currently euvolemic, 2+ pulses in all 4 extremities. GFR <60, will add gentle IVF hydration.

## 2025-03-21 NOTE — POST-PROCEDURE NOTE
Physician Transition of Care Summary  Invasive Cardiovascular Lab    Procedure Date: 3/21/2025  Attending:    * Hussain Musa - Primary  Resident/Fellow/Other Assistant: Surgeons and Role:     * Ricky Mei MD - Fellow    Indications:   Pre-op Diagnosis      * CAD, multiple vessel [I25.10]     * Ischemic cardiomyopathy [I25.5]    Post-procedure diagnosis:   Post-op Diagnosis     * CAD, multiple vessel [I25.10]     * Ischemic cardiomyopathy [I25.5]    Procedure(s):   Left Heart Cath  09019 - MD L HRT CATH W/NJX L VENTRICULOGRAPHY IMG S&I        Procedure Findings:   Coronary angiogram:  Right dominant system.   LM: patent  LAD: prox-mid 80% disease  Lcx: Mid just prior to bifurcation 70% disease  OM: severe ostial disease  RCA: Mid 70% disease    LVEDP ~20 mmHg    Description of the Procedure:   Access: Right radial artery, 6 Fr  Closure: TR band    Complications:   None    Stents/Implants:   Implants       No implant documentation for this case.            Anticoagulation/Antiplatelet Plan:   Per primary team    Estimated Blood Loss:   * No values recorded between 3/21/2025 11:01 AM and 3/21/2025 12:18 PM *    Anesthesia: Moderate Sedation Anesthesia Staff: No anesthesia staff entered.    Any Specimen(s) Removed:   No specimens collected during this procedure.    Disposition:   Inpatient LT5      Electronically signed by: Ricky Mei MD, 3/21/2025 12:18 PM

## 2025-03-21 NOTE — DISCHARGE INSTRUCTIONS
Dear Ms. Colleen,  You were admitted to the hospital on 3/19 for evluation due to your known coronary artery disease.  You were evaluated by our cardiac surgery colleagues who recommended ***  You underwent a heart catheterization procedure which showed continued ***    CARDIAC CATHETERIZATION DISCHARGE INSTRUCTIONS (procedure done on 3/21/25)     FOR SUDDEN AND SEVERE CHEST PAIN, SHORTNESS OF BREATH, EXCESSIVE BLEEDING, SIGNS OF STROKE, OR CHANGES IN MENTAL STATUS YOU SHOULD CALL 911 IMMEDIATELY.     If your provider has prescribed aspirin and/or clopidogrel (Plavix), or prasugrel (Effient), or ticagrelor (Brilinta), DO NOT STOP THESE MEDICATIONS for any reason without talking to your cardiologist first. If any of these were prescribed, you must take them every day without missing a single dose. If you are getting low on these medications, contact your provider immediately for a refill.     FOR NEXT 24 HOURS  - Upon discharge, you should return home and rest for the remainder of the day and evening. You do not have to stay on bed rest but should not be very active.  It is recommended a responsible adult be with you for the first 24 hours after the procedure.    - No driving for 24 hours after procedure. Please arrange for someone to drive you home from the hospital today.     - Do not drive, operate machinery, or use power tools for 24 hours after your procedure.     - Do not make any legal decisions for 24 hours after your procedure.     - Do not drink alcoholic beverages for 24 hours after your procedure.    WOUND CARE   *FOR RADIAL (WRIST) ACCESS*  ·      No lifting more than 5 pounds or excessive use of the wrist for 24 hours - for example, treat your wrist as if it is sprained.  ·      Do not engage in vigorous activities (tennis, golf, bowling, weights) for at least 48 hours after the procedure.  ·      Do not submerge the wrist for 7 days after the procedure.  ·      You should expect mild tingling in  your hand and tenderness at the puncture site for up to 3 days.    - The transparent dressing should be removed from the site 24 hours after the procedure.  Wash the site gently with soap and water. Rinse well and pat dry. Keep the area clean and dry. You may apply a Band-Aid to the site. Avoid lotions, ointments, or powders until fully healed.     - You may shower the day after your procedure.      - It is normal to notice a small bruise around the puncture site and/or a small grape sized or smaller lump. Any large bruising or large lump warrants a call to the office.     - If bleeding should occur, lay down and apply pressure to the affected area for 10 minutes.  If the bleeding stops notify your physician.  If there is a large amount of bleeding or spurting of blood CALL 911 immediately.  DO NOT drive yourself to the hospital.    - You may experience some tenderness, bruising or minimal inflammation.  If you have any concerns, you may contact the Cath Lab or if any of these symptoms become excessive, contact your cardiologist or go to the emergency room.     OTHER INSTRUCTIONS  - You may take acetaminophen (Tylenol) as directed for discomfort.  If pain is not relieved with acetaminophen (Tylenol), contact your doctor.    - If you notice or experience any of the following, you should notify your doctor or seek medical attention  Chest pain or discomfort  Change in mental status or weakness in extremities.  Dizziness, light headedness, or feeling faint.  Change in the site where the procedure was performed, such as bleeding or an increased area of bruising or swelling.  Tingling, numbness, pain, or coolness in the leg/arm beyond the site where the procedure was performed.  Signs of infection (i.e. shaking chills, temperature > 100 degrees Fahrenheit, warmth, redness) in the leg/arm area where the procedure was performed.  Changes in urination   Bloody or black stools  Vomiting blood  Severe nose bleeds  Any  excessive bleeding    - If you DO NOT have an appointment with your cardiologist within 2-4 weeks following your procedure, please contact their office.

## 2025-03-22 LAB
ALBUMIN SERPL BCP-MCNC: 3.6 G/DL (ref 3.4–5)
ANION GAP SERPL CALC-SCNC: 12 MMOL/L (ref 10–20)
BASOPHILS # BLD AUTO: 0.01 X10*3/UL (ref 0–0.1)
BASOPHILS NFR BLD AUTO: 0.2 %
BUN SERPL-MCNC: 28 MG/DL (ref 6–23)
CALCIUM SERPL-MCNC: 9.5 MG/DL (ref 8.6–10.6)
CHLORIDE SERPL-SCNC: 109 MMOL/L (ref 98–107)
CO2 SERPL-SCNC: 25 MMOL/L (ref 21–32)
CREAT SERPL-MCNC: 1.95 MG/DL (ref 0.5–1.05)
EGFRCR SERPLBLD CKD-EPI 2021: 28 ML/MIN/1.73M*2
EOSINOPHIL # BLD AUTO: 0.06 X10*3/UL (ref 0–0.7)
EOSINOPHIL NFR BLD AUTO: 1.3 %
ERYTHROCYTE [DISTWIDTH] IN BLOOD BY AUTOMATED COUNT: 13.4 % (ref 11.5–14.5)
GLUCOSE BLD MANUAL STRIP-MCNC: 138 MG/DL (ref 74–99)
GLUCOSE BLD MANUAL STRIP-MCNC: 75 MG/DL (ref 74–99)
GLUCOSE BLD MANUAL STRIP-MCNC: 99 MG/DL (ref 74–99)
GLUCOSE SERPL-MCNC: 98 MG/DL (ref 74–99)
HCT VFR BLD AUTO: 30.5 % (ref 36–46)
HGB BLD-MCNC: 9.9 G/DL (ref 12–16)
IMM GRANULOCYTES # BLD AUTO: 0.01 X10*3/UL (ref 0–0.7)
IMM GRANULOCYTES NFR BLD AUTO: 0.2 % (ref 0–0.9)
LYMPHOCYTES # BLD AUTO: 2.16 X10*3/UL (ref 1.2–4.8)
LYMPHOCYTES NFR BLD AUTO: 47.8 %
MAGNESIUM SERPL-MCNC: 2.27 MG/DL (ref 1.6–2.4)
MCH RBC QN AUTO: 30.4 PG (ref 26–34)
MCHC RBC AUTO-ENTMCNC: 32.5 G/DL (ref 32–36)
MCV RBC AUTO: 94 FL (ref 80–100)
MONOCYTES # BLD AUTO: 0.45 X10*3/UL (ref 0.1–1)
MONOCYTES NFR BLD AUTO: 10 %
NEUTROPHILS # BLD AUTO: 1.83 X10*3/UL (ref 1.2–7.7)
NEUTROPHILS NFR BLD AUTO: 40.5 %
NRBC BLD-RTO: 0 /100 WBCS (ref 0–0)
PHOSPHATE SERPL-MCNC: 3.1 MG/DL (ref 2.5–4.9)
PLATELET # BLD AUTO: 167 X10*3/UL (ref 150–450)
POTASSIUM SERPL-SCNC: 4.5 MMOL/L (ref 3.5–5.3)
RBC # BLD AUTO: 3.26 X10*6/UL (ref 4–5.2)
SODIUM SERPL-SCNC: 141 MMOL/L (ref 136–145)
WBC # BLD AUTO: 4.5 X10*3/UL (ref 4.4–11.3)

## 2025-03-22 PROCEDURE — 2500000001 HC RX 250 WO HCPCS SELF ADMINISTERED DRUGS (ALT 637 FOR MEDICARE OP)

## 2025-03-22 PROCEDURE — 2500000004 HC RX 250 GENERAL PHARMACY W/ HCPCS (ALT 636 FOR OP/ED)

## 2025-03-22 PROCEDURE — 36415 COLL VENOUS BLD VENIPUNCTURE: CPT

## 2025-03-22 PROCEDURE — 82947 ASSAY GLUCOSE BLOOD QUANT: CPT

## 2025-03-22 PROCEDURE — 83735 ASSAY OF MAGNESIUM: CPT

## 2025-03-22 PROCEDURE — 1200000002 HC GENERAL ROOM WITH TELEMETRY DAILY

## 2025-03-22 PROCEDURE — 80069 RENAL FUNCTION PANEL: CPT

## 2025-03-22 PROCEDURE — 99232 SBSQ HOSP IP/OBS MODERATE 35: CPT | Performed by: INTERNAL MEDICINE

## 2025-03-22 PROCEDURE — 85025 COMPLETE CBC W/AUTO DIFF WBC: CPT

## 2025-03-22 RX ORDER — HYDRALAZINE HYDROCHLORIDE 50 MG/1
50 TABLET, FILM COATED ORAL 3 TIMES DAILY
Status: DISCONTINUED | OUTPATIENT
Start: 2025-03-22 | End: 2025-03-24

## 2025-03-22 RX ORDER — SIMETHICONE 80 MG
40 TABLET,CHEWABLE ORAL ONCE
Status: COMPLETED | OUTPATIENT
Start: 2025-03-23 | End: 2025-03-23

## 2025-03-22 RX ADMIN — HYDRALAZINE HYDROCHLORIDE 25 MG: 25 TABLET ORAL at 08:09

## 2025-03-22 RX ADMIN — HEPARIN SODIUM 5000 UNITS: 5000 INJECTION, SOLUTION INTRAVENOUS; SUBCUTANEOUS at 20:25

## 2025-03-22 RX ADMIN — HYDRALAZINE HYDROCHLORIDE 50 MG: 50 TABLET ORAL at 15:02

## 2025-03-22 RX ADMIN — HEPARIN SODIUM 5000 UNITS: 5000 INJECTION, SOLUTION INTRAVENOUS; SUBCUTANEOUS at 05:10

## 2025-03-22 RX ADMIN — ISOSORBIDE MONONITRATE 30 MG: 30 TABLET, EXTENDED RELEASE ORAL at 08:09

## 2025-03-22 RX ADMIN — ATORVASTATIN CALCIUM 40 MG: 40 TABLET, FILM COATED ORAL at 20:25

## 2025-03-22 RX ADMIN — HEPARIN SODIUM 5000 UNITS: 5000 INJECTION, SOLUTION INTRAVENOUS; SUBCUTANEOUS at 12:02

## 2025-03-22 RX ADMIN — POLYETHYLENE GLYCOL 3350 17 G: 17 POWDER, FOR SOLUTION ORAL at 08:09

## 2025-03-22 RX ADMIN — CARVEDILOL 12.5 MG: 12.5 TABLET, FILM COATED ORAL at 08:09

## 2025-03-22 RX ADMIN — CHOLECALCIFEROL TAB 25 MCG (1000 UNIT) 1000 UNITS: 25 TAB at 08:09

## 2025-03-22 RX ADMIN — CARVEDILOL 12.5 MG: 12.5 TABLET, FILM COATED ORAL at 20:25

## 2025-03-22 RX ADMIN — HYDRALAZINE HYDROCHLORIDE 50 MG: 50 TABLET ORAL at 20:25

## 2025-03-22 RX ADMIN — ASPIRIN 81 MG: 81 TABLET, COATED ORAL at 08:09

## 2025-03-22 ASSESSMENT — PAIN - FUNCTIONAL ASSESSMENT
PAIN_FUNCTIONAL_ASSESSMENT: 0-10
PAIN_FUNCTIONAL_ASSESSMENT: 0-10

## 2025-03-22 ASSESSMENT — COGNITIVE AND FUNCTIONAL STATUS - GENERAL
MOBILITY SCORE: 24
DAILY ACTIVITIY SCORE: 24
MOBILITY SCORE: 24
DAILY ACTIVITIY SCORE: 24
MOBILITY SCORE: 24

## 2025-03-22 ASSESSMENT — PAIN SCALES - GENERAL
PAINLEVEL_OUTOF10: 0 - NO PAIN

## 2025-03-22 NOTE — PROGRESS NOTES
Subjective    MsGuicho Burk is resting in bed this morning, no acute events overnight. No chest pain. Continues to have some fatigue when up walking, currently no shortness of breath.      Objective     Vital signs:  Temp:  [36 °C (96.8 °F)-36.8 °C (98.2 °F)] 36 °C (96.8 °F)  Heart Rate:  [61-86] 74  Resp:  [12-17] 17  BP: (114-172)/(51-98) 172/93     Recent Labs:  Results from last 72 hours   Lab Units 03/22/25  0653 03/21/25  0602 03/20/25  0814   SODIUM mmol/L 141 141 138   POTASSIUM mmol/L 4.5 4.6 4.0   CHLORIDE mmol/L 109* 107 105   CO2 mmol/L 25 26 25   BUN mg/dL 28* 33* 23   CREATININE mg/dL 1.95* 2.02* 1.86*   GLUCOSE mg/dL 98 96 78   CALCIUM mg/dL 9.5 9.8 9.7   ANION GAP mmol/L 12 13 12   EGFR mL/min/1.73m*2 28* 27* 30*   PHOSPHORUS mg/dL 3.1 3.6 3.2      Results from last 72 hours   Lab Units 03/22/25  0653 03/21/25  0602 03/20/25  0814   WBC AUTO x10*3/uL 4.5 5.2 5.0   HEMOGLOBIN g/dL 9.9* 10.1* 10.2*   HEMATOCRIT % 30.5* 31.9* 32.6*   PLATELETS AUTO x10*3/uL 167 168 175   NEUTROS PCT AUTO % 40.5 35.1 31.3   LYMPHS PCT AUTO % 47.8 53.6 55.7   MONOS PCT AUTO % 10.0 9.2 11.0   EOS PCT AUTO % 1.3 1.3 1.6               Recent Imaging:  Transthoracic Echo (TTE) Complete  Result Date: 3/20/2025    CONCLUSIONS:   1. Left ventricular ejection fraction is low normal, by visual estimate at 50-55%.   2. Spectral Doppler shows a Grade I (impaired relaxation pattern) of left ventricular diastolic filling with normal left atrial filling pressure.   3. There is normal right ventricular global systolic function.   4. Right ventricular systolic pressure is within normal limits.    ECG 12 Lead  Result Date: 3/20/2025  Normal sinus rhythm Possible Left atrial enlargement Septal infarct , age undetermined ST & T wave abnormality, consider inferolateral ischemia Abnormal ECG No previous ECGs available    Physical Exam  General: Patient is awake, alert, conversant. Not acutely distressed.   HEENT: Pupils are equal and round, no  scleral icterus or conjunctivitis  Chest: Breathing appears comfortable on room air. Chest movement symmetric. Normal respiratory effort. No adventitious lung sounds on auscultation.   Cardiac: Normal rate, regular rhythm. Normal S1, S2. No murmur appreciated. Radial pulses 2+, pedal pulses 2+.   Volume: Mucous membranes moist. No lower extremity edema.   Abdomen: Bowel sounds are active. Abdomen is soft and non tender to palpation.   EXT: Upper and lower extremities are atraumatic in appearance without tenderness or deformity. No swelling or erythema.   MSK: No joint swelling appreciated. Grossly full active ROM  Skin: No lesions or rashes noted to exposed skin  Neuro: The patient is awake, alert and oriented to person, place, and time. Motor function is normal with muscle strength 5/5 bilaterally to upper and lower extremities. Sensation to light touch is intact bilaterally. No gait abnormalities noted.  Psych: Appropriate mood and affect. Appropriate judgement and insight.      Assessment and Plan    Ms Macias is a 64 F with a PMHx HFrEF (EF 45% -> 50-55%), CAD, DM2, Hep C s/p treatment, CKD, HLD who presents as direct admission for CABG evaluation. Planning for CABG next week, likely 3/26.     24-hour updates:  -Cardiac surgery following, will remain admitted for CABG in the next week most likely.   - Increase hydralazine to 50 mg TID while inpatient for HTN    #Triple vessel CAD   #CABG evaluation   - [facility] in 10/23: 3 vessel disease (mid LAD 85% + 90%; Diag#1 75%, midCirc 65%, midRCA 80%)   -TTE 07/23: EF 45%  -Repeat TTE 3/20: EF 50-55%  -Lipid panel: Total cholesterol: 165, LDL 85, HDL 43.7, Triglycerides 183  -CABG previously recommended, patient declined.   -Evaluated by outaptient cardiology, directly admitted for CABG evaluation. Cardiac surgery reviewing case.    Plan:  -Cardiac surgery consulted, appreciate recommendations  -Atorvastatin 40 mg PO daily  -Aspirin 81 mg PO daily  -Carvedilol 12.5 mg PO  BID    #HFmrEF  #Ischemic Cardiomyopathy  -TTE 07/23: EF 45%  -Repeat TTE 3/20: EF 50-55%    Plan:  - Holding home valsartan and Jardiance iso CABG  - Continue home coreg 12.5 mg BID   - Continue Imdur 30 mg PO Daily  - repeat TTE   - Increase Hydralazine to 50 mg TID     #DM T2  HgA1c: 6.5% 3/19    Plan:  -SSI while inpatient     #CKD Stage 3b  -Unclear etiology  -Baseline on chart review: 1.5-2  -eGFR this admission: 30    Plan:  -Continue to trend RFP  -Avoid nephrotoxic agents as possible  -Strict I/O's    #Hepatitis C - cured  #Preivous Liver Cirrhosis  -S/p Mavyret in 2018 with sustained virologic response.   -Previous concern for stage 2 fibrosis on fibroscan. Repeat ordered by GI ([facility]) in 2024, results unclear.   -Hepatic panel WNL on admission    Plan:  -Continue to monitor    Disposition  -Barriers: Pending CABG  -Destination: Home  -Follow-up: Cardiology, PCP, Cardiac Surgery    Diet: Cardiac  Electrolytes: K >4; Mg >2  DVT ppx: Heparin SQ  GI ppx: Not indicated   Lines/tubes: PIV  O2: RA  Drips: None  Baseline Cr: 1.5-2  Code status: Full  Surrogate decision maker: Mary (daughter) [phone]     Carol Meyer MD  IM PGY-1

## 2025-03-22 NOTE — CARE PLAN
Problem: Pain - Adult  Goal: Verbalizes/displays adequate comfort level or baseline comfort level  Outcome: Progressing     Problem: Diabetes  Goal: Maintain glucose levels >70mg/dl to <250mg/dl throughout shift  Outcome: Progressing     Problem: Safety - Adult  Goal: Free from fall injury  Outcome: Progressing       The clinical goals for the shift include Pt's BP and HR will remain WNL throughout shift.

## 2025-03-23 VITALS
DIASTOLIC BLOOD PRESSURE: 67 MMHG | SYSTOLIC BLOOD PRESSURE: 158 MMHG | TEMPERATURE: 98.1 F | BODY MASS INDEX: 23.94 KG/M2 | OXYGEN SATURATION: 99 % | RESPIRATION RATE: 18 BRPM | HEIGHT: 62 IN | WEIGHT: 130.07 LBS | HEART RATE: 88 BPM

## 2025-03-23 LAB
ALBUMIN SERPL BCP-MCNC: 3.7 G/DL (ref 3.4–5)
ANION GAP SERPL CALC-SCNC: 13 MMOL/L (ref 10–20)
ATRIAL RATE: 68 BPM
BASOPHILS # BLD AUTO: 0.01 X10*3/UL (ref 0–0.1)
BASOPHILS NFR BLD AUTO: 0.2 %
BUN SERPL-MCNC: 35 MG/DL (ref 6–23)
CALCIUM SERPL-MCNC: 9.6 MG/DL (ref 8.6–10.6)
CHLORIDE SERPL-SCNC: 109 MMOL/L (ref 98–107)
CO2 SERPL-SCNC: 23 MMOL/L (ref 21–32)
CREAT SERPL-MCNC: 2.49 MG/DL (ref 0.5–1.05)
EGFRCR SERPLBLD CKD-EPI 2021: 21 ML/MIN/1.73M*2
EOSINOPHIL # BLD AUTO: 0.07 X10*3/UL (ref 0–0.7)
EOSINOPHIL NFR BLD AUTO: 1.2 %
ERYTHROCYTE [DISTWIDTH] IN BLOOD BY AUTOMATED COUNT: 14 % (ref 11.5–14.5)
GLUCOSE BLD MANUAL STRIP-MCNC: 108 MG/DL (ref 74–99)
GLUCOSE BLD MANUAL STRIP-MCNC: 193 MG/DL (ref 74–99)
GLUCOSE BLD MANUAL STRIP-MCNC: 302 MG/DL (ref 74–99)
GLUCOSE BLD MANUAL STRIP-MCNC: 82 MG/DL (ref 74–99)
GLUCOSE SERPL-MCNC: 103 MG/DL (ref 74–99)
HCT VFR BLD AUTO: 31.2 % (ref 36–46)
HGB BLD-MCNC: 9.8 G/DL (ref 12–16)
IMM GRANULOCYTES # BLD AUTO: 0.01 X10*3/UL (ref 0–0.7)
IMM GRANULOCYTES NFR BLD AUTO: 0.2 % (ref 0–0.9)
LYMPHOCYTES # BLD AUTO: 2.65 X10*3/UL (ref 1.2–4.8)
LYMPHOCYTES NFR BLD AUTO: 44.4 %
MAGNESIUM SERPL-MCNC: 2.3 MG/DL (ref 1.6–2.4)
MCH RBC QN AUTO: 30.3 PG (ref 26–34)
MCHC RBC AUTO-ENTMCNC: 31.4 G/DL (ref 32–36)
MCV RBC AUTO: 97 FL (ref 80–100)
MONOCYTES # BLD AUTO: 0.68 X10*3/UL (ref 0.1–1)
MONOCYTES NFR BLD AUTO: 11.4 %
NEUTROPHILS # BLD AUTO: 2.55 X10*3/UL (ref 1.2–7.7)
NEUTROPHILS NFR BLD AUTO: 42.6 %
NRBC BLD-RTO: 0 /100 WBCS (ref 0–0)
P AXIS: 70 DEGREES
P OFFSET: 211 MS
P ONSET: 151 MS
PHOSPHATE SERPL-MCNC: 3.3 MG/DL (ref 2.5–4.9)
PLATELET # BLD AUTO: 173 X10*3/UL (ref 150–450)
POTASSIUM SERPL-SCNC: 4.5 MMOL/L (ref 3.5–5.3)
PR INTERVAL: 150 MS
Q ONSET: 226 MS
QRS COUNT: 11 BEATS
QRS DURATION: 78 MS
QT INTERVAL: 400 MS
QTC CALCULATION(BAZETT): 425 MS
QTC FREDERICIA: 417 MS
R AXIS: 22 DEGREES
RBC # BLD AUTO: 3.23 X10*6/UL (ref 4–5.2)
SODIUM SERPL-SCNC: 140 MMOL/L (ref 136–145)
T AXIS: -32 DEGREES
T OFFSET: 426 MS
VENTRICULAR RATE: 68 BPM
WBC # BLD AUTO: 6 X10*3/UL (ref 4.4–11.3)

## 2025-03-23 PROCEDURE — 83735 ASSAY OF MAGNESIUM: CPT

## 2025-03-23 PROCEDURE — 82947 ASSAY GLUCOSE BLOOD QUANT: CPT

## 2025-03-23 PROCEDURE — 2500000001 HC RX 250 WO HCPCS SELF ADMINISTERED DRUGS (ALT 637 FOR MEDICARE OP)

## 2025-03-23 PROCEDURE — 36415 COLL VENOUS BLD VENIPUNCTURE: CPT

## 2025-03-23 PROCEDURE — 99232 SBSQ HOSP IP/OBS MODERATE 35: CPT

## 2025-03-23 PROCEDURE — 85025 COMPLETE CBC W/AUTO DIFF WBC: CPT

## 2025-03-23 PROCEDURE — 2500000002 HC RX 250 W HCPCS SELF ADMINISTERED DRUGS (ALT 637 FOR MEDICARE OP, ALT 636 FOR OP/ED)

## 2025-03-23 PROCEDURE — 1200000002 HC GENERAL ROOM WITH TELEMETRY DAILY

## 2025-03-23 PROCEDURE — 2500000004 HC RX 250 GENERAL PHARMACY W/ HCPCS (ALT 636 FOR OP/ED)

## 2025-03-23 PROCEDURE — 80069 RENAL FUNCTION PANEL: CPT

## 2025-03-23 RX ORDER — ACETAMINOPHEN 325 MG/1
975 TABLET ORAL 3 TIMES DAILY PRN
Status: DISCONTINUED | OUTPATIENT
Start: 2025-03-23 | End: 2025-03-26

## 2025-03-23 RX ADMIN — CARVEDILOL 12.5 MG: 12.5 TABLET, FILM COATED ORAL at 08:24

## 2025-03-23 RX ADMIN — INSULIN LISPRO 4 UNITS: 100 INJECTION, SOLUTION INTRAVENOUS; SUBCUTANEOUS at 12:19

## 2025-03-23 RX ADMIN — SIMETHICONE 40 MG: 80 TABLET, CHEWABLE ORAL at 00:00

## 2025-03-23 RX ADMIN — CHOLECALCIFEROL TAB 25 MCG (1000 UNIT) 1000 UNITS: 25 TAB at 08:24

## 2025-03-23 RX ADMIN — HEPARIN SODIUM 5000 UNITS: 5000 INJECTION, SOLUTION INTRAVENOUS; SUBCUTANEOUS at 04:48

## 2025-03-23 RX ADMIN — HYDRALAZINE HYDROCHLORIDE 50 MG: 50 TABLET ORAL at 15:54

## 2025-03-23 RX ADMIN — ATORVASTATIN CALCIUM 40 MG: 40 TABLET, FILM COATED ORAL at 20:03

## 2025-03-23 RX ADMIN — ASPIRIN 81 MG: 81 TABLET, COATED ORAL at 08:24

## 2025-03-23 RX ADMIN — HYDRALAZINE HYDROCHLORIDE 50 MG: 50 TABLET ORAL at 20:03

## 2025-03-23 RX ADMIN — POLYETHYLENE GLYCOL 3350 17 G: 17 POWDER, FOR SOLUTION ORAL at 08:24

## 2025-03-23 RX ADMIN — ACETAMINOPHEN 975 MG: 325 TABLET ORAL at 11:11

## 2025-03-23 RX ADMIN — HYDRALAZINE HYDROCHLORIDE 50 MG: 50 TABLET ORAL at 08:24

## 2025-03-23 RX ADMIN — CARVEDILOL 12.5 MG: 12.5 TABLET, FILM COATED ORAL at 20:03

## 2025-03-23 RX ADMIN — HEPARIN SODIUM 5000 UNITS: 5000 INJECTION, SOLUTION INTRAVENOUS; SUBCUTANEOUS at 12:07

## 2025-03-23 RX ADMIN — ISOSORBIDE MONONITRATE 30 MG: 30 TABLET, EXTENDED RELEASE ORAL at 08:24

## 2025-03-23 RX ADMIN — HEPARIN SODIUM 5000 UNITS: 5000 INJECTION, SOLUTION INTRAVENOUS; SUBCUTANEOUS at 20:03

## 2025-03-23 ASSESSMENT — PAIN SCALES - GENERAL
PAINLEVEL_OUTOF10: 0 - NO PAIN
PAINLEVEL_OUTOF10: 3

## 2025-03-23 ASSESSMENT — COGNITIVE AND FUNCTIONAL STATUS - GENERAL
DAILY ACTIVITIY SCORE: 24
DAILY ACTIVITIY SCORE: 24
MOBILITY SCORE: 24
MOBILITY SCORE: 24

## 2025-03-23 ASSESSMENT — PAIN DESCRIPTION - LOCATION: LOCATION: HEAD

## 2025-03-23 ASSESSMENT — PAIN - FUNCTIONAL ASSESSMENT
PAIN_FUNCTIONAL_ASSESSMENT: 0-10

## 2025-03-23 ASSESSMENT — PAIN DESCRIPTION - ORIENTATION: ORIENTATION: ANTERIOR

## 2025-03-23 NOTE — PROGRESS NOTES
Subjective    Ms. Macias is up in her room this morning.  States she had a good night overall.  No acute concerns.  Denies chest pain, shortness of breath.  Has been and up ambulating in the halls.  Is requesting Tylenol for intermittent headache.     Objective     Vital signs:  Temp:  [36 °C (96.8 °F)-37.1 °C (98.8 °F)] 36 °C (96.8 °F)  Heart Rate:  [74-97] 75  Resp:  [17-18] 18  BP: (130-172)/(56-93) 145/76     Recent Labs:  Results from last 72 hours   Lab Units 03/22/25  0653 03/21/25  0602 03/20/25  0814   SODIUM mmol/L 141 141 138   POTASSIUM mmol/L 4.5 4.6 4.0   CHLORIDE mmol/L 109* 107 105   CO2 mmol/L 25 26 25   BUN mg/dL 28* 33* 23   CREATININE mg/dL 1.95* 2.02* 1.86*   GLUCOSE mg/dL 98 96 78   CALCIUM mg/dL 9.5 9.8 9.7   ANION GAP mmol/L 12 13 12   EGFR mL/min/1.73m*2 28* 27* 30*   PHOSPHORUS mg/dL 3.1 3.6 3.2      Results from last 72 hours   Lab Units 03/22/25  0653 03/21/25  0602 03/20/25  0814   WBC AUTO x10*3/uL 4.5 5.2 5.0   HEMOGLOBIN g/dL 9.9* 10.1* 10.2*   HEMATOCRIT % 30.5* 31.9* 32.6*   PLATELETS AUTO x10*3/uL 167 168 175   NEUTROS PCT AUTO % 40.5 35.1 31.3   LYMPHS PCT AUTO % 47.8 53.6 55.7   MONOS PCT AUTO % 10.0 9.2 11.0   EOS PCT AUTO % 1.3 1.3 1.6               Recent Imaging:  Transthoracic Echo (TTE) Complete  Result Date: 3/20/2025    CONCLUSIONS:   1. Left ventricular ejection fraction is low normal, by visual estimate at 50-55%.   2. Spectral Doppler shows a Grade I (impaired relaxation pattern) of left ventricular diastolic filling with normal left atrial filling pressure.   3. There is normal right ventricular global systolic function.   4. Right ventricular systolic pressure is within normal limits.    ECG 12 Lead  Result Date: 3/20/2025  Normal sinus rhythm Possible Left atrial enlargement Septal infarct , age undetermined ST & T wave abnormality, consider inferolateral ischemia Abnormal ECG No previous ECGs available    Physical Exam  General: Patient is awake, alert,  conversant. Not acutely distressed.   HEENT: Pupils are equal and round, no scleral icterus or conjunctivitis  Chest: Breathing appears comfortable on room air. Chest movement symmetric. Normal respiratory effort. No adventitious lung sounds on auscultation.   Cardiac: Normal rate, regular rhythm. Normal S1, S2. No murmur appreciated. Radial pulses 2+, pedal pulses 2+.   Volume: Mucous membranes moist. No lower extremity edema.   Abdomen: Bowel sounds are active. Abdomen is soft and non tender to palpation.   EXT: Upper and lower extremities are atraumatic in appearance without tenderness or deformity. No swelling or erythema.   MSK: No joint swelling appreciated. Grossly full active ROM  Skin: No lesions or rashes noted to exposed skin  Neuro: The patient is awake, alert and oriented to person, place, and time. Motor function is normal with muscle strength 5/5 bilaterally to upper and lower extremities. Sensation to light touch is intact bilaterally. No gait abnormalities noted.  Psych: Appropriate mood and affect. Appropriate judgement and insight.      Assessment and Plan    Ms Macias is a 64 F with a PMHx HFrEF (EF 45% -> 50-55%), CAD, DM2, Hep C s/p treatment, CKD, HLD who presents as direct admission for CABG evaluation. Planning for CABG next week, likely 3/26.     24-hour updates:   - Cardiac surgery following, will remain admitted for CABG in the next week most likely.   - Tylenol PRN for headache    #Triple vessel CAD   #CABG evaluation   - [facility] in 10/23: 3 vessel disease (mid LAD 85% + 90%; Diag#1 75%, midCirc 65%, midRCA 80%)   -TTE 07/23: EF 45%  -Repeat TTE 3/20: EF 50-55%  -Lipid panel: Total cholesterol: 165, LDL 85, HDL 43.7, Triglycerides 183  -CABG previously recommended, patient declined.   -Evaluated by outaptient cardiology, directly admitted for CABG evaluation. Cardiac surgery reviewing case.    Plan:  -Cardiac surgery consulted, appreciate recommendations  -Atorvastatin 40 mg PO  daily  -Aspirin 81 mg PO daily  -Carvedilol 12.5 mg PO BID    #HFmrEF  #Ischemic Cardiomyopathy  -TTE 07/23: EF 45%  -Repeat TTE 3/20: EF 50-55%    Plan:  - Holding home valsartan and Jardiance iso CABG  - Continue home coreg 12.5 mg BID   - Continue Imdur 30 mg PO Daily  - repeat TTE   - Hydralazine to 50 mg TID     #SERG on CKD  #CKD Stage 3b  -Unclear etiology  -Baseline on chart review: 1.5-2  -eGFR this admission: 30  -Suspect SERG 2/2 recent contrast for [facility]    Plan:  -Continue to trend RFP  -Avoid nephrotoxic agents as possible  -Strict I/O's    #DM T2  HgA1c: 6.5% 3/19    Plan:  -SSI while inpatient     #Hepatitis C - cured  #Preivous Liver Cirrhosis  -S/p Mavyret in 2018 with sustained virologic response.   -Previous concern for stage 2 fibrosis on fibroscan. Repeat ordered by GI ([facility]) in 2024, results unclear.   -Hepatic panel WNL on admission    Plan:  -Continue to monitor    Disposition  -Barriers: Pending CABG  -Destination: Home  -Follow-up: Cardiology, PCP, Cardiac Surgery    Diet: Cardiac  Electrolytes: K >4; Mg >2  DVT ppx: Heparin SQ  GI ppx: Not indicated   Lines/tubes: PIV  O2: RA  Drips: None  Baseline Cr: 1.5-2  Code status: Full  Surrogate decision maker: Mary (daughter) [phone]     Carol Meyer MD  IM PGY-1

## 2025-03-24 ENCOUNTER — APPOINTMENT (OUTPATIENT)
Dept: CARDIOLOGY | Facility: HOSPITAL | Age: 65
DRG: 234 | End: 2025-03-24
Payer: COMMERCIAL

## 2025-03-24 LAB
ABO GROUP (TYPE) IN BLOOD: NORMAL
ALBUMIN SERPL BCP-MCNC: 4 G/DL (ref 3.4–5)
ANION GAP SERPL CALC-SCNC: 14 MMOL/L (ref 10–20)
ANTIBODY SCREEN: NORMAL
BASOPHILS # BLD AUTO: 0.01 X10*3/UL (ref 0–0.1)
BASOPHILS NFR BLD AUTO: 0.2 %
BUN SERPL-MCNC: 29 MG/DL (ref 6–23)
CALCIUM SERPL-MCNC: 10.1 MG/DL (ref 8.6–10.6)
CHLORIDE SERPL-SCNC: 108 MMOL/L (ref 98–107)
CO2 SERPL-SCNC: 23 MMOL/L (ref 21–32)
CREAT SERPL-MCNC: 1.92 MG/DL (ref 0.5–1.05)
EGFRCR SERPLBLD CKD-EPI 2021: 28 ML/MIN/1.73M*2
EOSINOPHIL # BLD AUTO: 0.07 X10*3/UL (ref 0–0.7)
EOSINOPHIL NFR BLD AUTO: 1.5 %
ERYTHROCYTE [DISTWIDTH] IN BLOOD BY AUTOMATED COUNT: 14.2 % (ref 11.5–14.5)
GLUCOSE BLD MANUAL STRIP-MCNC: 102 MG/DL (ref 74–99)
GLUCOSE BLD MANUAL STRIP-MCNC: 107 MG/DL (ref 74–99)
GLUCOSE BLD MANUAL STRIP-MCNC: 165 MG/DL (ref 74–99)
GLUCOSE BLD MANUAL STRIP-MCNC: 259 MG/DL (ref 74–99)
GLUCOSE SERPL-MCNC: 102 MG/DL (ref 74–99)
HCT VFR BLD AUTO: 34.1 % (ref 36–46)
HGB BLD-MCNC: 10.6 G/DL (ref 12–16)
IMM GRANULOCYTES # BLD AUTO: 0.01 X10*3/UL (ref 0–0.7)
IMM GRANULOCYTES NFR BLD AUTO: 0.2 % (ref 0–0.9)
LYMPHOCYTES # BLD AUTO: 1.95 X10*3/UL (ref 1.2–4.8)
LYMPHOCYTES NFR BLD AUTO: 41.4 %
MAGNESIUM SERPL-MCNC: 2.42 MG/DL (ref 1.6–2.4)
MCH RBC QN AUTO: 30.5 PG (ref 26–34)
MCHC RBC AUTO-ENTMCNC: 31.1 G/DL (ref 32–36)
MCV RBC AUTO: 98 FL (ref 80–100)
MONOCYTES # BLD AUTO: 0.46 X10*3/UL (ref 0.1–1)
MONOCYTES NFR BLD AUTO: 9.8 %
NEUTROPHILS # BLD AUTO: 2.21 X10*3/UL (ref 1.2–7.7)
NEUTROPHILS NFR BLD AUTO: 46.9 %
NRBC BLD-RTO: 0 /100 WBCS (ref 0–0)
PHOSPHATE SERPL-MCNC: 3 MG/DL (ref 2.5–4.9)
PLATELET # BLD AUTO: 187 X10*3/UL (ref 150–450)
POTASSIUM SERPL-SCNC: 4.4 MMOL/L (ref 3.5–5.3)
RBC # BLD AUTO: 3.47 X10*6/UL (ref 4–5.2)
RH FACTOR (ANTIGEN D): NORMAL
SODIUM SERPL-SCNC: 141 MMOL/L (ref 136–145)
WBC # BLD AUTO: 4.7 X10*3/UL (ref 4.4–11.3)

## 2025-03-24 PROCEDURE — 2500000001 HC RX 250 WO HCPCS SELF ADMINISTERED DRUGS (ALT 637 FOR MEDICARE OP)

## 2025-03-24 PROCEDURE — 99232 SBSQ HOSP IP/OBS MODERATE 35: CPT | Performed by: STUDENT IN AN ORGANIZED HEALTH CARE EDUCATION/TRAINING PROGRAM

## 2025-03-24 PROCEDURE — 36415 COLL VENOUS BLD VENIPUNCTURE: CPT | Performed by: PHYSICIAN ASSISTANT

## 2025-03-24 PROCEDURE — 82947 ASSAY GLUCOSE BLOOD QUANT: CPT

## 2025-03-24 PROCEDURE — 36415 COLL VENOUS BLD VENIPUNCTURE: CPT

## 2025-03-24 PROCEDURE — 1200000002 HC GENERAL ROOM WITH TELEMETRY DAILY

## 2025-03-24 PROCEDURE — 2500000004 HC RX 250 GENERAL PHARMACY W/ HCPCS (ALT 636 FOR OP/ED)

## 2025-03-24 PROCEDURE — 86923 COMPATIBILITY TEST ELECTRIC: CPT

## 2025-03-24 PROCEDURE — 87081 CULTURE SCREEN ONLY: CPT | Performed by: PHYSICIAN ASSISTANT

## 2025-03-24 PROCEDURE — 93010 ELECTROCARDIOGRAM REPORT: CPT | Performed by: INTERNAL MEDICINE

## 2025-03-24 PROCEDURE — 83735 ASSAY OF MAGNESIUM: CPT

## 2025-03-24 PROCEDURE — 99232 SBSQ HOSP IP/OBS MODERATE 35: CPT | Performed by: PHYSICIAN ASSISTANT

## 2025-03-24 PROCEDURE — 93005 ELECTROCARDIOGRAM TRACING: CPT

## 2025-03-24 PROCEDURE — 86901 BLOOD TYPING SEROLOGIC RH(D): CPT | Performed by: STUDENT IN AN ORGANIZED HEALTH CARE EDUCATION/TRAINING PROGRAM

## 2025-03-24 PROCEDURE — 80069 RENAL FUNCTION PANEL: CPT

## 2025-03-24 PROCEDURE — 85025 COMPLETE CBC W/AUTO DIFF WBC: CPT

## 2025-03-24 PROCEDURE — 2500000005 HC RX 250 GENERAL PHARMACY W/O HCPCS: Performed by: PHYSICIAN ASSISTANT

## 2025-03-24 PROCEDURE — 2500000002 HC RX 250 W HCPCS SELF ADMINISTERED DRUGS (ALT 637 FOR MEDICARE OP, ALT 636 FOR OP/ED)

## 2025-03-24 RX ORDER — CHLORHEXIDINE GLUCONATE ORAL RINSE 1.2 MG/ML
15 SOLUTION DENTAL 2 TIMES DAILY
Status: COMPLETED | OUTPATIENT
Start: 2025-03-25 | End: 2025-03-25

## 2025-03-24 RX ORDER — CARVEDILOL 25 MG/1
25 TABLET ORAL 2 TIMES DAILY
Status: DISCONTINUED | OUTPATIENT
Start: 2025-03-24 | End: 2025-03-26

## 2025-03-24 RX ORDER — HYDRALAZINE HYDROCHLORIDE 50 MG/1
50 TABLET, FILM COATED ORAL 3 TIMES DAILY
Status: DISCONTINUED | OUTPATIENT
Start: 2025-03-24 | End: 2025-03-26

## 2025-03-24 RX ORDER — ACETAMINOPHEN 500 MG
5 TABLET ORAL NIGHTLY PRN
Status: DISCONTINUED | OUTPATIENT
Start: 2025-03-24 | End: 2025-03-26

## 2025-03-24 RX ORDER — MUPIROCIN 20 MG/G
0.5 OINTMENT TOPICAL 2 TIMES DAILY
Status: DISCONTINUED | OUTPATIENT
Start: 2025-03-24 | End: 2025-03-26 | Stop reason: HOSPADM

## 2025-03-24 RX ADMIN — ISOSORBIDE MONONITRATE 30 MG: 30 TABLET, EXTENDED RELEASE ORAL at 08:13

## 2025-03-24 RX ADMIN — CHOLECALCIFEROL TAB 25 MCG (1000 UNIT) 1000 UNITS: 25 TAB at 08:13

## 2025-03-24 RX ADMIN — HEPARIN SODIUM 5000 UNITS: 5000 INJECTION, SOLUTION INTRAVENOUS; SUBCUTANEOUS at 21:34

## 2025-03-24 RX ADMIN — HEPARIN SODIUM 5000 UNITS: 5000 INJECTION, SOLUTION INTRAVENOUS; SUBCUTANEOUS at 12:18

## 2025-03-24 RX ADMIN — MUPIROCIN 0.5 APPLICATION: 20 OINTMENT TOPICAL at 12:31

## 2025-03-24 RX ADMIN — ATORVASTATIN CALCIUM 40 MG: 40 TABLET, FILM COATED ORAL at 21:34

## 2025-03-24 RX ADMIN — MUPIROCIN 0.5 APPLICATION: 20 OINTMENT TOPICAL at 21:34

## 2025-03-24 RX ADMIN — ASPIRIN 81 MG: 81 TABLET, COATED ORAL at 08:13

## 2025-03-24 RX ADMIN — HYDRALAZINE HYDROCHLORIDE 50 MG: 50 TABLET ORAL at 15:01

## 2025-03-24 RX ADMIN — POLYETHYLENE GLYCOL 3350 17 G: 17 POWDER, FOR SOLUTION ORAL at 08:13

## 2025-03-24 RX ADMIN — INSULIN LISPRO 1 UNITS: 100 INJECTION, SOLUTION INTRAVENOUS; SUBCUTANEOUS at 12:19

## 2025-03-24 RX ADMIN — CARVEDILOL 12.5 MG: 12.5 TABLET, FILM COATED ORAL at 08:13

## 2025-03-24 RX ADMIN — HEPARIN SODIUM 5000 UNITS: 5000 INJECTION, SOLUTION INTRAVENOUS; SUBCUTANEOUS at 04:53

## 2025-03-24 RX ADMIN — CARVEDILOL 25 MG: 25 TABLET, FILM COATED ORAL at 21:34

## 2025-03-24 RX ADMIN — HYDRALAZINE HYDROCHLORIDE 50 MG: 50 TABLET ORAL at 21:34

## 2025-03-24 RX ADMIN — HYDRALAZINE HYDROCHLORIDE 50 MG: 50 TABLET ORAL at 08:13

## 2025-03-24 ASSESSMENT — PAIN - FUNCTIONAL ASSESSMENT
PAIN_FUNCTIONAL_ASSESSMENT: 0-10

## 2025-03-24 ASSESSMENT — COGNITIVE AND FUNCTIONAL STATUS - GENERAL
DAILY ACTIVITIY SCORE: 24
MOBILITY SCORE: 24
MOBILITY SCORE: 24
DAILY ACTIVITIY SCORE: 24

## 2025-03-24 ASSESSMENT — PAIN SCALES - GENERAL
PAINLEVEL_OUTOF10: 0 - NO PAIN

## 2025-03-24 NOTE — PROGRESS NOTES
TCC aware that patient is not medically cleared for discharge, pending CABG evaluation. Patient will continue to be discussed in multi-disciplinary rounds and monitored daily.  If any of the the above changes, TCC/SW will complete appropriate assessments and interventions.       BRADEN Monte, RN  [facility], [facility]  Transitional Care Coordinator, Mon-Fri  Cell: [phone], Office: [phone]  Email: [email or web address]

## 2025-03-24 NOTE — PROGRESS NOTES
Music Therapy Note        Therapy Session  Referral Type: New referral this admission  Visit Type: New visit  Session Start Time: 1430  Session End Time: 1435  Intervention Delivery: In-person  Conflict of Service: None  Family Present for Session: None  Number of staff members present: 0     Pre-assessment  Mood/Affect: Appropriate  Verbalized Emotional State: Anxiety         Treatment/Interventions  Music Therapy Interventions: Assessment    Post-assessment  Unable to Assess Reason: Did not provide expressive therapy intervention  Total Session Time (min): 5 minutes    Narrative  Assessment Detail: Patient found MT and MTI on the unit. She asked about music therapy. Receptive to education and benefits of music therapy services. Enjoys all music (mentioned Orthodoxy and gospel). Her brother plays bass in bands. She is having surgery Wed and has some anxiety arount the procedure. Requested music therapy.  Follow-up: MT will follow up with patient accordingly    Education Documentation  No documentation found.

## 2025-03-24 NOTE — CARE PLAN
Problem: Safety - Adult  Goal: Free from fall injury  Outcome: Progressing     The patient's goals for the shift include  ambulation in halls.    The clinical goals for the shift include safety.    Over the shift, the patient did not make progress toward the following goals n/a. Barriers to progression include none. Recommendations to address these barriers include none.

## 2025-03-24 NOTE — NURSING NOTE
Phlebotomy did not draw sufficient quantity for typ&scr. Will need redraw.  Nette Khan notified for new order.    Type/scr redrawn and sent by this RN.

## 2025-03-24 NOTE — PROGRESS NOTES
Subjective    No acute overnight events. Patient did not have any acute concerns this morning. She reported no dyspnea or chest pain when ambulating to the restroom.      Objective     Vital signs:  Temp:  [36 °C (96.8 °F)-36.7 °C (98.1 °F)] 36.2 °C (97.2 °F)  Heart Rate:  [71-88] 83  Resp:  [18-19] 19  BP: (107-173)/(53-84) 145/69     Recent Labs:  Results from last 72 hours   Lab Units 03/23/25  0704 03/22/25  0653   SODIUM mmol/L 140 141   POTASSIUM mmol/L 4.5 4.5   CHLORIDE mmol/L 109* 109*   CO2 mmol/L 23 25   BUN mg/dL 35* 28*   CREATININE mg/dL 2.49* 1.95*   GLUCOSE mg/dL 103* 98   CALCIUM mg/dL 9.6 9.5   ANION GAP mmol/L 13 12   EGFR mL/min/1.73m*2 21* 28*   PHOSPHORUS mg/dL 3.3 3.1      Results from last 72 hours   Lab Units 03/23/25  0704 03/22/25  0653   WBC AUTO x10*3/uL 6.0 4.5   HEMOGLOBIN g/dL 9.8* 9.9*   HEMATOCRIT % 31.2* 30.5*   PLATELETS AUTO x10*3/uL 173 167   NEUTROS PCT AUTO % 42.6 40.5   LYMPHS PCT AUTO % 44.4 47.8   MONOS PCT AUTO % 11.4 10.0   EOS PCT AUTO % 1.2 1.3               Recent Imaging:  Transthoracic Echo (TTE) Complete  Result Date: 3/20/2025    CONCLUSIONS:   1. Left ventricular ejection fraction is low normal, by visual estimate at 50-55%.   2. Spectral Doppler shows a Grade I (impaired relaxation pattern) of left ventricular diastolic filling with normal left atrial filling pressure.   3. There is normal right ventricular global systolic function.   4. Right ventricular systolic pressure is within normal limits.    ECG 12 Lead  Result Date: 3/20/2025  Normal sinus rhythm Possible Left atrial enlargement Septal infarct , age undetermined ST & T wave abnormality, consider inferolateral ischemia Abnormal ECG No previous ECGs available    Physical Exam  Constitutional:       Appearance: Normal appearance.   HENT:      Head: Normocephalic and atraumatic.      Mouth/Throat:      Mouth: Mucous membranes are moist.      Pharynx: Oropharynx is clear.   Eyes:      Extraocular Movements:  Extraocular movements intact.      Conjunctiva/sclera: Conjunctivae normal.      Pupils: Pupils are equal, round, and reactive to light.   Cardiovascular:      Rate and Rhythm: Normal rate and regular rhythm.      Pulses: Normal pulses.      Heart sounds: Normal heart sounds.   Pulmonary:      Effort: Pulmonary effort is normal. No respiratory distress.      Breath sounds: Normal breath sounds. No wheezing.   Abdominal:      General: Bowel sounds are normal. There is no distension.      Palpations: Abdomen is soft.   Musculoskeletal:      Comments: No peripheral edema   Skin:     General: Skin is warm and dry.      Coloration: Skin is not jaundiced.   Neurological:      General: No focal deficit present.      Mental Status: She is alert and oriented to person, place, and time.   Psychiatric:         Mood and Affect: Mood normal.         Behavior: Behavior normal.         Assessment and Plan:  Ms Macias is a 64 F with a PMHx HFrEF (EF 45% -> 50-55%), CAD, DM2, Hep C s/p treatment, CKD, HLD who presents as direct admission for CABG evaluation. Planning for CABG next week, likely 3/26.     Updates 3/24/25:   - Cardiac surgery following, will remain admitted for CABG on Wednesday most likely   - Increased Coreg to 25 mg BID    #Triple vessel CAD   #CABG evaluation   -LHC in 10/23: 3 vessel disease (mid LAD 85% + 90%; Diag#1 75%, midCirc 65%, midRCA 80%)   -Repeat LHC 3/21/25: prox LAD 85%, prox 1st diag 80%, mid circ 75%, OM1 90%, mid RCA 70%  -TTE 07/23: EF 45%  -Repeat TTE 3/20: EF 50-55%  -Lipid panel: Total cholesterol: 165, LDL 85, HDL 43.7, Triglycerides 183  -CABG previously recommended, patient declined.   -Evaluated by outaptient cardiology, directly admitted for CABG evaluation. Cardiac surgery reviewing case.  Plan:  -Cardiac surgery consulted, appreciate recommendations  -Atorvastatin 40 mg PO daily  -Aspirin 81 mg PO daily  -Increased Coreg to 25 mg BID    #HFmrEF  #Ischemic Cardiomyopathy  -TTE 07/23: EF  45%  -Repeat TTE 3/20: EF 50-55%  Plan:  -Holding home valsartan and Jardiance iso CABG  -Increased Coreg to 25 mg BID  -Continue Imdur 30 mg PO Daily  -Hydralazine to 50 mg TID     #SERG on CKD  #CKD Stage 3b  -Unclear etiology  -Baseline on chart review: 1.5-2  -eGFR this admission: 30  -Suspect SERG 2/2 recent contrast for C  Plan:  -Continue to trend RFP  -Avoid nephrotoxic agents as possible  -Strict I/O's    #DM T2  HgA1c: 6.5% 3/19  Plan:  -SSI while inpatient     #Hepatitis C - cured  #Previous Liver Cirrhosis  -S/p Mavyret in 2018 with sustained virologic response.   -Previous concern for stage 2 fibrosis on fibroscan. Repeat ordered by GI ([facility]) in 2024, results unclear.   -Hepatic panel WNL on admission  Plan:  -Continue to monitor    Disposition  -Barriers: Pending CABG  -Destination: Home  -Follow-up: Cardiology, PCP, Cardiac Surgery    Diet: Cardiac  Electrolytes: K >4; Mg >2  DVT ppx: Heparin SQ  GI ppx: Not indicated   Lines/tubes: PIV  O2: RA  Drips: None  Baseline Cr: 1.5-2  Code status: Full  Surrogate decision maker: Mary (daughter) [phone]

## 2025-03-24 NOTE — PROGRESS NOTES
"CARDIAC SURGERY CONSULT PROGRESS NOTE    SUBJECTIVE  Ms. Cornelia Macias is a 64 y.o. female history notable for CHF, HLD, DM2, CAD, depression, Hepatitis C tx Mavyret 2018 with SVR (hepatitis C is cured, with last RNA in 2021 undetectable), who presented at as a direct admission for a CABG evaluation.      The patient was found to have coronary artery disease back in 10/23 (mid LAD 85% + 90%; Diag#1 75%, midCirc 65%, midRCA 80%) planned for CABG then, but patient opted for medical management. Now, the patient states she has been more fatigud than normal. Denies chest pain, SOB, LH, dizziness, palpitations, syncope, n/v, and abdominal pain. Follows Dr. Amador, cardiologist at .      Cardiac/Pertinent Imaging  CONCLUSIONS: TTE 3/20/25   1. Left ventricular ejection fraction is low normal, by visual estimate at 50-55%.   2. Spectral Doppler shows a Grade I (impaired relaxation pattern) of left ventricular diastolic filling with normal left atrial filling pressure.   3. There is normal right ventricular global systolic function.   4. Right ventricular systolic pressure is within normal limits.       Objective   BP (!) 103/49   Pulse 75   Temp 36.4 °C (97.5 °F)   Resp 18   Ht 1.575 m (5' 2\")   Wt 58.4 kg (128 lb 12 oz)   SpO2 98%   BMI 23.55 kg/m²   0-10 (Numeric) Pain Score: 0 - No pain   Vitals:    03/24/25 0453   Weight: 58.4 kg (128 lb 12 oz)          Intake/Output Summary (Last 24 hours) at 3/24/2025 1458  Last data filed at 3/24/2025 1400  Gross per 24 hour   Intake 960 ml   Output 500 ml   Net 460 ml       Physical Exam  Physical Exam  Constitutional:       General: She is not in acute distress.     Appearance: She is not ill-appearing or toxic-appearing.   HENT:      Head: Normocephalic.      Mouth/Throat:      Mouth: Mucous membranes are moist.   Cardiovascular:      Rate and Rhythm: Normal rate and regular rhythm.      Heart sounds: No murmur heard.  Pulmonary:      Effort: Pulmonary effort is " normal.      Comments: On room air  Musculoskeletal:         General: Normal range of motion.      Cervical back: Neck supple.      Right lower leg: No edema.      Left lower leg: No edema.   Skin:     General: Skin is warm and dry.   Neurological:      General: No focal deficit present.      Mental Status: She is alert and oriented to person, place, and time.   Psychiatric:         Mood and Affect: Mood normal.         Behavior: Behavior normal.         Medications  Scheduled medications  aspirin, 81 mg, oral, Daily  atorvastatin, 40 mg, oral, Nightly  carvedilol, 25 mg, oral, BID  [START ON 3/25/2025] chlorhexidine, 15 mL, Mouth/Throat, BID  cholecalciferol, 1,000 Units, oral, Daily  heparin (porcine), 5,000 Units, subcutaneous, q8h  hydrALAZINE, 50 mg, oral, TID  insulin lispro, 0-5 Units, subcutaneous, TID AC  isosorbide mononitrate ER, 30 mg, oral, Daily  mupirocin, 0.5 Application, Topical, BID  perflutren lipid microspheres, 0.5-10 mL of dilution, intravenous, Once in imaging  polyethylene glycol, 17 g, oral, Daily    Continuous medications   PRN medications  PRN medications: acetaminophen, dextrose, dextrose, glucagon, glucagon, melatonin    Labs  Results for orders placed or performed during the hospital encounter of 03/19/25 (from the past 24 hours)   POCT GLUCOSE   Result Value Ref Range    POCT Glucose 82 74 - 99 mg/dL   POCT GLUCOSE   Result Value Ref Range    POCT Glucose 193 (H) 74 - 99 mg/dL   POCT GLUCOSE   Result Value Ref Range    POCT Glucose 102 (H) 74 - 99 mg/dL   CBC and Auto Differential   Result Value Ref Range    WBC 4.7 4.4 - 11.3 x10*3/uL    nRBC 0.0 0.0 - 0.0 /100 WBCs    RBC 3.47 (L) 4.00 - 5.20 x10*6/uL    Hemoglobin 10.6 (L) 12.0 - 16.0 g/dL    Hematocrit 34.1 (L) 36.0 - 46.0 %    MCV 98 80 - 100 fL    MCH 30.5 26.0 - 34.0 pg    MCHC 31.1 (L) 32.0 - 36.0 g/dL    RDW 14.2 11.5 - 14.5 %    Platelets 187 150 - 450 x10*3/uL    Neutrophils % 46.9 40.0 - 80.0 %    Immature Granulocytes %,  Automated 0.2 0.0 - 0.9 %    Lymphocytes % 41.4 13.0 - 44.0 %    Monocytes % 9.8 2.0 - 10.0 %    Eosinophils % 1.5 0.0 - 6.0 %    Basophils % 0.2 0.0 - 2.0 %    Neutrophils Absolute 2.21 1.20 - 7.70 x10*3/uL    Immature Granulocytes Absolute, Automated 0.01 0.00 - 0.70 x10*3/uL    Lymphocytes Absolute 1.95 1.20 - 4.80 x10*3/uL    Monocytes Absolute 0.46 0.10 - 1.00 x10*3/uL    Eosinophils Absolute 0.07 0.00 - 0.70 x10*3/uL    Basophils Absolute 0.01 0.00 - 0.10 x10*3/uL   Magnesium   Result Value Ref Range    Magnesium 2.42 (H) 1.60 - 2.40 mg/dL   Renal function panel   Result Value Ref Range    Glucose 102 (H) 74 - 99 mg/dL    Sodium 141 136 - 145 mmol/L    Potassium 4.4 3.5 - 5.3 mmol/L    Chloride 108 (H) 98 - 107 mmol/L    Bicarbonate 23 21 - 32 mmol/L    Anion Gap 14 10 - 20 mmol/L    Urea Nitrogen 29 (H) 6 - 23 mg/dL    Creatinine 1.92 (H) 0.50 - 1.05 mg/dL    eGFR 28 (L) >60 mL/min/1.73m*2    Calcium 10.1 8.6 - 10.6 mg/dL    Phosphorus 3.0 2.5 - 4.9 mg/dL    Albumin 4.0 3.4 - 5.0 g/dL   POCT GLUCOSE   Result Value Ref Range    POCT Glucose 165 (H) 74 - 99 mg/dL   Electrocardiogram, 12-lead   Result Value Ref Range    Ventricular Rate 82 BPM    Atrial Rate 82 BPM    OH Interval 134 ms    QRS Duration 80 ms    QT Interval 366 ms    QTC Calculation(Bazett) 427 ms    P Axis 71 degrees    R Axis 37 degrees    T Axis -20 degrees    QRS Count 13 beats    Q Onset 223 ms    P Onset 156 ms    P Offset 213 ms    T Offset 406 ms    QTC Fredericia 406 ms               ASSESSMENT & PLAN  Ms. Cornelia Macias is a 64 y.o. female history notable for CHF, HLD, DM2, CAD, depression, Hepatitis C tx Mavyret 2018 with SVR (hepatitis C is cured, with last RNA in 2021 undetectable), who presented at as a direct admission for a CABG evaluation.      The patient was found to have coronary artery disease back in 10/23 (mid LAD 85% + 90%; Diag#1 75%, midCirc 65%, midRCA 80%) planned for CABG then, but patient opted for medical  management. Now, the patient states she has been more fatigud than normal. Denies chest pain, SOB, LH, dizziness, palpitations, syncope, n/v, and abdominal pain. Follows Dr. Amador, cardiologist at .      Cardiac/Pertinent Imaging  CONCLUSIONS: TTE 3/20/25   1. Left ventricular ejection fraction is low normal, by visual estimate at 50-55%.   2. Spectral Doppler shows a Grade I (impaired relaxation pattern) of left ventricular diastolic filling with normal left atrial filling pressure.   3. There is normal right ventricular global systolic function.   4. Right ventricular systolic pressure is within normal limits.      RECOMMENDATIONS/PLAN  Dr. Lopez met with patient and reviewed case and available imaging.   Plan for OR Wed 3/26 for CABG    Preop risk stratification studies/labs to be ordered:   [X] LHC - done 3/21  [X] TTE - EF 50-55%   [ ] US Carotids  [ ] Vein Mapping  [ ] LE US BRANDEN  [ ] PFTs (spirometry and room air ABG)  [ ] 2 view CXR  [ ] MRSA  [ ] UA/Culture, LFTs, HgbA1c, TSH/T4, Lipid panel, CBC, RFP, Coag  [ ] CT chest without contrast     Preop orders when/if goes to surgery:  - Continue ASA, high-intensity statin, and BB (or document contraindications for use) for preop CABG patients   - No ACEi/ARBs in the pre-op period (at least 48 hours)  - Hold any SGLT2 inhibitors (Farxiga, Jardiance, etc.) for at least 3 days prior to cardiac surgery to prevent euglycemic DKA  - Hold any daily GLP-1 agonist drugs on the day of surgery. (Patients on GLP1 agonists should be on clear liquids for 24 hrs, and NPO for 2 hrs prior to surgery.)  - No antiplatelets other than ASA, no anticoagulants other than Heparin  - NPO after midnight, blood on hold/T&S, and preop scrubs ordered for OR 3/26    Will continue to follow along.  Thank you for the consultation.   Patient educated and all questions answered.  Please page the cardiac surgery consult pager [phone] with any questions or changes in patient  condition.    Nette Khan PA-C  Cardiac Surgery Consult [facility]  [facility]  Cardiac Surgery Consult Pager [phone]     3/24/2025  2:58 PM

## 2025-03-25 ENCOUNTER — APPOINTMENT (OUTPATIENT)
Dept: RESPIRATORY THERAPY | Facility: HOSPITAL | Age: 65
End: 2025-03-25
Payer: COMMERCIAL

## 2025-03-25 ENCOUNTER — APPOINTMENT (OUTPATIENT)
Dept: RADIOLOGY | Facility: HOSPITAL | Age: 65
DRG: 234 | End: 2025-03-25
Payer: COMMERCIAL

## 2025-03-25 ENCOUNTER — APPOINTMENT (OUTPATIENT)
Dept: VASCULAR MEDICINE | Facility: HOSPITAL | Age: 65
DRG: 234 | End: 2025-03-25
Payer: COMMERCIAL

## 2025-03-25 ENCOUNTER — ANESTHESIA EVENT (OUTPATIENT)
Dept: OPERATING ROOM | Facility: HOSPITAL | Age: 65
End: 2025-03-25
Payer: COMMERCIAL

## 2025-03-25 PROBLEM — B19.20 HEPATITIS C: Status: ACTIVE | Noted: 2025-03-25

## 2025-03-25 PROBLEM — I50.30 DIASTOLIC CONGESTIVE HEART FAILURE: Status: ACTIVE | Noted: 2025-03-25

## 2025-03-25 PROBLEM — F32.A DEPRESSION: Status: ACTIVE | Noted: 2025-03-25

## 2025-03-25 LAB
ABO GROUP (TYPE) IN BLOOD: NORMAL
ALBUMIN SERPL BCP-MCNC: 3.8 G/DL (ref 3.4–5)
ANION GAP SERPL CALC-SCNC: 12 MMOL/L (ref 10–20)
ATRIAL RATE: 82 BPM
BASE EXCESS BLDA CALC-SCNC: -2.7 MMOL/L (ref -2–3)
BASOPHILS # BLD AUTO: 0.01 X10*3/UL (ref 0–0.1)
BASOPHILS NFR BLD AUTO: 0.2 %
BODY TEMPERATURE: 37 DEGREES CELSIUS
BUN SERPL-MCNC: 32 MG/DL (ref 6–23)
CALCIUM SERPL-MCNC: 9.7 MG/DL (ref 8.6–10.6)
CHLORIDE SERPL-SCNC: 109 MMOL/L (ref 98–107)
CO2 SERPL-SCNC: 23 MMOL/L (ref 21–32)
COHGB MFR BLDA: 1.9 %
CREAT SERPL-MCNC: 2.16 MG/DL (ref 0.5–1.05)
DO-HGB MFR BLDA: 0 % (ref 0–5)
EGFRCR SERPLBLD CKD-EPI 2021: 25 ML/MIN/1.73M*2
EOSINOPHIL # BLD AUTO: 0.08 X10*3/UL (ref 0–0.7)
EOSINOPHIL NFR BLD AUTO: 1.6 %
ERYTHROCYTE [DISTWIDTH] IN BLOOD BY AUTOMATED COUNT: 14.2 % (ref 11.5–14.5)
GLUCOSE BLD MANUAL STRIP-MCNC: 104 MG/DL (ref 74–99)
GLUCOSE BLD MANUAL STRIP-MCNC: 111 MG/DL (ref 74–99)
GLUCOSE BLD MANUAL STRIP-MCNC: 187 MG/DL (ref 74–99)
GLUCOSE SERPL-MCNC: 102 MG/DL (ref 74–99)
HCO3 BLDA-SCNC: 21.7 MMOL/L (ref 22–26)
HCT VFR BLD AUTO: 30.3 % (ref 36–46)
HGB BLD-MCNC: 9.7 G/DL (ref 12–16)
HGB BLDA-MCNC: 10.1 G/DL (ref 12–16)
IMM GRANULOCYTES # BLD AUTO: 0.01 X10*3/UL (ref 0–0.7)
IMM GRANULOCYTES NFR BLD AUTO: 0.2 % (ref 0–0.9)
LYMPHOCYTES # BLD AUTO: 2.54 X10*3/UL (ref 1.2–4.8)
LYMPHOCYTES NFR BLD AUTO: 51.1 %
MAGNESIUM SERPL-MCNC: 2.38 MG/DL (ref 1.6–2.4)
MCH RBC QN AUTO: 30.2 PG (ref 26–34)
MCHC RBC AUTO-ENTMCNC: 32 G/DL (ref 32–36)
MCV RBC AUTO: 94 FL (ref 80–100)
METHGB MFR BLDA: 1.1 % (ref 0–1.5)
MGC ASCENT PFT - FEV1 - PRE: 2.09
MGC ASCENT PFT - FEV1 - PREDICTED: 2.06
MGC ASCENT PFT - FVC - PRE: 2.81
MGC ASCENT PFT - FVC - PREDICTED: 2.6
MONOCYTES # BLD AUTO: 0.52 X10*3/UL (ref 0.1–1)
MONOCYTES NFR BLD AUTO: 10.5 %
NEUTROPHILS # BLD AUTO: 1.81 X10*3/UL (ref 1.2–7.7)
NEUTROPHILS NFR BLD AUTO: 36.4 %
NRBC BLD-RTO: 0 /100 WBCS (ref 0–0)
OXYHGB MFR BLDA: 97 % (ref 94–98)
P AXIS: 71 DEGREES
P OFFSET: 213 MS
P ONSET: 156 MS
PCO2 BLDA: 35 MM HG (ref 38–42)
PH BLDA: 7.4 PH (ref 7.38–7.42)
PHOSPHATE SERPL-MCNC: 3.3 MG/DL (ref 2.5–4.9)
PLATELET # BLD AUTO: 175 X10*3/UL (ref 150–450)
PO2 BLDA: 117 MM HG (ref 85–95)
POTASSIUM SERPL-SCNC: 4.3 MMOL/L (ref 3.5–5.3)
PR INTERVAL: 134 MS
Q ONSET: 223 MS
QRS COUNT: 13 BEATS
QRS DURATION: 80 MS
QT INTERVAL: 366 MS
QTC CALCULATION(BAZETT): 427 MS
QTC FREDERICIA: 406 MS
R AXIS: 37 DEGREES
RBC # BLD AUTO: 3.21 X10*6/UL (ref 4–5.2)
RH FACTOR (ANTIGEN D): NORMAL
SAO2 % BLDA: 100 % (ref 94–100)
SODIUM SERPL-SCNC: 140 MMOL/L (ref 136–145)
STAPHYLOCOCCUS SPEC CULT: NORMAL
T AXIS: -20 DEGREES
T OFFSET: 406 MS
VENTRICULAR RATE: 82 BPM
WBC # BLD AUTO: 5 X10*3/UL (ref 4.4–11.3)

## 2025-03-25 PROCEDURE — 99232 SBSQ HOSP IP/OBS MODERATE 35: CPT | Performed by: STUDENT IN AN ORGANIZED HEALTH CARE EDUCATION/TRAINING PROGRAM

## 2025-03-25 PROCEDURE — 2500000001 HC RX 250 WO HCPCS SELF ADMINISTERED DRUGS (ALT 637 FOR MEDICARE OP)

## 2025-03-25 PROCEDURE — 71250 CT THORAX DX C-: CPT

## 2025-03-25 PROCEDURE — 94010 BREATHING CAPACITY TEST: CPT | Performed by: STUDENT IN AN ORGANIZED HEALTH CARE EDUCATION/TRAINING PROGRAM

## 2025-03-25 PROCEDURE — 36600 WITHDRAWAL OF ARTERIAL BLOOD: CPT

## 2025-03-25 PROCEDURE — 93970 EXTREMITY STUDY: CPT | Performed by: SURGERY

## 2025-03-25 PROCEDURE — 1200000002 HC GENERAL ROOM WITH TELEMETRY DAILY

## 2025-03-25 PROCEDURE — 80069 RENAL FUNCTION PANEL: CPT | Performed by: STUDENT IN AN ORGANIZED HEALTH CARE EDUCATION/TRAINING PROGRAM

## 2025-03-25 PROCEDURE — 93922 UPR/L XTREMITY ART 2 LEVELS: CPT

## 2025-03-25 PROCEDURE — 93880 EXTRACRANIAL BILAT STUDY: CPT

## 2025-03-25 PROCEDURE — 71046 X-RAY EXAM CHEST 2 VIEWS: CPT | Performed by: RADIOLOGY

## 2025-03-25 PROCEDURE — 71046 X-RAY EXAM CHEST 2 VIEWS: CPT

## 2025-03-25 PROCEDURE — 85025 COMPLETE CBC W/AUTO DIFF WBC: CPT | Performed by: STUDENT IN AN ORGANIZED HEALTH CARE EDUCATION/TRAINING PROGRAM

## 2025-03-25 PROCEDURE — 2500000004 HC RX 250 GENERAL PHARMACY W/ HCPCS (ALT 636 FOR OP/ED)

## 2025-03-25 PROCEDURE — 99232 SBSQ HOSP IP/OBS MODERATE 35: CPT | Performed by: PHYSICIAN ASSISTANT

## 2025-03-25 PROCEDURE — 82947 ASSAY GLUCOSE BLOOD QUANT: CPT

## 2025-03-25 PROCEDURE — 93970 EXTREMITY STUDY: CPT

## 2025-03-25 PROCEDURE — 2500000001 HC RX 250 WO HCPCS SELF ADMINISTERED DRUGS (ALT 637 FOR MEDICARE OP): Performed by: PHYSICIAN ASSISTANT

## 2025-03-25 PROCEDURE — 82805 BLOOD GASES W/O2 SATURATION: CPT

## 2025-03-25 PROCEDURE — 36415 COLL VENOUS BLD VENIPUNCTURE: CPT | Performed by: PHYSICIAN ASSISTANT

## 2025-03-25 PROCEDURE — 93880 EXTRACRANIAL BILAT STUDY: CPT | Performed by: SURGERY

## 2025-03-25 PROCEDURE — 94010 BREATHING CAPACITY TEST: CPT

## 2025-03-25 PROCEDURE — 83735 ASSAY OF MAGNESIUM: CPT | Performed by: STUDENT IN AN ORGANIZED HEALTH CARE EDUCATION/TRAINING PROGRAM

## 2025-03-25 PROCEDURE — 93922 UPR/L XTREMITY ART 2 LEVELS: CPT | Performed by: SURGERY

## 2025-03-25 RX ADMIN — CARVEDILOL 25 MG: 25 TABLET, FILM COATED ORAL at 20:42

## 2025-03-25 RX ADMIN — MUPIROCIN 0.5 APPLICATION: 20 OINTMENT TOPICAL at 08:33

## 2025-03-25 RX ADMIN — CARVEDILOL 25 MG: 25 TABLET, FILM COATED ORAL at 08:33

## 2025-03-25 RX ADMIN — HYDRALAZINE HYDROCHLORIDE 50 MG: 50 TABLET ORAL at 08:33

## 2025-03-25 RX ADMIN — HEPARIN SODIUM 5000 UNITS: 5000 INJECTION, SOLUTION INTRAVENOUS; SUBCUTANEOUS at 04:20

## 2025-03-25 RX ADMIN — HYDRALAZINE HYDROCHLORIDE 50 MG: 50 TABLET ORAL at 15:59

## 2025-03-25 RX ADMIN — HYDRALAZINE HYDROCHLORIDE 50 MG: 50 TABLET ORAL at 20:42

## 2025-03-25 RX ADMIN — HEPARIN SODIUM 5000 UNITS: 5000 INJECTION, SOLUTION INTRAVENOUS; SUBCUTANEOUS at 20:42

## 2025-03-25 RX ADMIN — CHLORHEXIDINE GLUCONATE, 0.12% ORAL RINSE 15 ML: 1.2 SOLUTION DENTAL at 22:10

## 2025-03-25 RX ADMIN — ASPIRIN 81 MG: 81 TABLET, COATED ORAL at 08:33

## 2025-03-25 RX ADMIN — CHOLECALCIFEROL TAB 25 MCG (1000 UNIT) 1000 UNITS: 25 TAB at 08:33

## 2025-03-25 RX ADMIN — ATORVASTATIN CALCIUM 40 MG: 40 TABLET, FILM COATED ORAL at 20:42

## 2025-03-25 RX ADMIN — CHLORHEXIDINE GLUCONATE, 0.12% ORAL RINSE 15 ML: 1.2 SOLUTION DENTAL at 08:33

## 2025-03-25 RX ADMIN — ISOSORBIDE MONONITRATE 30 MG: 30 TABLET, EXTENDED RELEASE ORAL at 08:33

## 2025-03-25 RX ADMIN — MUPIROCIN 0.5 APPLICATION: 20 OINTMENT TOPICAL at 20:42

## 2025-03-25 ASSESSMENT — COGNITIVE AND FUNCTIONAL STATUS - GENERAL
MOBILITY SCORE: 24
DAILY ACTIVITIY SCORE: 24
MOBILITY SCORE: 24
DAILY ACTIVITIY SCORE: 24

## 2025-03-25 ASSESSMENT — PAIN - FUNCTIONAL ASSESSMENT: PAIN_FUNCTIONAL_ASSESSMENT: 0-10

## 2025-03-25 ASSESSMENT — PAIN SCALES - GENERAL
PAINLEVEL_OUTOF10: 0 - NO PAIN
PAINLEVEL_OUTOF10: 0 - NO PAIN

## 2025-03-25 NOTE — PROGRESS NOTES
Subjective    No acute overnight events. Patient did not have any acute concerns this morning.      Objective     Vital signs:  Temp:  [36.7 °C (98.1 °F)-37 °C (98.6 °F)] 36.9 °C (98.4 °F)  Heart Rate:  [73-90] 73  Resp:  [16-18] 16  BP: (111-133)/(56-69) 130/67     Recent Labs:  Results from last 72 hours   Lab Units 03/25/25  0720 03/24/25  0747 03/23/25  0704   SODIUM mmol/L 140 141 140   POTASSIUM mmol/L 4.3 4.4 4.5   CHLORIDE mmol/L 109* 108* 109*   CO2 mmol/L 23 23 23   BUN mg/dL 32* 29* 35*   CREATININE mg/dL 2.16* 1.92* 2.49*   GLUCOSE mg/dL 102* 102* 103*   CALCIUM mg/dL 9.7 10.1 9.6   ANION GAP mmol/L 12 14 13   EGFR mL/min/1.73m*2 25* 28* 21*   PHOSPHORUS mg/dL 3.3 3.0 3.3      Results from last 72 hours   Lab Units 03/25/25  0720 03/24/25  0747 03/23/25  0704   WBC AUTO x10*3/uL 5.0 4.7 6.0   HEMOGLOBIN g/dL 9.7* 10.6* 9.8*   HEMATOCRIT % 30.3* 34.1* 31.2*   PLATELETS AUTO x10*3/uL 175 187 173   NEUTROS PCT AUTO % 36.4 46.9 42.6   LYMPHS PCT AUTO % 51.1 41.4 44.4   MONOS PCT AUTO % 10.5 9.8 11.4   EOS PCT AUTO % 1.6 1.5 1.2      Results from last 72 hours   Lab Units 03/25/25  1000   POCT PH, ARTERIAL pH 7.40   POCT PCO2, ARTERIAL mm Hg 35*   POCT PO2, ARTERIAL mm Hg 117*   POCT SO2, ARTERIAL % 100          Recent Imaging:  Transthoracic Echo (TTE) Complete  Result Date: 3/20/2025    CONCLUSIONS:   1. Left ventricular ejection fraction is low normal, by visual estimate at 50-55%.   2. Spectral Doppler shows a Grade I (impaired relaxation pattern) of left ventricular diastolic filling with normal left atrial filling pressure.   3. There is normal right ventricular global systolic function.   4. Right ventricular systolic pressure is within normal limits.    ECG 12 Lead  Result Date: 3/20/2025  Normal sinus rhythm Possible Left atrial enlargement Septal infarct , age undetermined ST & T wave abnormality, consider inferolateral ischemia Abnormal ECG No previous ECGs available    Physical  Exam  Constitutional:       Appearance: Normal appearance.   HENT:      Head: Normocephalic and atraumatic.      Mouth/Throat:      Mouth: Mucous membranes are moist.      Pharynx: Oropharynx is clear.   Eyes:      Extraocular Movements: Extraocular movements intact.      Conjunctiva/sclera: Conjunctivae normal.      Pupils: Pupils are equal, round, and reactive to light.   Cardiovascular:      Rate and Rhythm: Normal rate and regular rhythm.      Pulses: Normal pulses.      Heart sounds: Normal heart sounds.   Pulmonary:      Effort: Pulmonary effort is normal. No respiratory distress.      Breath sounds: Normal breath sounds. No wheezing.   Abdominal:      General: Bowel sounds are normal. There is no distension.      Palpations: Abdomen is soft.   Musculoskeletal:      Comments: No peripheral edema   Skin:     General: Skin is warm and dry.      Coloration: Skin is not jaundiced.   Neurological:      General: No focal deficit present.      Mental Status: She is alert and oriented to person, place, and time.   Psychiatric:         Mood and Affect: Mood normal.         Behavior: Behavior normal.         Assessment and Plan:  Ms Macias is a 64 F with a PMHx HFrEF (EF 45% -> 50-55%), CAD, DM2, Hep C s/p treatment, CKD, HLD who presents as direct admission for CABG evaluation. Planning for CABG next week, likely 3/26.     Updates 03/25/25:   - Cardiac surgery following, will remain admitted for CABG tomorrow  - Pending remaining preop studies  - NPO at midnight    #Triple vessel CAD   #CABG evaluation   -[facility] in 10/23: 3 vessel disease (mid LAD 85% + 90%; Diag#1 75%, midCirc 65%, midRCA 80%)   -Repeat C 3/21/25: prox LAD 85%, prox 1st diag 80%, mid circ 75%, OM1 90%, mid RCA 70%  -TTE 07/23: EF 45%  -Repeat TTE 3/20/25: EF 50-55%  -Lipid panel: Total cholesterol: 165, LDL 85, HDL 43.7, Triglycerides 183  -CABG previously recommended, patient declined.   -Evaluated by outaptient cardiology, directly admitted for CABG  evaluation. Cardiac surgery reviewing case.  Plan:  -Cardiac surgery consulted, appreciate recommendations  -Atorvastatin 40 mg PO daily  -Aspirin 81 mg PO daily  -Increased Coreg to 25 mg BID    #HFmrEF  #Ischemic Cardiomyopathy  -TTE 07/23: EF 45%  -Repeat TTE 3/20: EF 50-55%  Plan:  -Holding home valsartan and Jardiance iso CABG  -Increased Coreg to 25 mg BID  -Continue Imdur 30 mg PO Daily  -Hydralazine to 50 mg TID     #SERG on CKD  #CKD Stage 3b  -Unclear etiology  -Baseline on chart review: 1.5-2  -eGFR this admission: 30  -Suspect SERG 2/2 recent contrast for [facility]  Plan:  -Continue to trend RFP  -Avoid nephrotoxic agents as possible  -Strict I/O's    #DM T2  -HgA1c: 6.5% 3/19  Plan:  -SSI while inpatient     #Hepatitis C - cured  #Previous Liver Cirrhosis  -S/p Mavyret in 2018 with sustained virologic response.   -Previous concern for stage 2 fibrosis on fibroscan. Repeat ordered by GI (OSH) in 2024, results unclear.   -Hepatic panel WNL on admission  Plan:  -Continue to monitor    Disposition  -Barriers: Pending CABG  -Destination: Home  -Follow-up: Cardiology, PCP, Cardiac Surgery    F: PRN  E: K >4; Mg >2  N: Cardiac  A: PIV (20G, 22G)  DVT ppx: Heparin SQ  GI ppx: Not indicated   Lines/tubes: PIV  O2: RA  Drips: None  Baseline Cr: 1.5-2  Code status: Full  Surrogate decision maker: Mary (daughter) [phone]

## 2025-03-25 NOTE — PROGRESS NOTES
"CARDIAC SURGERY CONSULT PROGRESS NOTE    SUBJECTIVE  Ms. Cornelia Macias is a 64 y.o. female history notable for CHF, HLD, DM2, CAD, depression, Hepatitis C tx Mavyret 2018 with SVR (hepatitis C is cured, with last RNA in 2021 undetectable), who presented at as a direct admission for a CABG evaluation.      The patient was found to have coronary artery disease back in 10/23 (mid LAD 85% + 90%; Diag#1 75%, midCirc 65%, midRCA 80%) planned for CABG then, but patient opted for medical management. Now, the patient states she has been more fatigud than normal. Denies chest pain, SOB, LH, dizziness, palpitations, syncope, n/v, and abdominal pain. Follows Dr. Amador, cardiologist at .      Cardiac/Pertinent Imaging  CONCLUSIONS: TTE 3/20/25   1. Left ventricular ejection fraction is low normal, by visual estimate at 50-55%.   2. Spectral Doppler shows a Grade I (impaired relaxation pattern) of left ventricular diastolic filling with normal left atrial filling pressure.   3. There is normal right ventricular global systolic function.   4. Right ventricular systolic pressure is within normal limits.       Objective   BP 99/59 (BP Location: Left arm, Patient Position: Lying)   Pulse 80   Temp 37.3 °C (99.1 °F) (Temporal)   Resp 16   Ht 1.575 m (5' 2\")   Wt 59 kg (130 lb 1.1 oz)   SpO2 99%   BMI 23.79 kg/m²   0-10 (Numeric) Pain Score: 0 - No pain   Vitals:    03/25/25 0427   Weight: 59 kg (130 lb 1.1 oz)          Intake/Output Summary (Last 24 hours) at 3/25/2025 1339  Last data filed at 3/25/2025 1249  Gross per 24 hour   Intake 960 ml   Output 650 ml   Net 310 ml       Physical Exam  Physical Exam  Constitutional:       General: She is not in acute distress.     Appearance: She is not ill-appearing or toxic-appearing.   HENT:      Head: Normocephalic.      Mouth/Throat:      Mouth: Mucous membranes are moist.   Cardiovascular:      Rate and Rhythm: Normal rate and regular rhythm.      Heart sounds: No murmur " heard.  Pulmonary:      Effort: Pulmonary effort is normal.      Comments: On room air  Musculoskeletal:         General: Normal range of motion.      Cervical back: Neck supple.      Right lower leg: No edema.      Left lower leg: No edema.   Skin:     General: Skin is warm and dry.   Neurological:      General: No focal deficit present.      Mental Status: She is alert and oriented to person, place, and time.   Psychiatric:         Mood and Affect: Mood normal.         Behavior: Behavior normal.         Medications  Scheduled medications  aspirin, 81 mg, oral, Daily  atorvastatin, 40 mg, oral, Nightly  carvedilol, 25 mg, oral, BID  chlorhexidine, 15 mL, Mouth/Throat, BID  cholecalciferol, 1,000 Units, oral, Daily  heparin (porcine), 5,000 Units, subcutaneous, q8h  hydrALAZINE, 50 mg, oral, TID  insulin lispro, 0-5 Units, subcutaneous, TID AC  isosorbide mononitrate ER, 30 mg, oral, Daily  mupirocin, 0.5 Application, Topical, BID  perflutren lipid microspheres, 0.5-10 mL of dilution, intravenous, Once in imaging  polyethylene glycol, 17 g, oral, Daily    Continuous medications   PRN medications  PRN medications: acetaminophen, dextrose, dextrose, glucagon, glucagon, melatonin    Labs  Results for orders placed or performed during the hospital encounter of 03/19/25 (from the past 24 hours)   POCT GLUCOSE   Result Value Ref Range    POCT Glucose 107 (H) 74 - 99 mg/dL   Type and screen   Result Value Ref Range    ABO TYPE A     Rh TYPE POS     ANTIBODY SCREEN NEG    POCT GLUCOSE   Result Value Ref Range    POCT Glucose 259 (H) 74 - 99 mg/dL   CBC and Auto Differential   Result Value Ref Range    WBC 5.0 4.4 - 11.3 x10*3/uL    nRBC 0.0 0.0 - 0.0 /100 WBCs    RBC 3.21 (L) 4.00 - 5.20 x10*6/uL    Hemoglobin 9.7 (L) 12.0 - 16.0 g/dL    Hematocrit 30.3 (L) 36.0 - 46.0 %    MCV 94 80 - 100 fL    MCH 30.2 26.0 - 34.0 pg    MCHC 32.0 32.0 - 36.0 g/dL    RDW 14.2 11.5 - 14.5 %    Platelets 175 150 - 450 x10*3/uL    Neutrophils  % 36.4 40.0 - 80.0 %    Immature Granulocytes %, Automated 0.2 0.0 - 0.9 %    Lymphocytes % 51.1 13.0 - 44.0 %    Monocytes % 10.5 2.0 - 10.0 %    Eosinophils % 1.6 0.0 - 6.0 %    Basophils % 0.2 0.0 - 2.0 %    Neutrophils Absolute 1.81 1.20 - 7.70 x10*3/uL    Immature Granulocytes Absolute, Automated 0.01 0.00 - 0.70 x10*3/uL    Lymphocytes Absolute 2.54 1.20 - 4.80 x10*3/uL    Monocytes Absolute 0.52 0.10 - 1.00 x10*3/uL    Eosinophils Absolute 0.08 0.00 - 0.70 x10*3/uL    Basophils Absolute 0.01 0.00 - 0.10 x10*3/uL   Magnesium   Result Value Ref Range    Magnesium 2.38 1.60 - 2.40 mg/dL   Renal function panel   Result Value Ref Range    Glucose 102 (H) 74 - 99 mg/dL    Sodium 140 136 - 145 mmol/L    Potassium 4.3 3.5 - 5.3 mmol/L    Chloride 109 (H) 98 - 107 mmol/L    Bicarbonate 23 21 - 32 mmol/L    Anion Gap 12 10 - 20 mmol/L    Urea Nitrogen 32 (H) 6 - 23 mg/dL    Creatinine 2.16 (H) 0.50 - 1.05 mg/dL    eGFR 25 (L) >60 mL/min/1.73m*2    Calcium 9.7 8.6 - 10.6 mg/dL    Phosphorus 3.3 2.5 - 4.9 mg/dL    Albumin 3.8 3.4 - 5.0 g/dL   VERIFY ABO/Rh Group Test   Result Value Ref Range    ABO TYPE A     Rh TYPE POS    POCT GLUCOSE   Result Value Ref Range    POCT Glucose 104 (H) 74 - 99 mg/dL   Blood Gas Arterial, COOX Unsolicited   Result Value Ref Range    POCT pH, Arterial 7.40 7.38 - 7.42 pH    POCT pCO2, Arterial 35 (L) 38 - 42 mm Hg    POCT pO2, Arterial 117 (H) 85 - 95 mm Hg    POCT SO2, Arterial 100 94 - 100 %    POCT Oxy Hemoglobin, Arterial 97.0 94.0 - 98.0 %    POCT Base Excess, Arterial -2.7 (L) -2.0 - 3.0 mmol/L    POCT HCO3 Calculated, Arterial 21.7 (L) 22.0 - 26.0 mmol/L    POCT Hemoglobin, Arterial 10.1 (L) 12.0 - 16.0 g/dL    POCT Carboxyhemoglobin, Arterial 1.9 %    POCT Methemoglobin, Arterial 1.1 0.0 - 1.5 %    POCT Deoxy Hemoglobin, Arterial 0.0 0.0 - 5.0 %    Patient Temperature 37.0 degrees Celsius   Pulmonary function testing   Result Value Ref Range    FVC - Predicted 2.60     FEV1 -  Predicted 2.06     FVC - PRE 2.81     FEV1 - Pre 2.09    Vascular US carotid artery duplex bilateral   Result Value Ref Range    BSA 1.61 m2   Vascular US lower extremity vein mapping bilateral   Result Value Ref Range    BSA 1.61 m2               ASSESSMENT & PLAN  Ms. Cornelia Macias is a 64 y.o. female history notable for CHF, HLD, DM2, CAD, depression, Hepatitis C tx Mavyret 2018 with SVR (hepatitis C is cured, with last RNA in 2021 undetectable), who presented at as a direct admission for a CABG evaluation.      The patient was found to have coronary artery disease back in 10/23 (mid LAD 85% + 90%; Diag#1 75%, midCirc 65%, midRCA 80%) planned for CABG then, but patient opted for medical management. Now, the patient states she has been more fatigud than normal. Denies chest pain, SOB, LH, dizziness, palpitations, syncope, n/v, and abdominal pain. Follows Dr. Amador, cardiologist at .      Cardiac/Pertinent Imaging  CONCLUSIONS: TTE 3/20/25   1. Left ventricular ejection fraction is low normal, by visual estimate at 50-55%.   2. Spectral Doppler shows a Grade I (impaired relaxation pattern) of left ventricular diastolic filling with normal left atrial filling pressure.   3. There is normal right ventricular global systolic function.   4. Right ventricular systolic pressure is within normal limits.      RECOMMENDATIONS/PLAN  Dr. Lopez met with patient and reviewed case and available imaging.   Plan for OR Wed 3/26 for CABG    Preop risk stratification studies/labs to be ordered:   [X] LHC - done 3/21  [X] TTE - EF 50-55%   [X] US Carotids - less than 50% stenosis bilaterally   [X] Vein Mapping  [X] LE US BRANDEN  [X] PFTs (spirometry and room air ABG) - WNL  [ ] 2 view CXR  [ ] MRSA  [ ] UA/Culture, LFTs, HgbA1c, TSH/T4, Lipid panel, CBC, RFP, Coag  [ ] CT chest without contrast     Preop orders when/if goes to surgery:  - Continue ASA, high-intensity statin, and BB (or document contraindications for use) for  preop CABG patients   - No ACEi/ARBs in the pre-op period (at least 48 hours)  - Hold any SGLT2 inhibitors (Farxiga, Jardiance, etc.) for at least 3 days prior to cardiac surgery to prevent euglycemic DKA  - Hold any daily GLP-1 agonist drugs on the day of surgery. (Patients on GLP1 agonists should be on clear liquids for 24 hrs, and NPO for 2 hrs prior to surgery.)  - No antiplatelets other than ASA, no anticoagulants other than Heparin  - NPO after midnight, blood on hold/T&S, and preop scrubs ordered for OR 3/26    Will continue to follow along.  Thank you for the consultation.   Patient educated and all questions answered.  Please page the cardiac surgery consult pager [phone] with any questions or changes in patient condition.    Nette Khan PA-C  Cardiac Surgery Consult INGE  [facility]  Cardiac Surgery Consult Pager [phone]     3/25/2025  1:39 PM

## 2025-03-25 NOTE — ANESTHESIA PREPROCEDURE EVALUATION
Patient: Cornelia Macias    Procedure Information       Date/Time: 03/26/25 0750    Procedure: CABG, 3-4 VESSELS - CABG x3 (LIMA to LAD, SVG to PDA, SVG to OM), LAAC    Location: [facility] OR 19 / Virtual [facility] [place] [facility]    Surgeons: Isela Lopez MD            Patient is a 63 yo woman with a PMH of obstructive CAD ([facility] on Oct 2023 showing 3 vessel epicardial coronary disease (mid LAD 85% & 90%); D1 75%, mid LCirc 65%, mid RCA 80%). TTE 7/5/23 showed EF 45%. Was recommended for CABG at that time, but patient declined. Other comorbidities include CKD4, DM, HTN, DLD, active smoker, remote HepC.  Presented reporting exertional dyspnea. Got re-look [facility] 3/22 with similar findings.      Patient otherwise reports no acute symptoms when seen. Labs and imaging otherwise reviewed. Pending CABG.       Relevant Problems   Anesthesia (within normal limits)      Cardiac   (+) CAD, multiple vessel   (+) Coronary atherosclerosis   (+) Diastolic congestive heart failure   (+) Hypertension   (+) Mixed hyperlipidemia      Pulmonary  Longtime smoker 1/2 ppd x 55 years   (+) REJI (obstructive sleep apnea) (Possible undiagnosed REJI -- pt states she sometimes wakes up gasping for breath)   (-) Chronic obstructive pulmonary disease (Multi) (Pt denies)      Neuro   (+) Anxiety   (+) Depression   (+) Peripheral neuropathy (Diabetic neuropathy of legs/feet, mild neuropathy of hands (possibly from C-spine involvement))      GI (within normal limits)   (-) Gastroesophageal reflux disease      /Renal   (+) Chronic renal impairment, stage 4 (severe) (Multi)   (-) Hemodialysis patient (CMS-HCC) (Pt states never had dialysis)      Liver   (+) Hepatitis C (Treated with Mavyret in 2018 with SVR (hep C cured last RNA in 2021 undetectable))      Endocrine   (+) Type 2 diabetes mellitus (Blood glucose in AM of surgery = 109)      Hematology   (+) Anemia (Chronic renal-associated anemia (hgb <10))   (-) History of blood transfusion (Pt denies)       Musculoskeletal   (+) Chronic neck pain (mild)      HEENT (within normal limits)      ID   (+) Hepatitis C (Treated with Mavyret in 2018 with SVR (hep C cured last RNA in 2021 undetectable))      Skin (within normal limits)      GYN (within normal limits)         EKG (3/24/2025):  Normal sinus rhythm  Nonspecific ST and T wave abnormality  Abnormal ECG  When compared with ECG of 20-MAR-2025 16:56,  No significant change was found  Confirmed by Radhames Balderas ([id number]) on 3/25/2025 4:31:23 AM      [facility] 3/21/25 - Coronary Lesion Summary:  Vessel         Stenosis   Vessel Segment  LAD          85% stenosis proximal to mid  1st Diagonal 80% stenosis    proximal  Circumflex   75% stenosis       mid  OM 1         90% stenosis     ostial  RCA          70% stenosis       mid    CONCLUSIONS: TTE 3/20/25   1. Left ventricular ejection fraction is low normal, by visual estimate at 50-55%.   2. Spectral Doppler shows a Grade I (impaired relaxation pattern) of left ventricular diastolic filling with normal left atrial filling pressure.   3. There is normal right ventricular global systolic function.   4. Right ventricular systolic pressure is within normal limits.    Carotid Duplex 3/24/25 PRELIMINARY CONCLUSIONS:  Right Carotid: Findings are consistent with less than 50% stenosis of the right proximal internal carotid artery. Right external carotid artery appears patent with no evidence of stenosis. The right vertebral artery is patent with antegrade flow. No evidence of hemodynamically significant stenosis in the right subclavian artery.  Left Carotid: Findings are consistent with less than 50% stenosis of the left proximal internal carotid artery. Left external carotid artery appears patent with no evidence of stenosis. The left vertebral artery is patent with antegrade flow. No evidence of hemodynamically significant stenosis in the left subclavian artery.    Clinical information reviewed:    Allergies  Meds               NPO  Detail:  No data recorded     Physical Exam    Airway  Mallampati: III  TM distance: >3 FB  Neck ROM: full     Cardiovascular - normal exam  Rhythm: regular  Rate: normal     Dental - normal exam     Pulmonary - normal exam  Breath sounds clear to auscultation     Abdominal - normal exam  Abdomen: soft  Bowel sounds: normal     Other findings: Several missing teeth (including front upper teeth), remaining dentition solid/none loose per patient          Anesthesia Plan    History of general anesthesia?: yes  History of complications of general anesthesia?: no    ASA 4     general and regional   (Plan GETA/PIVx2/A-line/CVP/LAMONT/post-op vent/possible post-op nerve blocks)  The patient is a current smoker. (last smoked ~3-4 days ago)  Patient was previously instructed to abstain from smoking on day of procedure.  Patient did not smoke on day of procedure.  Education provided regarding risk of obstructive sleep apnea.  intravenous induction   Postoperative administration of opioids is intended.  Anesthetic plan and risks discussed with patient.  Use of blood products discussed with patient who consented to blood products.    Plan discussed with attending and CRNA.

## 2025-03-25 NOTE — CARE PLAN
The patient's goals for the shift include      The clinical goals for the shift include patient will remain safe and without injury    Over the shift, the patient remained alert and oriented x4, on room air, and denied pain/discomfort throughout the shift. Patient involved with and aware of plan of care. Patient has remained free of falls throughout the shift, call bell within reach, bed locked in the lowest position with nonskid socks on. Purposeful rounding performed. Patient calls appropriately for assistance. See chart for further details.

## 2025-03-26 ENCOUNTER — APPOINTMENT (OUTPATIENT)
Dept: RADIOLOGY | Facility: HOSPITAL | Age: 65
DRG: 234 | End: 2025-03-26
Payer: COMMERCIAL

## 2025-03-26 ENCOUNTER — HOSPITAL ENCOUNTER (OUTPATIENT)
Dept: OPERATING ROOM | Facility: HOSPITAL | Age: 65
Discharge: HOME | End: 2025-03-26

## 2025-03-26 ENCOUNTER — ANESTHESIA (OUTPATIENT)
Dept: OPERATING ROOM | Facility: HOSPITAL | Age: 65
End: 2025-03-26
Payer: COMMERCIAL

## 2025-03-26 ENCOUNTER — APPOINTMENT (OUTPATIENT)
Dept: CARDIOLOGY | Facility: HOSPITAL | Age: 65
DRG: 234 | End: 2025-03-26
Payer: COMMERCIAL

## 2025-03-26 PROBLEM — F41.9 ANXIETY: Status: ACTIVE | Noted: 2025-03-26

## 2025-03-26 PROBLEM — G47.33 OSA (OBSTRUCTIVE SLEEP APNEA): Status: ACTIVE | Noted: 2025-03-26

## 2025-03-26 PROBLEM — M54.2 CHRONIC NECK PAIN: Status: ACTIVE | Noted: 2025-03-26

## 2025-03-26 PROBLEM — G62.9 PERIPHERAL NEUROPATHY: Status: ACTIVE | Noted: 2025-03-26

## 2025-03-26 PROBLEM — D64.9 ANEMIA: Status: ACTIVE | Noted: 2025-03-26

## 2025-03-26 PROBLEM — G89.29 CHRONIC NECK PAIN: Status: ACTIVE | Noted: 2025-03-26

## 2025-03-26 LAB
ALBUMIN SERPL BCP-MCNC: 3.4 G/DL (ref 3.4–5)
ALBUMIN SERPL BCP-MCNC: 3.9 G/DL (ref 3.4–5)
ANION GAP BLDA CALCULATED.4IONS-SCNC: 10 MMO/L (ref 10–25)
ANION GAP BLDA CALCULATED.4IONS-SCNC: 11 MMO/L (ref 10–25)
ANION GAP BLDA CALCULATED.4IONS-SCNC: 12 MMO/L (ref 10–25)
ANION GAP BLDA CALCULATED.4IONS-SCNC: 12 MMO/L (ref 10–25)
ANION GAP BLDA CALCULATED.4IONS-SCNC: 9 MMO/L (ref 10–25)
ANION GAP BLDA CALCULATED.4IONS-SCNC: 9 MMO/L (ref 10–25)
ANION GAP BLDV CALCULATED.4IONS-SCNC: 11 MMOL/L (ref 10–25)
ANION GAP SERPL CALC-SCNC: 13 MMOL/L (ref 10–20)
ANION GAP SERPL CALC-SCNC: 15 MMOL/L (ref 10–20)
AORTIC VALVE MEAN GRADIENT: 1 MMHG
AORTIC VALVE PEAK VELOCITY: 0.86 M/S
APTT PPP: 26 SECONDS (ref 26–36)
AV PEAK GRADIENT: 3 MMHG
BASE EXCESS BLDA CALC-SCNC: -0.3 MMOL/L (ref -2–3)
BASE EXCESS BLDA CALC-SCNC: -3.1 MMOL/L (ref -2–3)
BASE EXCESS BLDA CALC-SCNC: -3.2 MMOL/L (ref -2–3)
BASE EXCESS BLDA CALC-SCNC: -3.9 MMOL/L (ref -2–3)
BASE EXCESS BLDA CALC-SCNC: -5 MMOL/L (ref -2–3)
BASE EXCESS BLDA CALC-SCNC: -5.1 MMOL/L (ref -2–3)
BASE EXCESS BLDA CALC-SCNC: -5.1 MMOL/L (ref -2–3)
BASE EXCESS BLDA CALC-SCNC: -5.7 MMOL/L (ref -2–3)
BASE EXCESS BLDV CALC-SCNC: -4.2 MMOL/L (ref -2–3)
BASOPHILS # BLD AUTO: 0.02 X10*3/UL (ref 0–0.1)
BASOPHILS NFR BLD AUTO: 0.4 %
BODY TEMPERATURE: 37 DEGREES CELSIUS
BUN SERPL-MCNC: 40 MG/DL (ref 6–23)
BUN SERPL-MCNC: 44 MG/DL (ref 6–23)
CA-I BLD-SCNC: 1.18 MMOL/L (ref 1.1–1.33)
CA-I BLDA-SCNC: 1.14 MMOL/L (ref 1.1–1.33)
CA-I BLDA-SCNC: 1.15 MMOL/L (ref 1.1–1.33)
CA-I BLDA-SCNC: 1.16 MMOL/L (ref 1.1–1.33)
CA-I BLDA-SCNC: 1.2 MMOL/L (ref 1.1–1.33)
CA-I BLDA-SCNC: 1.2 MMOL/L (ref 1.1–1.33)
CA-I BLDA-SCNC: 1.22 MMOL/L (ref 1.1–1.33)
CA-I BLDA-SCNC: 1.22 MMOL/L (ref 1.1–1.33)
CA-I BLDA-SCNC: 1.28 MMOL/L (ref 1.1–1.33)
CA-I BLDV-SCNC: 1.14 MMOL/L (ref 1.1–1.33)
CALCIUM SERPL-MCNC: 8.6 MG/DL (ref 8.6–10.6)
CALCIUM SERPL-MCNC: 9.6 MG/DL (ref 8.6–10.6)
CFT FORM KAOLIN IND BLD RES TEG: 1.1 MIN (ref 0.8–2.1)
CFT FORM KAOLIN IND BLD RES TEG: 1.6 MIN (ref 0.8–2.1)
CHLORIDE BLDA-SCNC: 109 MMOL/L (ref 98–107)
CHLORIDE BLDA-SCNC: 110 MMOL/L (ref 98–107)
CHLORIDE BLDA-SCNC: 111 MMOL/L (ref 98–107)
CHLORIDE BLDA-SCNC: 112 MMOL/L (ref 98–107)
CHLORIDE BLDV-SCNC: 109 MMOL/L (ref 98–107)
CHLORIDE SERPL-SCNC: 107 MMOL/L (ref 98–107)
CHLORIDE SERPL-SCNC: 111 MMOL/L (ref 98–107)
CLOT ANGLE.KAOLIN INDUCED BLD RES TEG: 71 DEG (ref 63–78)
CLOT ANGLE.KAOLIN INDUCED BLD RES TEG: 74 DEG (ref 63–78)
CLOT INIT KAO IND P HEP NEUT BLD RES TEG: 7.2 MIN (ref 4.6–9.1)
CLOT INIT KAO IND P HEP NEUT BLD RES TEG: 8.3 MIN (ref 4.3–8.3)
CLOT INIT KAO IND P HEP NEUT BLD RES TEG: 8.8 MIN (ref 4.3–8.3)
CLOT INIT KAO IND P HEP NEUT BLD RES TEG: 9.2 MIN (ref 4.6–9.1)
CO2 SERPL-SCNC: 20 MMOL/L (ref 21–32)
CO2 SERPL-SCNC: 22 MMOL/L (ref 21–32)
COHGB MFR BLDA: 0.7 %
COHGB MFR BLDA: 0.8 %
COHGB MFR BLDA: 0.8 %
COHGB MFR BLDA: 0.9 %
COHGB MFR BLDA: 1.3 %
COHGB MFR BLDV: 1.5 %
CREAT SERPL-MCNC: 2.1 MG/DL (ref 0.5–1.05)
CREAT SERPL-MCNC: 2.39 MG/DL (ref 0.5–1.05)
DO-HGB MFR BLDA: 0 % (ref 0–5)
DO-HGB MFR BLDA: 0.1 % (ref 0–5)
DO-HGB MFR BLDA: 0.2 % (ref 0–5)
DO-HGB MFR BLDA: 0.5 % (ref 0–5)
DO-HGB MFR BLDA: 0.6 % (ref 0–5)
EGFRCR SERPLBLD CKD-EPI 2021: 22 ML/MIN/1.73M*2
EGFRCR SERPLBLD CKD-EPI 2021: 26 ML/MIN/1.73M*2
EJECTION FRACTION: 48 %
EOSINOPHIL # BLD AUTO: 0.07 X10*3/UL (ref 0–0.7)
EOSINOPHIL NFR BLD AUTO: 1.3 %
ERYTHROCYTE [DISTWIDTH] IN BLOOD BY AUTOMATED COUNT: 14.3 % (ref 11.5–14.5)
ERYTHROCYTE [DISTWIDTH] IN BLOOD BY AUTOMATED COUNT: 14.5 % (ref 11.5–14.5)
FIBRINOGEN BLD CALC-MCNC: 325 MG/DL (ref 278–581)
FIBRINOGEN BLD CALC-MCNC: 513 MG/DL (ref 278–581)
FIBRINOGEN PPP-MCNC: 195 MG/DL (ref 200–400)
GLUCOSE BLD MANUAL STRIP-MCNC: 109 MG/DL (ref 74–99)
GLUCOSE BLD MANUAL STRIP-MCNC: 177 MG/DL (ref 74–99)
GLUCOSE BLDA-MCNC: 115 MG/DL (ref 74–99)
GLUCOSE BLDA-MCNC: 139 MG/DL (ref 74–99)
GLUCOSE BLDA-MCNC: 139 MG/DL (ref 74–99)
GLUCOSE BLDA-MCNC: 161 MG/DL (ref 74–99)
GLUCOSE BLDA-MCNC: 169 MG/DL (ref 74–99)
GLUCOSE BLDA-MCNC: 196 MG/DL (ref 74–99)
GLUCOSE BLDA-MCNC: 203 MG/DL (ref 74–99)
GLUCOSE BLDA-MCNC: 215 MG/DL (ref 74–99)
GLUCOSE BLDV-MCNC: 205 MG/DL (ref 74–99)
GLUCOSE SERPL-MCNC: 116 MG/DL (ref 74–99)
GLUCOSE SERPL-MCNC: 160 MG/DL (ref 74–99)
HCO3 BLDA-SCNC: 19.7 MMOL/L (ref 22–26)
HCO3 BLDA-SCNC: 19.8 MMOL/L (ref 22–26)
HCO3 BLDA-SCNC: 20.1 MMOL/L (ref 22–26)
HCO3 BLDA-SCNC: 20.9 MMOL/L (ref 22–26)
HCO3 BLDA-SCNC: 21.2 MMOL/L (ref 22–26)
HCO3 BLDA-SCNC: 21.6 MMOL/L (ref 22–26)
HCO3 BLDA-SCNC: 22 MMOL/L (ref 22–26)
HCO3 BLDA-SCNC: 25.4 MMOL/L (ref 22–26)
HCO3 BLDV-SCNC: 21.6 MMOL/L (ref 22–26)
HCT VFR BLD AUTO: 31.7 % (ref 36–46)
HCT VFR BLD AUTO: 32.6 % (ref 36–46)
HCT VFR BLD EST: 23 % (ref 36–46)
HCT VFR BLD EST: 26 % (ref 36–46)
HCT VFR BLD EST: 29 % (ref 36–46)
HCT VFR BLD EST: 29 % (ref 36–46)
HCT VFR BLD EST: 34 % (ref 36–46)
HGB BLD-MCNC: 10.7 G/DL (ref 12–16)
HGB BLD-MCNC: 9.8 G/DL (ref 12–16)
HGB BLDA-MCNC: 11.2 G/DL (ref 12–16)
HGB BLDA-MCNC: 7.7 G/DL (ref 12–16)
HGB BLDA-MCNC: 7.7 G/DL (ref 12–16)
HGB BLDA-MCNC: 7.8 G/DL (ref 12–16)
HGB BLDA-MCNC: 8.5 G/DL (ref 12–16)
HGB BLDA-MCNC: 8.5 G/DL (ref 12–16)
HGB BLDA-MCNC: 9.7 G/DL (ref 12–16)
HGB BLDA-MCNC: 9.7 G/DL (ref 12–16)
HGB BLDA-MCNC: 9.8 G/DL (ref 12–16)
HGB BLDA-MCNC: 9.8 G/DL (ref 12–16)
HGB BLDV-MCNC: 7.8 G/DL (ref 12–16)
IMM GRANULOCYTES # BLD AUTO: 0 X10*3/UL (ref 0–0.7)
IMM GRANULOCYTES NFR BLD AUTO: 0 % (ref 0–0.9)
INHALED O2 CONCENTRATION: 100 %
INHALED O2 CONCENTRATION: 21 %
INHALED O2 CONCENTRATION: 40 %
INHALED O2 CONCENTRATION: 70 %
INR PPP: 1 (ref 0.9–1.1)
LACTATE BLDA-SCNC: 0.5 MMOL/L (ref 0.4–2)
LACTATE BLDA-SCNC: 0.5 MMOL/L (ref 0.4–2)
LACTATE BLDA-SCNC: 0.8 MMOL/L (ref 0.4–2)
LACTATE BLDA-SCNC: 0.9 MMOL/L (ref 0.4–2)
LACTATE BLDA-SCNC: 1.1 MMOL/L (ref 0.4–2)
LACTATE BLDA-SCNC: 1.7 MMOL/L (ref 0.4–2)
LACTATE BLDA-SCNC: 1.9 MMOL/L (ref 0.4–2)
LACTATE BLDA-SCNC: 1.9 MMOL/L (ref 0.4–2)
LACTATE BLDV-SCNC: 1.2 MMOL/L (ref 0.4–2)
LEFT VENTRICLE INTERNAL DIMENSION DIASTOLE: 2.49 CM (ref 3.5–6)
LEFT VENTRICULAR OUTFLOW TRACT DIAMETER: 1.71 CM
LYMPHOCYTES # BLD AUTO: 2.38 X10*3/UL (ref 1.2–4.8)
LYMPHOCYTES NFR BLD AUTO: 44.4 %
MA KAOLIN BLD RES TEG: 53 MM (ref 52–69)
MA KAOLIN BLD RES TEG: 64 MM (ref 52–69)
MA KAOLIN+TF BLD RES TEG: 53 MM (ref 52–70)
MA KAOLIN+TF BLD RES TEG: 67 MM (ref 52–70)
MA TF IND+IIB-IIIA INH BLD RES TEG: 18 MM (ref 15–32)
MA TF IND+IIB-IIIA INH BLD RES TEG: 28 MM (ref 15–32)
MAGNESIUM SERPL-MCNC: 2.61 MG/DL (ref 1.6–2.4)
MAGNESIUM SERPL-MCNC: 4.19 MG/DL (ref 1.6–2.4)
MCH RBC QN AUTO: 30.1 PG (ref 26–34)
MCH RBC QN AUTO: 30.9 PG (ref 26–34)
MCHC RBC AUTO-ENTMCNC: 30.9 G/DL (ref 32–36)
MCHC RBC AUTO-ENTMCNC: 32.8 G/DL (ref 32–36)
MCV RBC AUTO: 100 FL (ref 80–100)
MCV RBC AUTO: 92 FL (ref 80–100)
METHGB MFR BLDA: 1 % (ref 0–1.5)
METHGB MFR BLDA: 1.1 % (ref 0–1.5)
METHGB MFR BLDA: 1.2 % (ref 0–1.5)
METHGB MFR BLDA: 1.3 % (ref 0–1.5)
METHGB MFR BLDA: 1.3 % (ref 0–1.5)
METHGB MFR BLDA: 1.4 % (ref 0–1.5)
METHGB MFR BLDA: 1.5 % (ref 0–1.5)
METHGB MFR BLDV: 0.7 % (ref 0–1.5)
MITRAL VALVE E/A RATIO: 1.05
MONOCYTES # BLD AUTO: 0.58 X10*3/UL (ref 0.1–1)
MONOCYTES NFR BLD AUTO: 10.8 %
NEUTROPHILS # BLD AUTO: 2.31 X10*3/UL (ref 1.2–7.7)
NEUTROPHILS NFR BLD AUTO: 43.1 %
NRBC BLD-RTO: 0 /100 WBCS (ref 0–0)
NRBC BLD-RTO: 0 /100 WBCS (ref 0–0)
OXYHGB MFR BLDA: 96.4 % (ref 94–98)
OXYHGB MFR BLDA: 97.4 % (ref 94–98)
OXYHGB MFR BLDA: 97.4 % (ref 94–98)
OXYHGB MFR BLDA: 97.5 % (ref 94–98)
OXYHGB MFR BLDA: 97.5 % (ref 94–98)
OXYHGB MFR BLDA: 97.6 % (ref 94–98)
OXYHGB MFR BLDA: 97.7 % (ref 94–98)
OXYHGB MFR BLDA: 97.7 % (ref 94–98)
OXYHGB MFR BLDA: 97.8 % (ref 94–98)
OXYHGB MFR BLDV: 81 % (ref 45–75)
PCO2 BLDA: 33 MM HG (ref 38–42)
PCO2 BLDA: 34 MM HG (ref 38–42)
PCO2 BLDA: 35 MM HG (ref 38–42)
PCO2 BLDA: 39 MM HG (ref 38–42)
PCO2 BLDA: 40 MM HG (ref 38–42)
PCO2 BLDA: 40 MM HG (ref 38–42)
PCO2 BLDA: 44 MM HG (ref 38–42)
PCO2 BLDA: 46 MM HG (ref 38–42)
PCO2 BLDV: 42 MM HG (ref 41–51)
PH BLDA: 7.29 PH (ref 7.38–7.42)
PH BLDA: 7.31 PH (ref 7.38–7.42)
PH BLDA: 7.34 PH (ref 7.38–7.42)
PH BLDA: 7.35 PH (ref 7.38–7.42)
PH BLDA: 7.36 PH (ref 7.38–7.42)
PH BLDA: 7.36 PH (ref 7.38–7.42)
PH BLDA: 7.37 PH (ref 7.38–7.42)
PH BLDA: 7.41 PH (ref 7.38–7.42)
PH BLDV: 7.32 PH (ref 7.33–7.43)
PHOSPHATE SERPL-MCNC: 2.3 MG/DL (ref 2.5–4.9)
PHOSPHATE SERPL-MCNC: 4.2 MG/DL (ref 2.5–4.9)
PLATELET # BLD AUTO: 165 X10*3/UL (ref 150–450)
PLATELET # BLD AUTO: 98 X10*3/UL (ref 150–450)
PO2 BLDA: 109 MM HG (ref 85–95)
PO2 BLDA: 119 MM HG (ref 85–95)
PO2 BLDA: 258 MM HG (ref 85–95)
PO2 BLDA: 299 MM HG (ref 85–95)
PO2 BLDA: 357 MM HG (ref 85–95)
PO2 BLDA: 394 MM HG (ref 85–95)
PO2 BLDA: 439 MM HG (ref 85–95)
PO2 BLDA: 473 MM HG (ref 85–95)
PO2 BLDV: 46 MM HG (ref 35–45)
POTASSIUM BLDA-SCNC: 4.2 MMOL/L (ref 3.5–5.3)
POTASSIUM BLDA-SCNC: 4.8 MMOL/L (ref 3.5–5.3)
POTASSIUM BLDA-SCNC: 4.9 MMOL/L (ref 3.5–5.3)
POTASSIUM BLDA-SCNC: 4.9 MMOL/L (ref 3.5–5.3)
POTASSIUM BLDA-SCNC: 5 MMOL/L (ref 3.5–5.3)
POTASSIUM BLDA-SCNC: 5.8 MMOL/L (ref 3.5–5.3)
POTASSIUM BLDA-SCNC: 6.3 MMOL/L (ref 3.5–5.3)
POTASSIUM BLDA-SCNC: 6.6 MMOL/L (ref 3.5–5.3)
POTASSIUM BLDV-SCNC: 6.4 MMOL/L (ref 3.5–5.3)
POTASSIUM SERPL-SCNC: 4.3 MMOL/L (ref 3.5–5.3)
POTASSIUM SERPL-SCNC: 4.8 MMOL/L (ref 3.5–5.3)
PROTHROMBIN TIME: 11.5 SECONDS (ref 9.8–12.4)
RBC # BLD AUTO: 3.17 X10*6/UL (ref 4–5.2)
RBC # BLD AUTO: 3.55 X10*6/UL (ref 4–5.2)
SAO2 % BLDA: 100 % (ref 94–100)
SAO2 % BLDA: 99 % (ref 94–100)
SAO2 % BLDA: 99 % (ref 94–100)
SAO2 % BLDV: 83 % (ref 45–75)
SODIUM BLDA-SCNC: 135 MMOL/L (ref 136–145)
SODIUM BLDA-SCNC: 138 MMOL/L (ref 136–145)
SODIUM BLDA-SCNC: 138 MMOL/L (ref 136–145)
SODIUM BLDA-SCNC: 139 MMOL/L (ref 136–145)
SODIUM BLDV-SCNC: 135 MMOL/L (ref 136–145)
SODIUM SERPL-SCNC: 138 MMOL/L (ref 136–145)
SODIUM SERPL-SCNC: 141 MMOL/L (ref 136–145)
TEST COMMENT: ABNORMAL
TEST COMMENT: ABNORMAL
WBC # BLD AUTO: 10.6 X10*3/UL (ref 4.4–11.3)
WBC # BLD AUTO: 5.4 X10*3/UL (ref 4.4–11.3)

## 2025-03-26 PROCEDURE — 36415 COLL VENOUS BLD VENIPUNCTURE: CPT | Performed by: STUDENT IN AN ORGANIZED HEALTH CARE EDUCATION/TRAINING PROGRAM

## 2025-03-26 PROCEDURE — 99291 CRITICAL CARE FIRST HOUR: CPT

## 2025-03-26 PROCEDURE — 84132 ASSAY OF SERUM POTASSIUM: CPT

## 2025-03-26 PROCEDURE — 3600000017 HC OR TIME - EACH INCREMENTAL 1 MINUTE - PROCEDURE LEVEL SIX: Performed by: STUDENT IN AN ORGANIZED HEALTH CARE EDUCATION/TRAINING PROGRAM

## 2025-03-26 PROCEDURE — 99223 1ST HOSP IP/OBS HIGH 75: CPT

## 2025-03-26 PROCEDURE — 3700000002 HC GENERAL ANESTHESIA TIME - EACH INCREMENTAL 1 MINUTE: Performed by: STUDENT IN AN ORGANIZED HEALTH CARE EDUCATION/TRAINING PROGRAM

## 2025-03-26 PROCEDURE — 2500000004 HC RX 250 GENERAL PHARMACY W/ HCPCS (ALT 636 FOR OP/ED): Performed by: PHYSICAL THERAPIST

## 2025-03-26 PROCEDURE — 82330 ASSAY OF CALCIUM: CPT

## 2025-03-26 PROCEDURE — 33518 CABG ARTERY-VEIN TWO: CPT | Performed by: STUDENT IN AN ORGANIZED HEALTH CARE EDUCATION/TRAINING PROGRAM

## 2025-03-26 PROCEDURE — 64468 THRC FASCIAL PLN BLK BI NJX: CPT | Performed by: ANESTHESIOLOGY

## 2025-03-26 PROCEDURE — 82947 ASSAY GLUCOSE BLOOD QUANT: CPT

## 2025-03-26 PROCEDURE — A33535 PR CABG, ARTERIAL, THREE: Performed by: ANESTHESIOLOGY

## 2025-03-26 PROCEDURE — 83735 ASSAY OF MAGNESIUM: CPT | Performed by: STUDENT IN AN ORGANIZED HEALTH CARE EDUCATION/TRAINING PROGRAM

## 2025-03-26 PROCEDURE — 80069 RENAL FUNCTION PANEL: CPT | Performed by: STUDENT IN AN ORGANIZED HEALTH CARE EDUCATION/TRAINING PROGRAM

## 2025-03-26 PROCEDURE — 83050 HGB METHEMOGLOBIN QUAN: CPT

## 2025-03-26 PROCEDURE — 06BP4ZZ EXCISION OF RIGHT SAPHENOUS VEIN, PERCUTANEOUS ENDOSCOPIC APPROACH: ICD-10-PCS | Performed by: STUDENT IN AN ORGANIZED HEALTH CARE EDUCATION/TRAINING PROGRAM

## 2025-03-26 PROCEDURE — C9248 INJ, CLEVIDIPINE BUTYRATE: HCPCS | Mod: JZ,TB

## 2025-03-26 PROCEDURE — 71045 X-RAY EXAM CHEST 1 VIEW: CPT

## 2025-03-26 PROCEDURE — 2500000004 HC RX 250 GENERAL PHARMACY W/ HCPCS (ALT 636 FOR OP/ED): Performed by: STUDENT IN AN ORGANIZED HEALTH CARE EDUCATION/TRAINING PROGRAM

## 2025-03-26 PROCEDURE — 85730 THROMBOPLASTIN TIME PARTIAL: CPT

## 2025-03-26 PROCEDURE — 2500000002 HC RX 250 W HCPCS SELF ADMINISTERED DRUGS (ALT 637 FOR MEDICARE OP, ALT 636 FOR OP/ED)

## 2025-03-26 PROCEDURE — 83735 ASSAY OF MAGNESIUM: CPT

## 2025-03-26 PROCEDURE — 3600000018 HC OR TIME - INITIAL BASE CHARGE - PROCEDURE LEVEL SIX: Performed by: STUDENT IN AN ORGANIZED HEALTH CARE EDUCATION/TRAINING PROGRAM

## 2025-03-26 PROCEDURE — P9047 ALBUMIN (HUMAN), 25%, 50ML: HCPCS | Performed by: STUDENT IN AN ORGANIZED HEALTH CARE EDUCATION/TRAINING PROGRAM

## 2025-03-26 PROCEDURE — 2780000003 HC OR 278 NO HCPCS: Performed by: STUDENT IN AN ORGANIZED HEALTH CARE EDUCATION/TRAINING PROGRAM

## 2025-03-26 PROCEDURE — 2020000001 HC ICU ROOM DAILY

## 2025-03-26 PROCEDURE — 2500000004 HC RX 250 GENERAL PHARMACY W/ HCPCS (ALT 636 FOR OP/ED): Mod: JZ,TB

## 2025-03-26 PROCEDURE — 33508 ENDOSCOPIC VEIN HARVEST: CPT | Performed by: STUDENT IN AN ORGANIZED HEALTH CARE EDUCATION/TRAINING PROGRAM

## 2025-03-26 PROCEDURE — 2500000005 HC RX 250 GENERAL PHARMACY W/O HCPCS

## 2025-03-26 PROCEDURE — A4312 CATH W/O BAG 2-WAY SILICONE: HCPCS | Performed by: STUDENT IN AN ORGANIZED HEALTH CARE EDUCATION/TRAINING PROGRAM

## 2025-03-26 PROCEDURE — 02100Z9 BYPASS CORONARY ARTERY, ONE ARTERY FROM LEFT INTERNAL MAMMARY, OPEN APPROACH: ICD-10-PCS | Performed by: STUDENT IN AN ORGANIZED HEALTH CARE EDUCATION/TRAINING PROGRAM

## 2025-03-26 PROCEDURE — 76937 US GUIDE VASCULAR ACCESS: CPT

## 2025-03-26 PROCEDURE — C1889 IMPLANT/INSERT DEVICE, NOC: HCPCS | Performed by: STUDENT IN AN ORGANIZED HEALTH CARE EDUCATION/TRAINING PROGRAM

## 2025-03-26 PROCEDURE — 64488 TAP BLOCK BI INJECTION: CPT | Performed by: ANESTHESIOLOGY

## 2025-03-26 PROCEDURE — P9045 ALBUMIN (HUMAN), 5%, 250 ML: HCPCS | Mod: JZ,TB | Performed by: STUDENT IN AN ORGANIZED HEALTH CARE EDUCATION/TRAINING PROGRAM

## 2025-03-26 PROCEDURE — 85025 COMPLETE CBC W/AUTO DIFF WBC: CPT | Performed by: STUDENT IN AN ORGANIZED HEALTH CARE EDUCATION/TRAINING PROGRAM

## 2025-03-26 PROCEDURE — P9047 ALBUMIN (HUMAN), 25%, 50ML: HCPCS

## 2025-03-26 PROCEDURE — 02L70CK OCCLUSION OF LEFT ATRIAL APPENDAGE WITH EXTRALUMINAL DEVICE, OPEN APPROACH: ICD-10-PCS | Performed by: STUDENT IN AN ORGANIZED HEALTH CARE EDUCATION/TRAINING PROGRAM

## 2025-03-26 PROCEDURE — A33535 PR CABG, ARTERIAL, THREE

## 2025-03-26 PROCEDURE — 3600000012 HC PERFUSION TIME - EACH INCREMENTAL 1 MINUTE: Performed by: STUDENT IN AN ORGANIZED HEALTH CARE EDUCATION/TRAINING PROGRAM

## 2025-03-26 PROCEDURE — 3600000011 HC PERFUSION TIME - INITIAL BASE CHARGE: Performed by: STUDENT IN AN ORGANIZED HEALTH CARE EDUCATION/TRAINING PROGRAM

## 2025-03-26 PROCEDURE — 37799 UNLISTED PX VASCULAR SURGERY: CPT

## 2025-03-26 PROCEDURE — 021109W BYPASS CORONARY ARTERY, TWO ARTERIES FROM AORTA WITH AUTOLOGOUS VENOUS TISSUE, OPEN APPROACH: ICD-10-PCS | Performed by: STUDENT IN AN ORGANIZED HEALTH CARE EDUCATION/TRAINING PROGRAM

## 2025-03-26 PROCEDURE — 85027 COMPLETE CBC AUTOMATED: CPT

## 2025-03-26 PROCEDURE — P9016 RBC LEUKOCYTES REDUCED: HCPCS

## 2025-03-26 PROCEDURE — 85347 COAGULATION TIME ACTIVATED: CPT

## 2025-03-26 PROCEDURE — 36556 INSERT NON-TUNNEL CV CATH: CPT | Performed by: ANESTHESIOLOGY

## 2025-03-26 PROCEDURE — 2500000005 HC RX 250 GENERAL PHARMACY W/O HCPCS: Performed by: STUDENT IN AN ORGANIZED HEALTH CARE EDUCATION/TRAINING PROGRAM

## 2025-03-26 PROCEDURE — 2500000005 HC RX 250 GENERAL PHARMACY W/O HCPCS: Performed by: PHYSICAL THERAPIST

## 2025-03-26 PROCEDURE — 85384 FIBRINOGEN ACTIVITY: CPT

## 2025-03-26 PROCEDURE — 0W9D0ZZ DRAINAGE OF PERICARDIAL CAVITY, OPEN APPROACH: ICD-10-PCS | Performed by: STUDENT IN AN ORGANIZED HEALTH CARE EDUCATION/TRAINING PROGRAM

## 2025-03-26 PROCEDURE — 85576 BLOOD PLATELET AGGREGATION: CPT

## 2025-03-26 PROCEDURE — 3700000001 HC GENERAL ANESTHESIA TIME - INITIAL BASE CHARGE: Performed by: STUDENT IN AN ORGANIZED HEALTH CARE EDUCATION/TRAINING PROGRAM

## 2025-03-26 PROCEDURE — 36556 INSERT NON-TUNNEL CV CATH: CPT

## 2025-03-26 PROCEDURE — 36430 TRANSFUSION BLD/BLD COMPNT: CPT

## 2025-03-26 PROCEDURE — 33533 CABG ARTERIAL SINGLE: CPT | Performed by: STUDENT IN AN ORGANIZED HEALTH CARE EDUCATION/TRAINING PROGRAM

## 2025-03-26 PROCEDURE — 76937 US GUIDE VASCULAR ACCESS: CPT | Performed by: ANESTHESIOLOGY

## 2025-03-26 PROCEDURE — 2720000007 HC OR 272 NO HCPCS: Performed by: STUDENT IN AN ORGANIZED HEALTH CARE EDUCATION/TRAINING PROGRAM

## 2025-03-26 PROCEDURE — 36620 INSERTION CATHETER ARTERY: CPT | Performed by: ANESTHESIOLOGY

## 2025-03-26 PROCEDURE — A4649 SURGICAL SUPPLIES: HCPCS | Performed by: STUDENT IN AN ORGANIZED HEALTH CARE EDUCATION/TRAINING PROGRAM

## 2025-03-26 PROCEDURE — 2500000004 HC RX 250 GENERAL PHARMACY W/ HCPCS (ALT 636 FOR OP/ED)

## 2025-03-26 PROCEDURE — 93005 ELECTROCARDIOGRAM TRACING: CPT

## 2025-03-26 PROCEDURE — 5A1221Z PERFORMANCE OF CARDIAC OUTPUT, CONTINUOUS: ICD-10-PCS | Performed by: STUDENT IN AN ORGANIZED HEALTH CARE EDUCATION/TRAINING PROGRAM

## 2025-03-26 PROCEDURE — 2500000001 HC RX 250 WO HCPCS SELF ADMINISTERED DRUGS (ALT 637 FOR MEDICARE OP)

## 2025-03-26 DEVICE — ATRICLIP FLEX•V DEVICE 40
Type: IMPLANTABLE DEVICE | Site: HEART | Status: FUNCTIONAL
Brand: ATRICLIP FLEX•V

## 2025-03-26 RX ORDER — MAGNESIUM SULFATE HEPTAHYDRATE 40 MG/ML
2 INJECTION, SOLUTION INTRAVENOUS EVERY 6 HOURS PRN
Status: DISCONTINUED | OUTPATIENT
Start: 2025-03-26 | End: 2025-03-28

## 2025-03-26 RX ORDER — PROPOFOL 10 MG/ML
INJECTION, EMULSION INTRAVENOUS AS NEEDED
Status: DISCONTINUED | OUTPATIENT
Start: 2025-03-26 | End: 2025-03-26

## 2025-03-26 RX ORDER — PANTOPRAZOLE SODIUM 40 MG/1
40 TABLET, DELAYED RELEASE ORAL
Status: DISCONTINUED | OUTPATIENT
Start: 2025-03-27 | End: 2025-03-27

## 2025-03-26 RX ORDER — ONDANSETRON HYDROCHLORIDE 2 MG/ML
4 INJECTION, SOLUTION INTRAVENOUS EVERY 8 HOURS PRN
Status: DISCONTINUED | OUTPATIENT
Start: 2025-03-26 | End: 2025-04-01 | Stop reason: HOSPADM

## 2025-03-26 RX ORDER — NALOXONE HYDROCHLORIDE 0.4 MG/ML
0.2 INJECTION, SOLUTION INTRAMUSCULAR; INTRAVENOUS; SUBCUTANEOUS EVERY 5 MIN PRN
Status: DISCONTINUED | OUTPATIENT
Start: 2025-03-26 | End: 2025-04-01 | Stop reason: HOSPADM

## 2025-03-26 RX ORDER — LIDOCAINE HYDROCHLORIDE 20 MG/ML
INJECTION, SOLUTION INFILTRATION; PERINEURAL AS NEEDED
Status: DISCONTINUED | OUTPATIENT
Start: 2025-03-26 | End: 2025-03-26

## 2025-03-26 RX ORDER — CEFAZOLIN 1 G/1
INJECTION, POWDER, FOR SOLUTION INTRAVENOUS AS NEEDED
Status: DISCONTINUED | OUTPATIENT
Start: 2025-03-26 | End: 2025-03-26

## 2025-03-26 RX ORDER — SODIUM CHLORIDE, SODIUM GLUCONATE, SODIUM ACETATE, POTASSIUM CHLORIDE AND MAGNESIUM CHLORIDE 30; 37; 368; 526; 502 MG/100ML; MG/100ML; MG/100ML; MG/100ML; MG/100ML
INJECTION, SOLUTION INTRAVENOUS CONTINUOUS PRN
Status: DISCONTINUED | OUTPATIENT
Start: 2025-03-26 | End: 2025-03-26

## 2025-03-26 RX ORDER — ROCURONIUM BROMIDE 10 MG/ML
INJECTION, SOLUTION INTRAVENOUS AS NEEDED
Status: DISCONTINUED | OUTPATIENT
Start: 2025-03-26 | End: 2025-03-26

## 2025-03-26 RX ORDER — DEXMEDETOMIDINE HYDROCHLORIDE 4 UG/ML
0-1 INJECTION, SOLUTION INTRAVENOUS CONTINUOUS
Status: DISCONTINUED | OUTPATIENT
Start: 2025-03-26 | End: 2025-03-27

## 2025-03-26 RX ORDER — POTASSIUM CHLORIDE 1.5 G/1.58G
20 POWDER, FOR SOLUTION ORAL EVERY 6 HOURS PRN
Status: DISCONTINUED | OUTPATIENT
Start: 2025-03-26 | End: 2025-03-28

## 2025-03-26 RX ORDER — MAGNESIUM SULFATE HEPTAHYDRATE 40 MG/ML
4 INJECTION, SOLUTION INTRAVENOUS EVERY 6 HOURS PRN
Status: DISCONTINUED | OUTPATIENT
Start: 2025-03-26 | End: 2025-03-28

## 2025-03-26 RX ORDER — CEFAZOLIN SODIUM 2 G/100ML
2 INJECTION, SOLUTION INTRAVENOUS EVERY 12 HOURS
Status: COMPLETED | OUTPATIENT
Start: 2025-03-27 | End: 2025-03-28

## 2025-03-26 RX ORDER — FENTANYL CITRATE 50 UG/ML
INJECTION, SOLUTION INTRAMUSCULAR; INTRAVENOUS AS NEEDED
Status: DISCONTINUED | OUTPATIENT
Start: 2025-03-26 | End: 2025-03-26

## 2025-03-26 RX ORDER — NAPROXEN SODIUM 220 MG/1
81 TABLET, FILM COATED ORAL DAILY
Status: DISCONTINUED | OUTPATIENT
Start: 2025-03-26 | End: 2025-03-27

## 2025-03-26 RX ORDER — POTASSIUM CHLORIDE 29.8 MG/ML
40 INJECTION INTRAVENOUS EVERY 6 HOURS PRN
Status: DISCONTINUED | OUTPATIENT
Start: 2025-03-26 | End: 2025-03-28

## 2025-03-26 RX ORDER — POTASSIUM CHLORIDE 20 MEQ/1
40 TABLET, EXTENDED RELEASE ORAL EVERY 6 HOURS PRN
Status: DISCONTINUED | OUTPATIENT
Start: 2025-03-26 | End: 2025-03-28

## 2025-03-26 RX ORDER — INDOMETHACIN 25 MG/1
CAPSULE ORAL AS NEEDED
Status: DISCONTINUED | OUTPATIENT
Start: 2025-03-26 | End: 2025-03-26

## 2025-03-26 RX ORDER — PROPOFOL 10 MG/ML
0-50 INJECTION, EMULSION INTRAVENOUS CONTINUOUS
Status: DISCONTINUED | OUTPATIENT
Start: 2025-03-26 | End: 2025-03-27

## 2025-03-26 RX ORDER — ACETAMINOPHEN 325 MG/1
650 TABLET ORAL EVERY 6 HOURS
Status: DISCONTINUED | OUTPATIENT
Start: 2025-03-26 | End: 2025-03-28

## 2025-03-26 RX ORDER — PANTOPRAZOLE SODIUM 40 MG/10ML
40 INJECTION, POWDER, LYOPHILIZED, FOR SOLUTION INTRAVENOUS
Status: DISCONTINUED | OUTPATIENT
Start: 2025-03-27 | End: 2025-03-27

## 2025-03-26 RX ORDER — CALCIUM GLUCONATE 20 MG/ML
2 INJECTION, SOLUTION INTRAVENOUS EVERY 6 HOURS PRN
Status: DISCONTINUED | OUTPATIENT
Start: 2025-03-26 | End: 2025-03-28

## 2025-03-26 RX ORDER — POTASSIUM CHLORIDE 20 MEQ/1
20 TABLET, EXTENDED RELEASE ORAL EVERY 6 HOURS PRN
Status: DISCONTINUED | OUTPATIENT
Start: 2025-03-26 | End: 2025-03-28

## 2025-03-26 RX ORDER — POTASSIUM CHLORIDE 14.9 MG/ML
20 INJECTION INTRAVENOUS EVERY 6 HOURS PRN
Status: DISCONTINUED | OUTPATIENT
Start: 2025-03-26 | End: 2025-03-28

## 2025-03-26 RX ORDER — NOREPINEPHRINE BITARTRATE 0.03 MG/ML
INJECTION, SOLUTION INTRAVENOUS CONTINUOUS PRN
Status: DISCONTINUED | OUTPATIENT
Start: 2025-03-26 | End: 2025-03-26

## 2025-03-26 RX ORDER — SODIUM CHLORIDE 0.9 G/100ML
INJECTION, SOLUTION IRRIGATION AS NEEDED
Status: DISCONTINUED | OUTPATIENT
Start: 2025-03-26 | End: 2025-03-26 | Stop reason: HOSPADM

## 2025-03-26 RX ORDER — SODIUM CHLORIDE, SODIUM LACTATE, POTASSIUM CHLORIDE, CALCIUM CHLORIDE 600; 310; 30; 20 MG/100ML; MG/100ML; MG/100ML; MG/100ML
30 INJECTION, SOLUTION INTRAVENOUS CONTINUOUS
Status: DISCONTINUED | OUTPATIENT
Start: 2025-03-26 | End: 2025-03-28

## 2025-03-26 RX ORDER — SODIUM CHLORIDE, SODIUM LACTATE, POTASSIUM CHLORIDE, CALCIUM CHLORIDE 600; 310; 30; 20 MG/100ML; MG/100ML; MG/100ML; MG/100ML
5 INJECTION, SOLUTION INTRAVENOUS CONTINUOUS
Status: DISCONTINUED | OUTPATIENT
Start: 2025-03-26 | End: 2025-03-28

## 2025-03-26 RX ORDER — PHENYLEPHRINE HYDROCHLORIDE 10 MG/ML
INJECTION INTRAVENOUS AS NEEDED
Status: DISCONTINUED | OUTPATIENT
Start: 2025-03-26 | End: 2025-03-26

## 2025-03-26 RX ORDER — MAGNESIUM SULFATE HEPTAHYDRATE 500 MG/ML
INJECTION, SOLUTION INTRAMUSCULAR; INTRAVENOUS AS NEEDED
Status: DISCONTINUED | OUTPATIENT
Start: 2025-03-26 | End: 2025-03-26

## 2025-03-26 RX ORDER — HEPARIN SODIUM 1000 [USP'U]/ML
INJECTION, SOLUTION INTRAVENOUS; SUBCUTANEOUS AS NEEDED
Status: DISCONTINUED | OUTPATIENT
Start: 2025-03-26 | End: 2025-03-26 | Stop reason: HOSPADM

## 2025-03-26 RX ORDER — POLYETHYLENE GLYCOL 3350 17 G/17G
17 POWDER, FOR SOLUTION ORAL 2 TIMES DAILY
Status: DISCONTINUED | OUTPATIENT
Start: 2025-03-26 | End: 2025-04-01 | Stop reason: HOSPADM

## 2025-03-26 RX ORDER — EPINEPHRINE IN 0.9 % SOD CHLOR 4MG/250ML
PLASTIC BAG, INJECTION (ML) INTRAVENOUS CONTINUOUS PRN
Status: DISCONTINUED | OUTPATIENT
Start: 2025-03-26 | End: 2025-03-26

## 2025-03-26 RX ORDER — ONDANSETRON 4 MG/1
4 TABLET, FILM COATED ORAL EVERY 8 HOURS PRN
Status: DISCONTINUED | OUTPATIENT
Start: 2025-03-26 | End: 2025-04-01 | Stop reason: HOSPADM

## 2025-03-26 RX ORDER — ALBUMIN HUMAN 50 G/1000ML
25 SOLUTION INTRAVENOUS ONCE
Status: COMPLETED | OUTPATIENT
Start: 2025-03-26 | End: 2025-03-26

## 2025-03-26 RX ORDER — ATORVASTATIN CALCIUM 80 MG/1
80 TABLET, FILM COATED ORAL NIGHTLY
Status: DISCONTINUED | OUTPATIENT
Start: 2025-03-27 | End: 2025-04-01 | Stop reason: HOSPADM

## 2025-03-26 RX ORDER — METHADONE IN SOD CHLOR,ISO-OSM 10 MG/ML
SYRINGE (ML) INTRAVENOUS AS NEEDED
Status: DISCONTINUED | OUTPATIENT
Start: 2025-03-26 | End: 2025-03-26

## 2025-03-26 RX ORDER — LIDOCAINE HCL/PF 100 MG/5ML
SYRINGE (ML) INTRAVENOUS AS NEEDED
Status: DISCONTINUED | OUTPATIENT
Start: 2025-03-26 | End: 2025-03-26

## 2025-03-26 RX ORDER — CEFAZOLIN SODIUM 2 G/100ML
2 INJECTION, SOLUTION INTRAVENOUS EVERY 8 HOURS
Status: DISCONTINUED | OUTPATIENT
Start: 2025-03-26 | End: 2025-03-26

## 2025-03-26 RX ORDER — PHENYLEPHRINE 10 MG/250 ML(40 MCG/ML)IN 0.9 % SOD.CHLORIDE INTRAVENOUS
CONTINUOUS PRN
Status: DISCONTINUED | OUTPATIENT
Start: 2025-03-26 | End: 2025-03-26

## 2025-03-26 RX ORDER — VANCOMYCIN HYDROCHLORIDE 1 G/20ML
INJECTION, POWDER, LYOPHILIZED, FOR SOLUTION INTRAVENOUS AS NEEDED
Status: DISCONTINUED | OUTPATIENT
Start: 2025-03-26 | End: 2025-03-26 | Stop reason: HOSPADM

## 2025-03-26 RX ORDER — POTASSIUM CHLORIDE 1.5 G/1.58G
40 POWDER, FOR SOLUTION ORAL EVERY 6 HOURS PRN
Status: DISCONTINUED | OUTPATIENT
Start: 2025-03-26 | End: 2025-03-28

## 2025-03-26 RX ORDER — INSULIN LISPRO 100 [IU]/ML
0-15 INJECTION, SOLUTION INTRAVENOUS; SUBCUTANEOUS EVERY 4 HOURS
Status: DISCONTINUED | OUTPATIENT
Start: 2025-03-26 | End: 2025-03-27

## 2025-03-26 RX ORDER — CALCIUM GLUCONATE 20 MG/ML
1 INJECTION, SOLUTION INTRAVENOUS EVERY 6 HOURS PRN
Status: DISCONTINUED | OUTPATIENT
Start: 2025-03-26 | End: 2025-03-28

## 2025-03-26 RX ORDER — NITROGLYCERIN 40 MG/100ML
INJECTION INTRAVENOUS AS NEEDED
Status: DISCONTINUED | OUTPATIENT
Start: 2025-03-26 | End: 2025-03-26

## 2025-03-26 RX ORDER — OXYCODONE HYDROCHLORIDE 5 MG/1
5 TABLET ORAL EVERY 4 HOURS PRN
Status: DISCONTINUED | OUTPATIENT
Start: 2025-03-26 | End: 2025-04-01 | Stop reason: HOSPADM

## 2025-03-26 RX ORDER — OXYCODONE HYDROCHLORIDE 10 MG/1
10 TABLET ORAL EVERY 4 HOURS PRN
Status: DISCONTINUED | OUTPATIENT
Start: 2025-03-26 | End: 2025-03-29

## 2025-03-26 RX ORDER — SODIUM CHLORIDE 9 MG/ML
INJECTION, SOLUTION INTRAVENOUS CONTINUOUS PRN
Status: DISCONTINUED | OUTPATIENT
Start: 2025-03-26 | End: 2025-03-26

## 2025-03-26 RX ORDER — MIDAZOLAM HYDROCHLORIDE 1 MG/ML
INJECTION INTRAMUSCULAR; INTRAVENOUS AS NEEDED
Status: DISCONTINUED | OUTPATIENT
Start: 2025-03-26 | End: 2025-03-26

## 2025-03-26 RX ORDER — ALBUMIN HUMAN 250 G/1000ML
SOLUTION INTRAVENOUS CONTINUOUS PRN
Status: DISCONTINUED | OUTPATIENT
Start: 2025-03-26 | End: 2025-03-26

## 2025-03-26 RX ORDER — HYDROMORPHONE HYDROCHLORIDE 0.2 MG/ML
0.2 INJECTION INTRAMUSCULAR; INTRAVENOUS; SUBCUTANEOUS
Status: DISCONTINUED | OUTPATIENT
Start: 2025-03-26 | End: 2025-03-27

## 2025-03-26 RX ORDER — AMOXICILLIN 250 MG
2 CAPSULE ORAL 2 TIMES DAILY
Status: DISCONTINUED | OUTPATIENT
Start: 2025-03-26 | End: 2025-04-01 | Stop reason: HOSPADM

## 2025-03-26 RX ORDER — PROTAMINE SULFATE 10 MG/ML
INJECTION, SOLUTION INTRAVENOUS AS NEEDED
Status: DISCONTINUED | OUTPATIENT
Start: 2025-03-26 | End: 2025-03-26

## 2025-03-26 RX ORDER — PAPAVERINE HYDROCHLORIDE 30 MG/ML
INJECTION INTRAMUSCULAR; INTRAVENOUS AS NEEDED
Status: DISCONTINUED | OUTPATIENT
Start: 2025-03-26 | End: 2025-03-26 | Stop reason: HOSPADM

## 2025-03-26 RX ADMIN — NOREPINEPHRINE BITARTRATE 4 MCG: 1 INJECTION, SOLUTION, CONCENTRATE INTRAVENOUS at 10:19

## 2025-03-26 RX ADMIN — PHENYLEPHRINE-NACL IV SOLUTION 10 MG/250ML-0.9% 0.5 MCG/KG/MIN: 10-0.9/25 SOLUTION at 11:04

## 2025-03-26 RX ADMIN — Medication 0.02 MCG/KG/MIN: at 13:16

## 2025-03-26 RX ADMIN — NOREPINEPHRINE BITARTRATE 8 MCG: 1 INJECTION, SOLUTION, CONCENTRATE INTRAVENOUS at 10:45

## 2025-03-26 RX ADMIN — SUGAMMADEX 235 MG: 100 INJECTION, SOLUTION INTRAVENOUS at 17:45

## 2025-03-26 RX ADMIN — HEPARIN SODIUM 560 ML: 1000 INJECTION, SOLUTION INTRAVENOUS; SUBCUTANEOUS at 11:11

## 2025-03-26 RX ADMIN — NOREPINEPHRINE BITARTRATE 8 MCG: 1 INJECTION, SOLUTION, CONCENTRATE INTRAVENOUS at 09:52

## 2025-03-26 RX ADMIN — SODIUM CHLORIDE, SODIUM GLUCONATE, SODIUM ACETATE, POTASSIUM CHLORIDE AND MAGNESIUM CHLORIDE: 526; 502; 368; 37; 30 INJECTION, SOLUTION INTRAVENOUS at 08:36

## 2025-03-26 RX ADMIN — NITROGLYCERIN 100 MCG: 10 INJECTION INTRAVENOUS at 10:04

## 2025-03-26 RX ADMIN — PHENYLEPHRINE HYDROCHLORIDE 120 MCG: 10 INJECTION INTRAVENOUS at 09:49

## 2025-03-26 RX ADMIN — ROCURONIUM BROMIDE 50 MG: 10 INJECTION INTRAVENOUS at 09:57

## 2025-03-26 RX ADMIN — CEFAZOLIN 2 G: 330 INJECTION, POWDER, FOR SOLUTION INTRAMUSCULAR; INTRAVENOUS at 13:57

## 2025-03-26 RX ADMIN — HYDROMORPHONE HYDROCHLORIDE 0.2 MG: 0.2 INJECTION, SOLUTION INTRAMUSCULAR; INTRAVENOUS; SUBCUTANEOUS at 15:38

## 2025-03-26 RX ADMIN — LIDOCAINE HYDROCHLORIDE 100 MG: 20 INJECTION INTRAVENOUS at 13:10

## 2025-03-26 RX ADMIN — NOREPINEPHRINE BITARTRATE 8 MCG: 1 INJECTION, SOLUTION, CONCENTRATE INTRAVENOUS at 10:22

## 2025-03-26 RX ADMIN — PHENYLEPHRINE HYDROCHLORIDE 120 MCG: 10 INJECTION INTRAVENOUS at 09:41

## 2025-03-26 RX ADMIN — ALBUMIN HUMAN 25 G: 0.05 INJECTION, SOLUTION INTRAVENOUS at 19:56

## 2025-03-26 RX ADMIN — HEPARIN SODIUM 5000 UNITS: 1000 INJECTION INTRAVENOUS; SUBCUTANEOUS at 12:37

## 2025-03-26 RX ADMIN — HEPARIN SODIUM 5000 UNITS: 1000 INJECTION INTRAVENOUS; SUBCUTANEOUS at 13:08

## 2025-03-26 RX ADMIN — PROTAMINE SULFATE 50 MG: 10 INJECTION, SOLUTION INTRAVENOUS at 14:18

## 2025-03-26 RX ADMIN — PHENYLEPHRINE HYDROCHLORIDE 40 MCG: 10 INJECTION INTRAVENOUS at 09:56

## 2025-03-26 RX ADMIN — ROCURONIUM BROMIDE 100 MG: 10 INJECTION INTRAVENOUS at 08:49

## 2025-03-26 RX ADMIN — SODIUM CHLORIDE: 9 INJECTION, SOLUTION INTRAVENOUS at 08:36

## 2025-03-26 RX ADMIN — DEXMEDETOMIDINE HYDROCHLORIDE 0.5 MCG/KG/HR: 400 INJECTION INTRAVENOUS at 19:56

## 2025-03-26 RX ADMIN — PROPOFOL 10 MCG/KG/MIN: 10 INJECTION, EMULSION INTRAVENOUS at 13:21

## 2025-03-26 RX ADMIN — ROCURONIUM BROMIDE 10 MG: 10 INJECTION INTRAVENOUS at 11:45

## 2025-03-26 RX ADMIN — ACETAMINOPHEN 650 MG: 325 TABLET ORAL at 17:34

## 2025-03-26 RX ADMIN — PROPOFOL 150 MG: 10 INJECTION, EMULSION INTRAVENOUS at 08:49

## 2025-03-26 RX ADMIN — CEFAZOLIN 2 G: 330 INJECTION, POWDER, FOR SOLUTION INTRAMUSCULAR; INTRAVENOUS at 09:53

## 2025-03-26 RX ADMIN — MIDAZOLAM HYDROCHLORIDE 2 MG: 2 INJECTION, SOLUTION INTRAMUSCULAR; INTRAVENOUS at 08:25

## 2025-03-26 RX ADMIN — NOREPINEPHRINE BITARTRATE 8 MCG: 1 INJECTION, SOLUTION, CONCENTRATE INTRAVENOUS at 10:59

## 2025-03-26 RX ADMIN — FENTANYL CITRATE 150 MCG: 50 INJECTION, SOLUTION INTRAMUSCULAR; INTRAVENOUS at 08:49

## 2025-03-26 RX ADMIN — FENTANYL CITRATE 100 MCG: 50 INJECTION, SOLUTION INTRAMUSCULAR; INTRAVENOUS at 10:10

## 2025-03-26 RX ADMIN — CLEVIPIDINE 2 MG/HR: 0.5 EMULSION INTRAVENOUS at 23:26

## 2025-03-26 RX ADMIN — SODIUM BICARBONATE 25 MEQ: 84 INJECTION, SOLUTION INTRAVENOUS at 13:00

## 2025-03-26 RX ADMIN — HYDROMORPHONE HYDROCHLORIDE 0.2 MG: 0.2 INJECTION, SOLUTION INTRAMUSCULAR; INTRAVENOUS; SUBCUTANEOUS at 20:26

## 2025-03-26 RX ADMIN — MAGNESIUM SULFATE HEPTAHYDRATE 2 G: 500 INJECTION, SOLUTION INTRAMUSCULAR; INTRAVENOUS at 13:10

## 2025-03-26 RX ADMIN — PROTAMINE SULFATE 15 MG: 10 INJECTION, SOLUTION INTRAVENOUS at 14:28

## 2025-03-26 RX ADMIN — FENTANYL CITRATE 100 MCG: 50 INJECTION, SOLUTION INTRAMUSCULAR; INTRAVENOUS at 09:30

## 2025-03-26 RX ADMIN — PROPOFOL 50 MG: 10 INJECTION, EMULSION INTRAVENOUS at 08:52

## 2025-03-26 RX ADMIN — EPINEPHRINE 8 MCG: 1 INJECTION INTRAMUSCULAR; INTRAVENOUS; SUBCUTANEOUS at 14:24

## 2025-03-26 RX ADMIN — PHENYLEPHRINE HYDROCHLORIDE 80 MCG: 10 INJECTION INTRAVENOUS at 09:59

## 2025-03-26 RX ADMIN — NOREPINEPHRINE BITARTRATE 8 MCG: 1 INJECTION, SOLUTION, CONCENTRATE INTRAVENOUS at 10:35

## 2025-03-26 RX ADMIN — EPINEPHRINE 4 MCG: 1 INJECTION INTRAMUSCULAR; INTRAVENOUS; SUBCUTANEOUS at 11:16

## 2025-03-26 RX ADMIN — NOREPINEPHRINE BITARTRATE 8 MCG: 1 INJECTION, SOLUTION, CONCENTRATE INTRAVENOUS at 11:32

## 2025-03-26 RX ADMIN — INSULIN HUMAN 2 UNITS: 100 INJECTION, SOLUTION PARENTERAL at 11:21

## 2025-03-26 RX ADMIN — NOREPINEPHRINE BITARTRATE 16 MCG: 1 INJECTION, SOLUTION, CONCENTRATE INTRAVENOUS at 11:30

## 2025-03-26 RX ADMIN — ALBUMIN HUMAN: 0.25 SOLUTION INTRAVENOUS at 13:30

## 2025-03-26 RX ADMIN — CLEVIPIDINE 3 MG/HR: 0.5 EMULSION INTRAVENOUS at 15:30

## 2025-03-26 RX ADMIN — NITROGLYCERIN 200 MCG: 10 INJECTION INTRAVENOUS at 11:25

## 2025-03-26 RX ADMIN — INSULIN LISPRO 5 UNITS: 100 INJECTION, SOLUTION INTRAVENOUS; SUBCUTANEOUS at 17:35

## 2025-03-26 RX ADMIN — ASPIRIN 81 MG CHEWABLE TABLET 81 MG: 81 TABLET CHEWABLE at 17:34

## 2025-03-26 RX ADMIN — NOREPINEPHRINE BITARTRATE 16 MCG: 1 INJECTION, SOLUTION, CONCENTRATE INTRAVENOUS at 11:34

## 2025-03-26 RX ADMIN — LIDOCAINE HYDROCHLORIDE 100 MG: 20 INJECTION, SOLUTION INFILTRATION; PERINEURAL at 08:49

## 2025-03-26 RX ADMIN — HYDROMORPHONE HYDROCHLORIDE 0.2 MG: 0.2 INJECTION, SOLUTION INTRAMUSCULAR; INTRAVENOUS; SUBCUTANEOUS at 18:41

## 2025-03-26 RX ADMIN — HYDROMORPHONE HYDROCHLORIDE 0.2 MG: 0.2 INJECTION, SOLUTION INTRAMUSCULAR; INTRAVENOUS; SUBCUTANEOUS at 17:58

## 2025-03-26 RX ADMIN — EPINEPHRINE IN SODIUM CHLORIDE 0.02 MCG/KG/MIN: 16 INJECTION INTRAVENOUS at 11:18

## 2025-03-26 RX ADMIN — NOREPINEPHRINE BITARTRATE 8 MCG: 1 INJECTION, SOLUTION, CONCENTRATE INTRAVENOUS at 10:55

## 2025-03-26 RX ADMIN — PHENYLEPHRINE HYDROCHLORIDE 80 MCG: 10 INJECTION INTRAVENOUS at 08:49

## 2025-03-26 RX ADMIN — INSULIN LISPRO 5 UNITS: 100 INJECTION, SOLUTION INTRAVENOUS; SUBCUTANEOUS at 20:15

## 2025-03-26 RX ADMIN — Medication 10 MG: at 14:08

## 2025-03-26 RX ADMIN — Medication 40 PERCENT: at 15:12

## 2025-03-26 RX ADMIN — ROCURONIUM BROMIDE 5 MG: 10 INJECTION INTRAVENOUS at 13:20

## 2025-03-26 RX ADMIN — PHENYLEPHRINE HYDROCHLORIDE 80 MCG: 10 INJECTION INTRAVENOUS at 09:38

## 2025-03-26 RX ADMIN — Medication 0.02 MCG/KG/MIN: at 10:18

## 2025-03-26 RX ADMIN — SODIUM CHLORIDE 2 UNITS/HR: 9 INJECTION, SOLUTION INTRAVENOUS at 11:21

## 2025-03-26 RX ADMIN — FENTANYL CITRATE 150 MCG: 50 INJECTION, SOLUTION INTRAMUSCULAR; INTRAVENOUS at 10:04

## 2025-03-26 RX ADMIN — NOREPINEPHRINE BITARTRATE 8 MCG: 1 INJECTION, SOLUTION, CONCENTRATE INTRAVENOUS at 10:49

## 2025-03-26 RX ADMIN — ROCURONIUM BROMIDE 50 MG: 10 INJECTION INTRAVENOUS at 09:05

## 2025-03-26 RX ADMIN — INSULIN HUMAN 2 UNITS: 100 INJECTION, SOLUTION PARENTERAL at 12:26

## 2025-03-26 RX ADMIN — HEPARIN SODIUM 24000 UNITS: 1000 INJECTION INTRAVENOUS; SUBCUTANEOUS at 11:07

## 2025-03-26 RX ADMIN — PROTAMINE SULFATE 20 MG: 10 INJECTION, SOLUTION INTRAVENOUS at 13:37

## 2025-03-26 RX ADMIN — TRANEXAMIC ACID 877 MG: 100 INJECTION INTRAVENOUS at 09:25

## 2025-03-26 RX ADMIN — OXYCODONE HYDROCHLORIDE 10 MG: 10 TABLET ORAL at 20:26

## 2025-03-26 SDOH — HEALTH STABILITY: MENTAL HEALTH: CURRENT SMOKER: 1

## 2025-03-26 ASSESSMENT — PAIN - FUNCTIONAL ASSESSMENT
PAIN_FUNCTIONAL_ASSESSMENT: CPOT (CRITICAL CARE PAIN OBSERVATION TOOL)
PAIN_FUNCTIONAL_ASSESSMENT: CPOT (CRITICAL CARE PAIN OBSERVATION TOOL)
PAIN_FUNCTIONAL_ASSESSMENT: 0-10

## 2025-03-26 ASSESSMENT — PAIN SCALES - PAIN ASSESSMENT IN ADVANCED DEMENTIA (PAINAD)
TOTALSCORE: MEDICATION (SEE MAR)
TOTALSCORE: MEDICATION (SEE MAR)

## 2025-03-26 ASSESSMENT — PAIN SCALES - GENERAL
PAINLEVEL_OUTOF10: 0 - NO PAIN
PAIN_LEVEL: 0

## 2025-03-26 NOTE — ANESTHESIA PROCEDURE NOTES
Airway  Date/Time: 3/26/2025 8:56 AM  Urgency: elective    Airway not difficult    Staffing  Performed: GRAY   Authorized by: Solo Shah MD    Performed by: Amena Roberson  Patient location during procedure: OR    Indications and Patient Condition  Indications for airway management: anesthesia and airway protection  Spontaneous Ventilation: absent  Sedation level: deep  Preoxygenated: yes  Patient position: sniffing  MILS not maintained throughout  Mask difficulty assessment: 2 - vent by mask + OA or adjuvant +/- NMBA  No planned trial extubation    Final Airway Details  Final airway type: endotracheal airway      Successful airway: ETT  Cuffed: yes   Successful intubation technique: direct laryngoscopy  Facilitating devices/methods: intubating stylet and cricoid pressure  Endotracheal tube insertion site: oral  Blade: Alfonzo  Blade size: #3  ETT size (mm): 7.0  Cormack-Lehane Classification: grade IIb - view of arytenoids or posterior of glottis only  Placement verified by: chest auscultation and capnometry   Cuff volume (mL): 6  Measured from: lips  ETT to lips (cm): 21  Number of attempts at approach: 1  Ventilation between attempts: none  Number of other approaches attempted: 0    Additional Comments  Atraumatic intubation

## 2025-03-26 NOTE — INTERVAL H&P NOTE
H&P reviewed. The patient was examined and there are no changes to the H&P.    Plan for CABG x3 (LIMA to LAD, SVG to PDA, SVG to OM) and LAAC. Informed consent obtained.     Iesla Lopez MD  Cardiac Surgeon  Division of Cardiac Surgery  [facility]     (2) assistive person

## 2025-03-26 NOTE — ANESTHESIA PROCEDURE NOTES
Peripheral Block    Start time: 3/26/2025 2:40 PM  End time: 3/26/2025 2:50 PM  Reason for block: at surgeon's request and post-op pain management  Staffing  Performed: attending   Authorized by: Solo Shah MD    Performed by: Solo Shah MD  Preanesthetic Checklist  Completed: patient identified, IV checked, site marked, risks and benefits discussed, surgical consent, monitors and equipment checked, pre-op evaluation and timeout performed   Timeout performed at: 3/26/2025 2:39 PM  Peripheral Block  Patient position: laying flat  Prep: ChloraPrep  Patient monitoring: heart rate, cardiac monitor and continuous pulse ox  Block type: TAP and serratus anterior  Laterality: B/L  Injection technique: single-shot  Guidance: Doppler guided  Local infiltration: ropivacaine (0.25% ropivacaine used)  Dose: 60 mL  Needle  Needle type: short-bevel   Needle gauge: 21 G  Needle length: 8 cm  Needle localization: ultrasound guidance     image stored in chart  Assessment  Injection assessment: negative aspiration for heme, incremental injection and local visualized surrounding nerve on ultrasound  Paresthesia pain: none  Heart rate change: no  Slow fractionated injection: yes  Additional Notes  Immediately after surgery completed, lateral thoracic walls and anterolateral upper abdominal walls sterilely prepped with Chloraprep.  Ultrasound probe with sterile sheath used to visualize sites of concern.  First, Left-sided truncal SAP block performed with 20 mL 0.25% ropivacaine.  Next, Left-sided subcostal TAP block performed with 10 mL 0.25% ropivacaine.  Then, attention turned to Right side -- Right truncal SAP and subcostal TAP blocks performed in similar manner.  Needle withdrawn, all blocks performed atraumatically, with no complications noted.

## 2025-03-26 NOTE — BRIEF OP NOTE
Date: 3/26/2025  OR Location: [facility] OR    Name: Cornelia Macias, : 1960, Age: 64 y.o., MRN: [id number], Sex: female    Diagnosis  Pre-op Diagnosis      * CAD, multiple vessel [I25.10] Post-op Diagnosis     * CAD, multiple vessel [I25.10]     Procedures  CABGX 3-4, LIMA-LAD, SVG-OM, SVG-PDA, KATHERINE CLIP 40MM ATRICLP, POSTERIOR PERICARDIAL WINDOW, RIGHT ENDOVASCULAR SVG HARVEST  [id number] - MI CORONARY ARTERY BYP W/VEIN & ARTERY GRAFT 2 VEIN  Median sternotomy  2. Right thigh endoscopic saphenous vein harvest  3. Central cannulation  4. CABG x 3: LIMA to LAD, SVG to PDA, and SVG to OM  5. Left atrial appendage ligation with a size 40 AtriClip  6. Posterior pericardial window  7. Sternal approximation with regular sternal wires     Chest Tubes/Drains: Bilateral pleural/ 1 mediastinal       Temporary Pacing Wires: Atrial and ventricular    -Settings:NA   -Underlying Rhythm:NSR    Permanent pacer/ICD: No   -Preoperative settings:    -Intra-op/ Postoperative settings:    Sternotomy performed by: Dr. John Md    Conduit Harvested by: Giovana POSADAS     Sternal Wires placed by: Derek JARAMILLO     Arm/Leg/Groin Closure/Cutdown performed by: leg closure: Giovana SA-ZENA     Cardio Pulmonary Bypass Time: 99 minutes  Cross-clamp Time: 90 minutes  Circulatory Arrest: No Time:     Is patient candidate for Emergency Re-sternotomy? Yes   -If yes, POD #10 is -  25    Surgeons      * Isela Lopez - Primary    Resident/Fellow/Other Assistant:  Surgeons and Role:  * No surgeons found with a matching role *  Giovana JARAMILLO   Staff:   Circulator: Mahsa Hlem Person: Aurelia  Surgical Assistant: Andrea  Surgical Assistant: Sheeba Paz Circulator: Neema  Surgical Assistant: Esau Paz Scrub: Marbin    Anesthesia Staff: Anesthesiologist: Solo Shah MD  CRNA: Gilson Alejandra, APRN-CRNA; Stefanie Leone APRN-CRNA  SRNA: Amena Roberson  Perfusionist: Jerry Arzola  Frontline Breaker: Stefanie Leone  APRN-CRNA    Procedure Summary  Anesthesia: General  ASA: IV  Estimated Blood Loss: 250mL  Intra-op Medications:   Administrations occurring from 0750 to 1515 on 03/26/25:   Medication Name Total Dose   papaverine injection 300 mg   vancomycin (Vancocin) vial for injection 4 g   sodium chloride 0.9 % irrigation solution 4,000 mL   heparin 1,000 unit/mL injection 44,000 Units   albumin human 25 % 437.5 mL   aspirin EC tablet 81 mg Cannot be calculated   carvedilol (Coreg) tablet 25 mg Cannot be calculated   ceFAZolin (Ancef) vial 1 g 4 g   cholecalciferol (Vitamin D-3) tablet 1,000 Units Cannot be calculated   electrolyte-A (Plasmalyte-A) solution Cannot be calculated   EPINEPHrine (Adrenalin) 16 mcg/mL syringe - compounded 12 mcg   EPINEPHrine (Adrenalin) 4 mg in sodium chloride 0.9% 250 mL (16 mcg/mL) infusion (premix) 0.27 mg   fentaNYL (Sublimaze) injection 50 mcg/mL 500 mcg   heparin (porcine) injection 5,000 Units Cannot be calculated   hydrALAZINE (Apresoline) tablet 50 mg Cannot be calculated   insulin lispro injection 0-5 Units Cannot be calculated   insulin regular (HumuLIN R,NovoLIN R) 100 Units in sodium chloride 0.9% 100 mL (1 Units/mL) infusion 6.15 Units   insulin regular (HumuLIN R,NovoLIN R) injection 4 Units   isosorbide mononitrate ER (Imdur) 24 hr tablet 30 mg Cannot be calculated   lidocaine (cardiac) injection 2% prefilled syringe 100 mg   lidocaine (Xylocaine) injection 2 % 100 mg   magnesium sulfate 50 % injection 2 g   methadone (Dolophine) injection 10 mg   midazolam PF (Versed) injection 1 mg/mL 2 mg   nitroglycerin 100 mcg/mL D5W intracoronary syringe - compounded 300 mcg   norepinephrine (Levophed) 8 mg in sodium chloride 0.9% 250 mL (0.032 mg/mL) infusion (premix) 0.38 mg   norepinephrine (Levophed) 16 mcg/mL syringe for Anesthesia 100 mcg   perfusion prime builder 560 mL   phenylephrine (Sohail-Synephrine) 10 mg in sodium chloride 0.9% 250 mL (0.04 mg/mL) infusion (premix) 0.8 mg    phenylephrine (Sohail-Synephrine) injection 520 mcg   polyethylene glycol (Glycolax, Miralax) packet 17 g Cannot be calculated   propofol (Diprivan) injection 10 mg/mL 302.38 mg   protamine injection 85 mg   rocuronium (ZeMuron) 50 mg/5 mL injection 215 mg   sodium bicarbonate injection 1 mEq/mL 25 mEq   sodium chloride 0.9 % infusion 199.5 mL   tranexamic acid (Cyklokapron) 5,000 mg in sodium chloride 0.9% 250 mL (20 mg/mL) infusion 1,190.95 mg              Anesthesia Record               Intraprocedure I/O Totals          Intake    Norepinephrine Drip 0.00 mL    The total shown is the total volume documented since Anesthesia Start was filed.    Tranexamic Acid 0.00 mL    The total shown is the total volume documented since Anesthesia Start was filed.    Insulin Drip 0.00 mL    The total shown is the total volume documented since Anesthesia Start was filed.    Epinephrine Drip 0.00 mL    The total shown is the total volume documented since Anesthesia Start was filed.    Phenylephrine Drip 0.00 mL    The total shown is the total volume documented since Anesthesia Start was filed.    perfusion prime builder 560.00 mL    Cell Saver 988 mL    Total Intake 1548 mL       Output    Urine 320 mL    Total Output 320 mL       Net    Net Volume 1228 mL          Specimen: No specimens collected               Findings: CAD    Complications:  None; patient tolerated the procedure well.     Disposition: ICU - intubated and hemodynamically stable.  Condition: stable  Specimens Collected: No specimens collected  Attending Attestation: I was present for the entire procedure.    Isela Lopez  Phone Number: [phone]

## 2025-03-26 NOTE — ANESTHESIA PROCEDURE NOTES
Arterial Line:    Date/Time: 3/26/2025 8:40 AM    Staffing  Performed: CRNA and LORENZO   Authorized by: Solo Shah MD    Performed by: Amena Roberson    An arterial line was placed. Procedure performed using ultrasound guidance.in the OR for the following indication(s): continuous blood pressure monitoring and blood sampling needed.    A 20 gauge (size), 12 cm (length), Arrow (type) catheter was placed into the Left brachial artery, secured by Tegaderm,   Seldinger technique used.  Events:  patient tolerated procedure well with no complications.      Additional notes:  FIRST ATTEMPT BY lorenzo unsuccessfull, second by crna successful -- line insertion atraumatic, no complications noted.       images stored in chart           The recording was initiated after a verbal consent was obtained from the patient to record this visit for documentation in their clinical record.

## 2025-03-26 NOTE — OP NOTE
Coronary Artery Bypass Grafting x3 (LIMA to LAD, SVG to PDA, SVG to OM); Left Atrial Appendage Clip; Posterior Pericardial Window   Operative Note     Date: 3/26/2025  OR Location: [facility] OR    Name: Cornelia Macias, : 1960, Age: 64 y.o., MRN: [id number], Sex: Female    Diagnosis  Pre-op Diagnosis      * Multivessel coronary artery disease Post-op Diagnosis     * Multivessel coronary artery disease     Procedures  Coronary artery bypass grafting x3              In situ left internal mammary artery to left anterior descending coronary artery bypass               Greater saphenous vein to posterior descending coronary artery bypass               Greater saphenous vein to obtuse marginal coronary artery bypass   Endoscopic harvest of right greater saphenous vein   Medistim flow probe analysis of bypass grafts   Left atrial appendage clip with 40mm AtriClip  Posterior pericardial window    Surgeons      * Isela Lopez MD - Primary    Resident/Fellow/Other Assistant:     * Andrea JARAMILLO - Assisting      * Sheeba POSADAS - Assisting      * Mahamed Ross CSA - Assisting     Staff:   Circulator: Mahsa Helm Person: Aurelia  Surgical Assistant: Andrea  Surgical Assistant: Sheeba Paz Circulator: Neema  Surgical Assistant: Esau Paz Scrub: Marbin    Anesthesia Staff: Anesthesiologist: Solo Shah MD  CRNA: KANDY Enamorado; KANDY Cramer  SRNA: Amena Roberson  Perfusionist: Jerry Arzola  Frontline Breaker: KANDY Cramer    Procedure Summary  Anesthesia: General  ASA: IV  Estimated Blood Loss: 250mL  Intra-op Medications:   Administrations occurring from 0750 to 1515 on 25:   Medication Name Total Dose   papaverine injection 300 mg   vancomycin (Vancocin) vial for injection 4 g   sodium chloride 0.9 % irrigation solution 4,000 mL   heparin 1,000 unit/mL injection 44,000 Units   cholecalciferol (Vitamin D-3) tablet 1,000 Units Cannot be calculated    oxygen (O2) therapy 120 percent   acetaminophen (Tylenol) tablet 975 mg Cannot be calculated   albumin human 25 % 429.17 mL   aspirin EC tablet 81 mg Cannot be calculated   atorvastatin (Lipitor) tablet 40 mg Cannot be calculated   carvedilol (Coreg) tablet 25 mg Cannot be calculated   ceFAZolin (Ancef) vial 1 g 4 g   dextrose 50 % injection 12.5 g Cannot be calculated   dextrose 50 % injection 25 g Cannot be calculated   electrolyte-A (Plasmalyte-A) solution Cannot be calculated   EPINEPHrine (Adrenalin) 16 mcg/mL syringe - compounded 12 mcg   EPINEPHrine (Adrenalin) 4 mg in sodium chloride 0.9% 250 mL (16 mcg/mL) infusion (premix) 0.27 mg   fentaNYL (Sublimaze) injection 50 mcg/mL 500 mcg   glucagon (Glucagen) injection 1 mg Cannot be calculated   glucagon (Glucagen) injection 1 mg Cannot be calculated   heparin (porcine) injection 5,000 Units Cannot be calculated   hydrALAZINE (Apresoline) tablet 50 mg Cannot be calculated   insulin lispro injection 0-5 Units Cannot be calculated   insulin regular (HumuLIN R,NovoLIN R) 100 Units in sodium chloride 0.9% 100 mL (1 Units/mL) infusion 6.15 Units   insulin regular (HumuLIN R,NovoLIN R) injection 4 Units   isosorbide mononitrate ER (Imdur) 24 hr tablet 30 mg Cannot be calculated   lidocaine (cardiac) injection 2% prefilled syringe 100 mg   lidocaine (Xylocaine) injection 2 % 100 mg   magnesium sulfate 50 % injection 2 g   melatonin tablet 5 mg Cannot be calculated   methadone (Dolophine) injection 10 mg   midazolam PF (Versed) injection 1 mg/mL 2 mg   nitroglycerin 100 mcg/mL D5W intracoronary syringe - compounded 300 mcg   norepinephrine (Levophed) 8 mg in sodium chloride 0.9% 250 mL (0.032 mg/mL) infusion (premix) 0.37 mg   norepinephrine (Levophed) 16 mcg/mL syringe for Anesthesia 100 mcg   perflutren lipid microspheres (Definity) injection 0.5-10 mL of dilution Cannot be calculated   perfusion prime builder 560 mL   phenylephrine (Sohail-Synephrine) 10 mg in sodium  chloride 0.9% 250 mL (0.04 mg/mL) infusion (premix) 0.77 mg   phenylephrine (Sohail-Synephrine) injection 520 mcg   polyethylene glycol (Glycolax, Miralax) packet 17 g Cannot be calculated   propofol (Diprivan) injection 10 mg/mL 313.23 mg   protamine injection 85 mg   rocuronium (ZeMuron) 50 mg/5 mL injection 215 mg   sodium bicarbonate injection 1 mEq/mL 25 mEq   sodium chloride 0.9 % infusion 199.26 mL   tranexamic acid (Cyklokapron) 5,000 mg in sodium chloride 0.9% 250 mL (20 mg/mL) infusion 1,166.58 mg              Anesthesia Record               Intraprocedure I/O Totals          Intake    plasma-lyte-A IV solution 1000.00 mL    Norepinephrine Drip 0.00 mL    The total shown is the total volume documented since Anesthesia Start was filed.    Tranexamic Acid 59.45 mL    The total shown is the total volume documented since Anesthesia Start was filed.    Insulin Drip 6.15 mL    The total shown is the total volume documented since Anesthesia Start was filed.    Epinephrine Drip 16.67 mL    The total shown is the total volume documented since Anesthesia Start was filed.    Phenylephrine Drip 0.00 mL    The total shown is the total volume documented since Anesthesia Start was filed.    sodium chloride 0.9% 250.00 mL    albumin human 25% 250.00 mL    perfusion prime builder 560.00 mL    Cell Saver 988 mL    Total Intake 3130.27 mL       Output    Urine 320 mL    Est. Blood Loss 250 mL    Total Output 570 mL       Net    Net Volume 2560.27 mL          Specimen: No specimens collected     Drains and/or Catheters:   Chest Tube 2 Pleural (Active)   Output (mL) 10 mL 03/26/25 1645       Chest Tube 1 Mediastinal (Active)   Output (mL) 15 mL 03/26/25 1645       Urethral Catheter Non-latex 14 Fr. (Active)   Site Assessment Clean;Skin intact 03/26/25 1510   Collection Container Urometer 03/26/25 1510   Securement Method Securing device (Describe) 03/26/25 1510   Reason for Continuing Urinary Catheterization accurate hourly  measurement of urine volume in a critically ill patient that cannot be assessed by other volumes and urine collection strategies 03/26/25 1510   Output (mL) 5 mL 03/26/25 1645     Implants:  Implants       Type Name Action Serial No.      Other Cardiac Implant ATRICLIP, FLEX-V DEVICE, 40MM STERILE, ACHV40 - [id number] Implanted             Findings:  The left internal mammary artery was a good conduit, approximately 2mm in diameter with minimal calcification in the wall. The greater saphenous vein was good conduit, approximately 3mm in diameter.   The LAD was approximately 2mm with calcification in the walls. The middle third was grafted. The PDA and OM were approximately 2mm with calcification in the walls.   Normal biventricular function pre- and post-bypass.       Chest Tubes/Drains:   28 Citizen [place] bilateral pleural Lalo drains   28 Citizen [place] mediastinal chest tube        Temporary Pacing Wires: Atrial and Ventricular    -Settings: VVI back up at 50   -Underlying Rhythm: Normal sinus rhythm    Permanent pacer/ICD: No    Sternotomy performed by: Isela Lopez MD    Conduit Harvested by: Sheeba POSADAS    Sternal Wires placed by: Andrea JARAMILLO    Leg Closure performed by: Sheeba POSADAS    Cardio Pulmonary Bypass Time: 99 minutes  Cross-clamp Time: 90 minutes  Circulatory Arrest: No     Is patient candidate for Emergency Re-sternotomy? Yes   -If yes, POD #10 is - 4/5/25    Indications: Cornelia Mo is a 64-year-old female with multivessel coronary artery disease. She presents now for coronary artery bypass grafting.      The patient was seen in the preoperative area. The risks, benefits, complications, treatment options, non-operative alternatives, expected recovery and outcomes were discussed with the patient. The possibilities of reaction to medication, pulmonary aspiration, injury to surrounding structures, bleeding, recurrent infection, the need for additional procedures, failure to diagnose a  condition, and creating a complication requiring transfusion or operation were discussed with the patient. The patient concurred with the proposed plan, giving informed consent. The site of surgery was properly noted per policy. Preoperative antibiotics were ordered and given within 1 hour of incision. Venous thrombosis prophylaxis was not indicated.      Procedure Details:  After informed consent and patient identification, the patient was brought to the operating room. She was positioned supine and an arterial monitoring line was placed. General anesthesia was induced and the patient was intubated. A LAMONT probe was placed in the esophagus and central venous access was obtained. The surgical site was prepped and draped in sterile fashion.      A surgical time-out was performed verifying the correct patient, procedure, timing and dosage of antibiotics, availability of blood, beta blocker administration, fire risk and instrument sterility prior to beginning the case.      CANNULATION AND INITIATION OF CARDIOPULMONARY BYPASS   A midline incision was made on the chest. Bovie electrocautery was used to dissect down to the anterior table of the sternum. A reciprocating saw was used to make a sternotomy. The left internal mammary artery was harvested in skeletonized fashion. The patient was heparinized and the left internal mammary artery was divided distally. Concomitantly, right greater saphenous vein was harvested using endoscopic technique. The pericardium was divided and reflected laterally to create a well. The patient was systemically heparinized to an ACT of > 400 seconds. The distal ascending aorta and right atrium were cannulated. An indirect retrograde cardioplegia catheter was placed into the coronary sinus and an antegrade cardioplegia catheter was placed in the ascending aorta. Cardiopulmonary bypass was initiated. The aorta was cross clamped and cardioplegia was initiated. The heart was arrested in diastole  with cold cardioplegia. The heart received cardioplegia at 15 to 20 minute intervals in antegrade fashion down the antegrade cardioplegia catheter and conduit grafts as well as in retrograde fashion down the coronary sinus.      SURGICAL CORONARY GRAFTING   The posterior descending coronary artery was isolated. An arteriotomy was created and elongated with Flores scissors. The greater saphenous vein was then anastomosed end to side using 7-0 Prolene suture in running fashion. This was tested and found to be hemostatic.     An aortotomy was created in the proximal ascending aorta and enlarged with a 4mm punch. The greater saphenous vein anastomosed distally to the posterior descending coronary artery was anastomosed end to side proximally with 6-0 Prolene suture in running fashion.     The obtuse marginal coronary artery was isolated. An arteriotomy was created and elongated with Flores scissors. The greater saphenous vein was then anastomosed end to side using 7-0 Prolene suture in running fashion. This was tested and found to be hemostatic.      LEFT ATRIAL APPENDAGE CLIP   A 40mm AtriClip was applied across the base of the left atrial appendage.     SURGICAL CORONARY GRAFTING, CONTINUED   An aortotomy was created in the proximal ascending aorta and enlarged with a 4mm punch. The greater saphenous vein anastomosed distally to the obtuse marginal coronary artery was anastomosed end to side proximally with 6-0 Prolene suture in running fashion.     The left anterior descending artery was isolated. An arteriotomy was created and elongated with Flores scissors. The in situ left internal mammary artery was anastomosed to the left anterior descending coronary artery end to side using 7-0 Prolene suture in running fashion. This was tested and found to be hemostatic.      Warm blood cardioplegia was given retrograde via the retrograde cardioplegia catheter. The patient was placed in Trendelenberg position. The heart was  deaired and the aortic cross clamp was removed with the root vent on.      POSTERIOR PERICARDIAL WINDOW CREATION  Bovie electrocautery was used to create a window in the posterior pericardium.     DECANNULATION AND CLOSURE   The patient was weaned from cardiopulmonary bypass and the retrograde cardioplegia catheter was removed. The venous cannula was then removed. Once appropriate deairing was confirmed by LAMONT, the root vent was removed. Protamine was administered and the aortic cannula was removed. Appropriate flow and pulsatility index was confirmed for all bypass grafts using the Medistim flow probe. Atrial and ventricular epicardial pacing wires were placed. Hemostasis was achieved throughout the mediastinum. 28 [place] Lalo drains were placed in the bilateral pleural spaces and a 28 [place] chest tube was placed in the mediastinum.      The sternum was re-approximated using stainless steel wires in interrupted fashion. The fascia was closed with 0 Vicryl suture in running fashion. The subcutaneous layer was closed with 2-0 Vicryl suture in running fashion. The subcuticular layer was closed with 3-0 Monocryl suture in running fashion. Dermabond was placed as a biologic dressing.      The needle, instrument, and sponge counts were correct x2. The patient was transferred to the CTICU in stable condition.    Complications:  None; patient tolerated the procedure well.    Disposition: ICU - intubated and hemodynamically stable.  Condition: Stable     Task Performed by RNFA or Surgical Assistant:  Endoscopic harvest of right greater saphenous vein, assistance with coronary artery bypass grafting, and chest closure    Additional Details: None    Attending Attestation: I performed the procedure.    Isela Lopez MD  Phone Number: [phone]

## 2025-03-26 NOTE — H&P
CTICU History & Physical    Subjective   HPI:  Ms. Cornelia Macias is a 64 y.o. female history notable for CHF, HLD, DM2, CAD, depression, Hepatitis C tx Mavyret 2018 with SVR (hepatitis C is cured, with last RNA in 2021 undetectable), who presented at as a direct admission for a CABG evaluation.      The patient was found to have coronary artery disease back in 10/23 (mid LAD 85% + 90%; Diag#1 75%, midCirc 65%, midRCA 80%) planned for CABG then, but patient opted for medical management. Now, the patient states she has been more fatigud than normal. Denies chest pain, SOB, LH, dizziness, palpitations, syncope, n/v, and abdominal pain. Follows Dr. Amador, cardiologist at . Repeat LHC on 3/21/25: prox LAD 85%, prox 1st diag 80%, mid circ 75%, OM1 90%, mid RCA 70%. Now s/p CABGx3 (LIMA-LAD, SVG-OM, SVG-PDA), KATHERINE clip, posterior pericardial window on 3/26 with Dr. Lopez.    Cardiac Testing:     TTE 3/19/25:  CONCLUSIONS:   1. Left ventricular ejection fraction is low normal, by visual estimate at 50-55%.   2. Spectral Doppler shows a Grade I (impaired relaxation pattern) of left ventricular diastolic filling with normal left atrial filling pressure.   3. There is normal right ventricular global systolic function.   4. Right ventricular systolic pressure is within normal limits.    LHC:  10/23 (mid LAD 85% + 90%; Diag#1 75%, midCirc 65%, midRCA 80%)  3/21/25: prox LAD 85%, prox 1st diag 80%, mid circ 75%, OM1 90%, mid RCA 70%.     Procedure/Surgeon: CABGx3/Dr. Lopez  Frontliner/Anesthesia: MD Alyssa  Out of OR Time (document on ventilator card): 8254     OR Course/Issues: none     CPB time: 11:40 to 13:19  Cross clamp time: 11:42 to 13:12  Circ arrest time: none  Echo Pre/Post: EF 50-55%, normal RV  Chest Tubes/Drains: b/l pleural, 1x mediastinal   Temporary wires location/setting: A/V wires, not pacing     Fluids  Crystalloid: 1000mL  Colloid: 250mL  Cellsaver: 490mL  Products: 2u pRBC  EBL: 250mL per surgery  UOP:  320mL     Anesthesia  Intubation: ETT 7.0, Mac 3, 2b view, 21cm at teeth, 1 attempt  Intravenous Access: 3x PIV, RIJ MAC w MiniMAC, L brachial A line   Regional anesthesia: SAP, TAP  Benzodiazepine dose/last administration: 2mg midazolam total  Opioid dose/last administration: 500mcg fentanyl, 10mg methadone at 2pm  NMB dose/last administration: 200mg rocuronium total  TOF/ reversal given: no  Antibiotic time: 4g, 0950+1350  Temperature on admission to ICU: 35.7    Past Medical History:   Diagnosis Date    CHF (congestive heart failure)     Coronary artery disease     Hyperlipidemia     Hypertension     Type 2 diabetes mellitus      Past Surgical History:   Procedure Laterality Date    CARDIAC CATHETERIZATION N/A 3/21/2025    Procedure: Left Heart Cath;  Surgeon: Hussain Musa MD;  Location: [place] [address] Cardiac Cath Lab;  Service: Cardiovascular;  Laterality: N/A;     Medications Prior to Admission   Medication Sig Dispense Refill Last Dose/Taking    aspirin 81 mg EC tablet Take 1 tablet (81 mg) by mouth once daily.       atorvastatin (Lipitor) 20 mg tablet Take 1 tablet (20 mg) by mouth once daily. 90 tablet 1     carvedilol (Coreg) 12.5 mg tablet Take 1 tablet (12.5 mg) by mouth 2 times a day. 180 tablet 1     cholecalciferol (Vitamin D3) 25 MCG (1000 UT) tablet Take 1 tablet (1,000 Units) by mouth once daily. 90 tablet 1     isosorbide mononitrate ER (Imdur) 30 mg 24 hr tablet Take 1 tablet (30 mg) by mouth once daily. 90 tablet 1     Jardiance 10 mg Take 1 tablet (10 mg) by mouth once daily. 30 tablet 2     magnesium glycinate 100 mg magnesium capsule Take 1 capsule (100 mg) by mouth once daily.       valsartan (Diovan) 80 mg tablet Take 1 tablet (80 mg) by mouth once daily. 90 tablet 1      Lisinopril  Social History     Tobacco Use    Smoking status: Every Day     Types: Cigarettes    Smokeless tobacco: Never   Substance Use Topics    Alcohol use: Not Currently    Drug use: Never     No family  history on file.    Review of Systems:  Unable to obtain as patient is intubated and sedated    Objective   Vitals:  Most Recent:  Vitals:    03/26/25 0627   BP: 178/76   Pulse: 75   Resp: 18   Temp: 36.9 °C (98.4 °F)   SpO2: 100%       24hr Min/Max:  Temp  Min: 36.4 °C (97.5 °F)  Max: 37.3 °C (99.1 °F)  Pulse  Min: 74  Max: 96  BP  Min: 99/59  Max: 178/76  Resp  Min: 16  Max: 18  SpO2  Min: 98 %  Max: 100 %    I/O:  I/O last 2 completed shifts:  In: 240 (4.1 mL/kg) [P.O.:240]  Out: 550 (9.4 mL/kg) [Urine:550 (0.4 mL/kg/hr)]  Weight: 58.5 kg     LDA:  CVC 03/26/25 Double lumen Right Internal jugular (Active)   Placement Date/Time: 03/26/25 (c) 0930   Hand Hygiene Performed Prior to CVC Insertion: Yes  Site Prep: Chlorhexidine   Site Prep Agent has Completely Dried Before Insertion: Yes  All 5 Sterile Barriers Used (Gloves, Gown, Cap, Mask, Large Sterile Alissa...   Number of days: 0       Arterial Line 03/26/25 Left Radial (Active)   Placement Date/Time: 03/26/25 (c) 0840   Size: 20 G  Orientation: Left  Location: Radial  Securement Method: Transparent dressing  Patient Tolerance: Tolerated well   Number of days: 0       ETT  7 mm (Active)   Placement Date/Time: 03/26/25 (c) 0856   Mask Ventilation: Vent by mask + OA or adjuvant +/- NMBA  Technique: Direct laryngoscopy  ETT Type: ETT - single  Single Lumen Tube Size: 7 mm  Cuffed: Yes  Laryngoscope: Alfonzo  Blade Size: 3  Location: Ora...   Number of days: 0       Urethral Catheter Non-latex 14 Fr. (Active)   Placement Date/Time: 03/26/25 0905   Placed by: ARNOL JACK  Hand Hygiene Completed: Yes  Catheter Type: Non-latex  Tube Size (Fr.): 14 Fr.  Catheter Balloon Size: 10 mL  Urine Returned: Yes   Number of days: 0       Physical Exam:   - CONSTITUTION: pt intubated and sedated  - NEUROLOGIC: pt intubated and sedated  - CARDIOVASCULAR: HRRR, on epi  - RESPIRATORY: CTA b/l, intubated and on ventilator  - GI: soft, no masses  - : patino, straw colored urine  -  EXTREMITIES: warm, well perfused  - SKIN: warm, dry  - PSYCHIATRIC: pt intubated and sedated    Lab Review:  Results from last 7 days   Lab Units 03/26/25  0607   WBC AUTO x10*3/uL 5.4   HEMOGLOBIN g/dL 9.8*   HEMATOCRIT % 31.7*   PLATELETS AUTO x10*3/uL 165     Results from last 7 days   Lab Units 03/26/25  0607 03/20/25  0814 03/19/25  2110   SODIUM mmol/L 138   < > 143   POTASSIUM mmol/L 4.3   < > 3.9   CHLORIDE mmol/L 107   < > 108*   CO2 mmol/L 20*   < > 26   BUN mg/dL 44*   < > 26*   CREATININE mg/dL 2.39*   < > 2.07*   CALCIUM mg/dL 9.6   < > 9.8   PROTEIN TOTAL g/dL  --   --  7.1   BILIRUBIN TOTAL mg/dL  --   --  0.4   ALK PHOS U/L  --   --  45   ALT U/L  --   --  7   AST U/L  --   --  16   GLUCOSE mg/dL 116*   < > 165*    < > = values in this interval not displayed.     Results from last 7 days   Lab Units 03/26/25  0607   MAGNESIUM mg/dL 2.61*     Results from last 7 days   Lab Units 03/26/25  1152   POCT PH, ARTERIAL pH 7.37*   POCT PCO2, ARTERIAL mm Hg 34*   POCT PO2, ARTERIAL mm Hg 394*   POCT HCO3 CALCULATED, ARTERIAL mmol/L 19.7*   POCT BASE EXCESS, ARTERIAL mmol/L -5.0*       Most recent labs and imaging reviewed.    Daily Risk Screen  Intubated: Post operative from cardiac surgery  Central line: Vasoactive medications  Leon: Critically ill patient    Assessment/Plan   Ms. Cornelia Macias is a 64 y.o. female history notable for CHF, HLD, DM2, CAD, depression, Hepatitis C tx Mavyret 2018 with SVR (hepatitis C is cured, with last RNA in 2021 undetectable), now s/p CABGx3 with Dr. Lopez. Now s/p CABGx3 (LIMA-LAD, SVG-OM, SVG-PDA), KATHERINE clip, posterior pericardial window on 3/26 with Dr. Lopez.    Assessment:     Plan:  NEURO:  PMHx none. Patient is intubated and sedated on propofol infusion. Acute post operative pain.   -->  - Serial neuro and pain assessments   - Continue propofol until NMB reversal, then daily sedation vacation at minimum   - NMB reversal when normothermic and hemodynamically stable.     - Scheduled Tylenol   - PRN oxycodone  - PRN dilaudid for pain   - PT Consult, OOB to chair as tolerated, chair position if not tolerated   - CAM ICU score qshift  - Sleep/wake cycle hygiene     CV:  Patient has a history of CHF, HLD, CAD Is now status post CABGx3 Pre/Post EF: 50-55% Arrived to CTICU on 0.02 epi. Brief period of HTN with MAPs in 130s, responded well to Cleviprex ggt. A/V epicardial wires set VVI @ 50.-->  - Maintain goal MAP 75-95  - Mixed venous and CI Q4H  - Volume resuscitate as clinically indicated  - Maintain epicardial wires set VVI @ 50  - Start statin  - Hold home coreg, Imdur, valsartan     PULM:  PMHx 25 pack year smoker, possible REJI, COPD.  Currently intubated on ventilator. Chest tubes 2x pleural, 1 med.-->  - F/u post op CXR  - Once reversed, wean ventilator settings towards CPAP & extubation   - Wean FiO2 maintaining SpO2 >92%.   - IS q1h and OOB to chair when extubated  - Chest tubes to wall suction.    GI:  PMH Hep C (cured, RNA negative 2021).  OG in place.-->  - Continue PPI until extubated  - NPO, will perform bedside swallow eval post extubation   - Colace/senna BID and miralax BID     :  CSA-SERG Risk Score high.  PMHx CKD4, baseline creatinine 2.0. Creatinine stable post-op. Patino in place and making adequate UOP. -->  - Continue patino catheter for strict I/Os.  - Goal UOP 0.5ml/kg/hr  - RFP as clinically indicated  - Replete electrolytes per CTICU protocol     ENDO:  PMH of DMT2 A1c: 6.5-->  - Maintain BG <180, insulin per CTICU protocol  - Hold home jardiance     HEME:  Acute blood loss anemia and thrombocytopenia.-->    - Monitor drain output volume and characteristics  - CBC, coags, and fibrinogen post op and as clinically indicated  - Start ASA 6hrs post-op for CABG  - If CABG is 2/2 STEMI/unstable angina, will receive Plavix POD2  - SQH tomorrow   - SCDs for DVT prophylaxis.  - Last type and screen: 3/24     ID:  Afebrile, no current indications of infection. MRSA  negative.-->  - Trend temp q4h  - Periop cefazolin x 48hrs     Skin:  No active skin issues.  - preventative Mepilex dressings in place on sacrum and heels  - change preventative Mepilex weekly or more frequently as indicated (when moist/soiled)   - every shift skin assessment per nursing and weekly ICU skin rounds  - moisture barrier to be applied with doyle care  - active skin problems addressed with nursing on daily rounds     Proph:  SCDs  PPI     G:  Line  Right IJ MAC w Minimac placed 3/26/25   Left brachial a-line placed 3/26/25     F: Family: will update at bedside postoperatively.     A,B,C,D,E,F,G: reviewed     Dispo: CTICU care for now.   Code status: Full Code   Emergency contact: Extended Emergency Contact Information  Primary Emergency Contact: QUETA GARZA  Address: [address]  Home Phone: [phone]  Relation: Daughter  Preferred language: English   needed? No      Patient staffed with Dr. Jose Reis, DO PGY1  Department of Anesthesiology      CTICU TEAM PHONE [phone]

## 2025-03-26 NOTE — ANESTHESIA PROCEDURE NOTES
Arterial Line:    Date/Time: 3/26/2025 8:41 AM    Staffing  Performed: CRNA   Authorized by: Solo Shah MD    Performed by: Amena Roberson    An arterial line was placed. Procedure performed using ultrasound guidance.in the ICU for the following indication(s): continuous blood pressure monitoring and blood sampling needed.    A 20 gauge (size), 1 and 3/4 inch (length), Arrow (type) catheter was placed into the Left secured by Tegaderm,   Seldinger technique used.  Events:  patient tolerated procedure well with no complications.

## 2025-03-26 NOTE — ANESTHESIA PROCEDURE NOTES
Central Venous Line:    Date/Time: 3/26/2025 9:30 AM    A central venous line was placed in the OR for the following indication(s): central venous access and CVP monitoring.  Staffing  Performed: SRNA and CRNA   Authorized by: Solo Shah MD    Performed by: LORENZO Enamorado-CRNA    Sterility preparation included the following: provider hand hygiene performed prior to central venous catheter insertion, all 5 sterile barriers used (gloves, gown, cap, mask, large sterile drape) during central venous catheter insertion, antiseptic used during central venous catheter insertion and skin prep agent completely dried prior to procedure.  Medical reason for not performing maximal sterile barrier technique: no  The patient was placed in Trendelenburg position.    Right internal jugular vein was prepped.    The site was prepped with Chlorhexidine.  Size: 9 Fr (8 Fr)   Length: 11.5  Catheter type: introducer   Number of Lumens: double lumen    This catheter was not an oximetric catheter.    During the procedure, the following specific steps were taken: target vein identified, needle advanced into vein and blood aspirated and guidewire advanced into vein.  Seldinger technique used.  Procedure performed using ultrasound guidance.  Sterile gel and probe cover used in ultrasound-guided central venous catheter insertion.    Intravenous verification was obtained by ultrasound, venous blood return and manometry.      Post insertion care included: all ports aspirated, all ports flushed easily, guidewire removed intact, Biopatch applied, line sutured in place and dressing applied.    During the procedure the patient experienced: patient tolerated procedure well with no complications.           images stored in chart    Additional notes:  Central line insertion performed easily/atraumatically, no complications noted.

## 2025-03-26 NOTE — NURSING NOTE
CHG bath completed, patient dressed in hospital gown only, Labs drawn.  Waiting for transport for pickup.

## 2025-03-26 NOTE — CARE PLAN
The patient's goals for the shift include        Problem: Safety - Medical Restraint  Goal: Remains free of injury from restraints (Restraint for Interference with Medical Device)  Outcome: Progressing  Goal: Free from restraint(s) (Restraint for Interference with Medical Device)  Outcome: Progressing     Problem: Meds/Post-op Pain  Goal: Pain controlled to tolerate pain level  Outcome: Progressing  Goal: Tolerates prescribed medication  Outcome: Progressing     Problem: DVT/VTE Prevention/Activity  Goal: Prevent skin breakdown  Outcome: Progressing  Goal: Return to preop oxygenation status  Outcome: Progressing     Problem: Wound care/infection prevention  Goal: No signs of infection in 24 hrs.  Outcome: Progressing  Goal: No unexpected bleeding from incision this shift  Outcome: Progressing     Problem: Diet/fluid balance  Goal: Adequate urinary output  Outcome: Progressing  Goal: Free from nausea/vomiting  Outcome: Progressing

## 2025-03-27 ENCOUNTER — APPOINTMENT (OUTPATIENT)
Dept: RADIOLOGY | Facility: HOSPITAL | Age: 65
DRG: 234 | End: 2025-03-27
Payer: COMMERCIAL

## 2025-03-27 LAB
ABO GROUP (TYPE) IN BLOOD: NORMAL
ALBUMIN SERPL BCP-MCNC: 3.9 G/DL (ref 3.4–5)
ALBUMIN SERPL BCP-MCNC: 4 G/DL (ref 3.4–5)
ANION GAP BLDA CALCULATED.4IONS-SCNC: 12 MMO/L (ref 10–25)
ANION GAP BLDA CALCULATED.4IONS-SCNC: 9 MMO/L (ref 10–25)
ANION GAP SERPL CALC-SCNC: 13 MMOL/L (ref 10–20)
ANION GAP SERPL CALC-SCNC: 14 MMOL/L (ref 10–20)
ANTIBODY SCREEN: NORMAL
BASE EXCESS BLDA CALC-SCNC: -5.1 MMOL/L (ref -2–3)
BASE EXCESS BLDA CALC-SCNC: -5.1 MMOL/L (ref -2–3)
BASOPHILS # BLD AUTO: 0.01 X10*3/UL (ref 0–0.1)
BASOPHILS NFR BLD AUTO: 0.1 %
BODY TEMPERATURE: 37 DEGREES CELSIUS
BODY TEMPERATURE: 37 DEGREES CELSIUS
BUN SERPL-MCNC: 37 MG/DL (ref 6–23)
BUN SERPL-MCNC: 37 MG/DL (ref 6–23)
CA-I BLD-SCNC: 1.15 MMOL/L (ref 1.1–1.33)
CA-I BLD-SCNC: 1.19 MMOL/L (ref 1.1–1.33)
CA-I BLDA-SCNC: 1.19 MMOL/L (ref 1.1–1.33)
CA-I BLDA-SCNC: 1.2 MMOL/L (ref 1.1–1.33)
CALCIUM SERPL-MCNC: 8.3 MG/DL (ref 8.6–10.6)
CALCIUM SERPL-MCNC: 8.5 MG/DL (ref 8.6–10.6)
CHLORIDE BLDA-SCNC: 112 MMOL/L (ref 98–107)
CHLORIDE BLDA-SCNC: 114 MMOL/L (ref 98–107)
CHLORIDE SERPL-SCNC: 109 MMOL/L (ref 98–107)
CHLORIDE SERPL-SCNC: 110 MMOL/L (ref 98–107)
CO2 SERPL-SCNC: 20 MMOL/L (ref 21–32)
CO2 SERPL-SCNC: 22 MMOL/L (ref 21–32)
CREAT SERPL-MCNC: 2.07 MG/DL (ref 0.5–1.05)
CREAT SERPL-MCNC: 2.14 MG/DL (ref 0.5–1.05)
EGFRCR SERPLBLD CKD-EPI 2021: 25 ML/MIN/1.73M*2
EGFRCR SERPLBLD CKD-EPI 2021: 26 ML/MIN/1.73M*2
EOSINOPHIL # BLD AUTO: 0 X10*3/UL (ref 0–0.7)
EOSINOPHIL NFR BLD AUTO: 0 %
ERYTHROCYTE [DISTWIDTH] IN BLOOD BY AUTOMATED COUNT: 15 % (ref 11.5–14.5)
ERYTHROCYTE [DISTWIDTH] IN BLOOD BY AUTOMATED COUNT: 15.3 % (ref 11.5–14.5)
GLUCOSE BLD MANUAL STRIP-MCNC: 100 MG/DL (ref 74–99)
GLUCOSE BLD MANUAL STRIP-MCNC: 128 MG/DL (ref 74–99)
GLUCOSE BLD MANUAL STRIP-MCNC: 147 MG/DL (ref 74–99)
GLUCOSE BLD MANUAL STRIP-MCNC: 148 MG/DL (ref 74–99)
GLUCOSE BLD MANUAL STRIP-MCNC: 93 MG/DL (ref 74–99)
GLUCOSE BLDA-MCNC: 126 MG/DL (ref 74–99)
GLUCOSE BLDA-MCNC: 126 MG/DL (ref 74–99)
GLUCOSE SERPL-MCNC: 121 MG/DL (ref 74–99)
GLUCOSE SERPL-MCNC: 130 MG/DL (ref 74–99)
HCO3 BLDA-SCNC: 20.6 MMOL/L (ref 22–26)
HCO3 BLDA-SCNC: 20.6 MMOL/L (ref 22–26)
HCT VFR BLD AUTO: 26.9 % (ref 36–46)
HCT VFR BLD AUTO: 28.4 % (ref 36–46)
HCT VFR BLD EST: 29 % (ref 36–46)
HCT VFR BLD EST: 29 % (ref 36–46)
HGB BLD-MCNC: 9.2 G/DL (ref 12–16)
HGB BLD-MCNC: 9.3 G/DL (ref 12–16)
HGB BLDA-MCNC: 9.7 G/DL (ref 12–16)
HGB BLDA-MCNC: 9.7 G/DL (ref 12–16)
IMM GRANULOCYTES # BLD AUTO: 0.04 X10*3/UL (ref 0–0.7)
IMM GRANULOCYTES NFR BLD AUTO: 0.4 % (ref 0–0.9)
INHALED O2 CONCENTRATION: 21 %
INHALED O2 CONCENTRATION: 40 %
LACTATE BLDA-SCNC: 0.6 MMOL/L (ref 0.4–2)
LACTATE BLDA-SCNC: 0.6 MMOL/L (ref 0.4–2)
LYMPHOCYTES # BLD AUTO: 1.09 X10*3/UL (ref 1.2–4.8)
LYMPHOCYTES NFR BLD AUTO: 9.9 %
MAGNESIUM SERPL-MCNC: 3.21 MG/DL (ref 1.6–2.4)
MAGNESIUM SERPL-MCNC: 3.47 MG/DL (ref 1.6–2.4)
MCH RBC QN AUTO: 30 PG (ref 26–34)
MCH RBC QN AUTO: 30.7 PG (ref 26–34)
MCHC RBC AUTO-ENTMCNC: 32.4 G/DL (ref 32–36)
MCHC RBC AUTO-ENTMCNC: 34.6 G/DL (ref 32–36)
MCV RBC AUTO: 89 FL (ref 80–100)
MCV RBC AUTO: 93 FL (ref 80–100)
MONOCYTES # BLD AUTO: 1.06 X10*3/UL (ref 0.1–1)
MONOCYTES NFR BLD AUTO: 9.6 %
NEUTROPHILS # BLD AUTO: 8.86 X10*3/UL (ref 1.2–7.7)
NEUTROPHILS NFR BLD AUTO: 80 %
NRBC BLD-RTO: 0 /100 WBCS (ref 0–0)
NRBC BLD-RTO: 0 /100 WBCS (ref 0–0)
OXYHGB MFR BLDA: 97.3 % (ref 94–98)
OXYHGB MFR BLDA: 97.7 % (ref 94–98)
PCO2 BLDA: 40 MM HG (ref 38–42)
PCO2 BLDA: 40 MM HG (ref 38–42)
PH BLDA: 7.32 PH (ref 7.38–7.42)
PH BLDA: 7.32 PH (ref 7.38–7.42)
PHOSPHATE SERPL-MCNC: 4.4 MG/DL (ref 2.5–4.9)
PHOSPHATE SERPL-MCNC: 4.5 MG/DL (ref 2.5–4.9)
PLATELET # BLD AUTO: 92 X10*3/UL (ref 150–450)
PLATELET # BLD AUTO: 95 X10*3/UL (ref 150–450)
PO2 BLDA: 134 MM HG (ref 85–95)
PO2 BLDA: 150 MM HG (ref 85–95)
POTASSIUM BLDA-SCNC: 4.4 MMOL/L (ref 3.5–5.3)
POTASSIUM BLDA-SCNC: 4.6 MMOL/L (ref 3.5–5.3)
POTASSIUM SERPL-SCNC: 4.4 MMOL/L (ref 3.5–5.3)
POTASSIUM SERPL-SCNC: 4.4 MMOL/L (ref 3.5–5.3)
RBC # BLD AUTO: 3.03 X10*6/UL (ref 4–5.2)
RBC # BLD AUTO: 3.07 X10*6/UL (ref 4–5.2)
RH FACTOR (ANTIGEN D): NORMAL
SAO2 % BLDA: 100 % (ref 94–100)
SAO2 % BLDA: 100 % (ref 94–100)
SODIUM BLDA-SCNC: 139 MMOL/L (ref 136–145)
SODIUM BLDA-SCNC: 140 MMOL/L (ref 136–145)
SODIUM SERPL-SCNC: 138 MMOL/L (ref 136–145)
SODIUM SERPL-SCNC: 142 MMOL/L (ref 136–145)
WBC # BLD AUTO: 11.1 X10*3/UL (ref 4.4–11.3)
WBC # BLD AUTO: 8.2 X10*3/UL (ref 4.4–11.3)

## 2025-03-27 PROCEDURE — 2020000001 HC ICU ROOM DAILY

## 2025-03-27 PROCEDURE — 84132 ASSAY OF SERUM POTASSIUM: CPT

## 2025-03-27 PROCEDURE — 83735 ASSAY OF MAGNESIUM: CPT

## 2025-03-27 PROCEDURE — 97530 THERAPEUTIC ACTIVITIES: CPT | Mod: GP

## 2025-03-27 PROCEDURE — 2500000001 HC RX 250 WO HCPCS SELF ADMINISTERED DRUGS (ALT 637 FOR MEDICARE OP)

## 2025-03-27 PROCEDURE — 94003 VENT MGMT INPAT SUBQ DAY: CPT

## 2025-03-27 PROCEDURE — 71045 X-RAY EXAM CHEST 1 VIEW: CPT | Performed by: RADIOLOGY

## 2025-03-27 PROCEDURE — 37799 UNLISTED PX VASCULAR SURGERY: CPT

## 2025-03-27 PROCEDURE — 82330 ASSAY OF CALCIUM: CPT

## 2025-03-27 PROCEDURE — 86901 BLOOD TYPING SEROLOGIC RH(D): CPT

## 2025-03-27 PROCEDURE — 85027 COMPLETE CBC AUTOMATED: CPT

## 2025-03-27 PROCEDURE — 71045 X-RAY EXAM CHEST 1 VIEW: CPT

## 2025-03-27 PROCEDURE — 99231 SBSQ HOSP IP/OBS SF/LOW 25: CPT

## 2025-03-27 PROCEDURE — 92610 EVALUATE SWALLOWING FUNCTION: CPT | Mod: GN | Performed by: SPEECH-LANGUAGE PATHOLOGIST

## 2025-03-27 PROCEDURE — 85025 COMPLETE CBC W/AUTO DIFF WBC: CPT

## 2025-03-27 PROCEDURE — 2500000004 HC RX 250 GENERAL PHARMACY W/ HCPCS (ALT 636 FOR OP/ED)

## 2025-03-27 PROCEDURE — 97116 GAIT TRAINING THERAPY: CPT | Mod: GP

## 2025-03-27 PROCEDURE — 2500000002 HC RX 250 W HCPCS SELF ADMINISTERED DRUGS (ALT 637 FOR MEDICARE OP, ALT 636 FOR OP/ED)

## 2025-03-27 PROCEDURE — 97161 PT EVAL LOW COMPLEX 20 MIN: CPT | Mod: GP

## 2025-03-27 PROCEDURE — 82947 ASSAY GLUCOSE BLOOD QUANT: CPT

## 2025-03-27 PROCEDURE — 2500000004 HC RX 250 GENERAL PHARMACY W/ HCPCS (ALT 636 FOR OP/ED): Mod: JZ,TB

## 2025-03-27 PROCEDURE — 2500000004 HC RX 250 GENERAL PHARMACY W/ HCPCS (ALT 636 FOR OP/ED): Performed by: STUDENT IN AN ORGANIZED HEALTH CARE EDUCATION/TRAINING PROGRAM

## 2025-03-27 PROCEDURE — C9248 INJ, CLEVIDIPINE BUTYRATE: HCPCS | Mod: JZ,TB

## 2025-03-27 PROCEDURE — 99291 CRITICAL CARE FIRST HOUR: CPT | Performed by: STUDENT IN AN ORGANIZED HEALTH CARE EDUCATION/TRAINING PROGRAM

## 2025-03-27 RX ORDER — FUROSEMIDE 10 MG/ML
20 INJECTION INTRAMUSCULAR; INTRAVENOUS ONCE
Status: COMPLETED | OUTPATIENT
Start: 2025-03-27 | End: 2025-03-27

## 2025-03-27 RX ORDER — ACETAMINOPHEN 10 MG/ML
1000 INJECTION, SOLUTION INTRAVENOUS EVERY 6 HOURS
Status: COMPLETED | OUTPATIENT
Start: 2025-03-27 | End: 2025-03-28

## 2025-03-27 RX ORDER — CARVEDILOL 6.25 MG/1
6.25 TABLET ORAL 2 TIMES DAILY
Status: DISCONTINUED | OUTPATIENT
Start: 2025-03-27 | End: 2025-03-28

## 2025-03-27 RX ORDER — HYDRALAZINE HYDROCHLORIDE 20 MG/ML
10 INJECTION INTRAMUSCULAR; INTRAVENOUS EVERY 6 HOURS PRN
Status: DISCONTINUED | OUTPATIENT
Start: 2025-03-27 | End: 2025-03-28

## 2025-03-27 RX ORDER — HYDROMORPHONE HYDROCHLORIDE 0.2 MG/ML
0.2 INJECTION INTRAMUSCULAR; INTRAVENOUS; SUBCUTANEOUS EVERY 2 HOUR PRN
Status: DISCONTINUED | OUTPATIENT
Start: 2025-03-27 | End: 2025-03-28

## 2025-03-27 RX ORDER — HEPARIN SODIUM 5000 [USP'U]/ML
5000 INJECTION, SOLUTION INTRAVENOUS; SUBCUTANEOUS EVERY 8 HOURS
Status: DISCONTINUED | OUTPATIENT
Start: 2025-03-27 | End: 2025-04-01 | Stop reason: HOSPADM

## 2025-03-27 RX ORDER — ASPIRIN 300 MG/1
150 SUPPOSITORY RECTAL DAILY
Status: DISCONTINUED | OUTPATIENT
Start: 2025-03-27 | End: 2025-03-28

## 2025-03-27 RX ORDER — LABETALOL HYDROCHLORIDE 5 MG/ML
10 INJECTION, SOLUTION INTRAVENOUS ONCE
Status: COMPLETED | OUTPATIENT
Start: 2025-03-27 | End: 2025-03-27

## 2025-03-27 RX ORDER — INSULIN LISPRO 100 [IU]/ML
0-15 INJECTION, SOLUTION INTRAVENOUS; SUBCUTANEOUS
Status: DISCONTINUED | OUTPATIENT
Start: 2025-03-27 | End: 2025-03-28

## 2025-03-27 RX ORDER — HYDROMORPHONE HYDROCHLORIDE 0.2 MG/ML
0.2 INJECTION INTRAMUSCULAR; INTRAVENOUS; SUBCUTANEOUS EVERY 2 HOUR PRN
Status: DISCONTINUED | OUTPATIENT
Start: 2025-03-27 | End: 2025-03-27

## 2025-03-27 RX ADMIN — PANTOPRAZOLE SODIUM 40 MG: 40 INJECTION, POWDER, FOR SOLUTION INTRAVENOUS at 06:58

## 2025-03-27 RX ADMIN — CEFAZOLIN SODIUM 2 G: 2 INJECTION, SOLUTION INTRAVENOUS at 14:55

## 2025-03-27 RX ADMIN — ACETAMINOPHEN 1000 MG: 10 INJECTION INTRAVENOUS at 09:14

## 2025-03-27 RX ADMIN — HYDRALAZINE HYDROCHLORIDE 10 MG: 20 INJECTION INTRAMUSCULAR; INTRAVENOUS at 21:25

## 2025-03-27 RX ADMIN — ACETAMINOPHEN 1000 MG: 10 INJECTION INTRAVENOUS at 21:25

## 2025-03-27 RX ADMIN — INSULIN LISPRO 5 UNITS: 100 INJECTION, SOLUTION INTRAVENOUS; SUBCUTANEOUS at 10:32

## 2025-03-27 RX ADMIN — FUROSEMIDE 20 MG: 10 INJECTION, SOLUTION INTRAMUSCULAR; INTRAVENOUS at 15:49

## 2025-03-27 RX ADMIN — CLEVIPIDINE 4 MG/HR: 0.5 EMULSION INTRAVENOUS at 08:44

## 2025-03-27 RX ADMIN — CEFAZOLIN SODIUM 2 G: 2 INJECTION, SOLUTION INTRAVENOUS at 02:00

## 2025-03-27 RX ADMIN — OXYCODONE 5 MG: 5 TABLET ORAL at 00:49

## 2025-03-27 RX ADMIN — HYDROMORPHONE HYDROCHLORIDE 0.2 MG: 0.2 INJECTION, SOLUTION INTRAMUSCULAR; INTRAVENOUS; SUBCUTANEOUS at 10:38

## 2025-03-27 RX ADMIN — HYDRALAZINE HYDROCHLORIDE 10 MG: 20 INJECTION INTRAMUSCULAR; INTRAVENOUS at 15:50

## 2025-03-27 RX ADMIN — HEPARIN SODIUM 5000 UNITS: 5000 INJECTION, SOLUTION INTRAVENOUS; SUBCUTANEOUS at 17:41

## 2025-03-27 RX ADMIN — ACETAMINOPHEN 1000 MG: 10 INJECTION INTRAVENOUS at 14:55

## 2025-03-27 RX ADMIN — HYDROMORPHONE HYDROCHLORIDE 0.5 MG: 1 INJECTION, SOLUTION INTRAMUSCULAR; INTRAVENOUS; SUBCUTANEOUS at 00:49

## 2025-03-27 RX ADMIN — HEPARIN SODIUM 5000 UNITS: 5000 INJECTION, SOLUTION INTRAVENOUS; SUBCUTANEOUS at 09:20

## 2025-03-27 RX ADMIN — ACETAMINOPHEN 650 MG: 325 TABLET ORAL at 00:49

## 2025-03-27 RX ADMIN — INSULIN LISPRO 5 UNITS: 100 INJECTION, SOLUTION INTRAVENOUS; SUBCUTANEOUS at 16:16

## 2025-03-27 RX ADMIN — LABETALOL HYDROCHLORIDE 10 MG: 5 INJECTION INTRAVENOUS at 17:41

## 2025-03-27 RX ADMIN — SODIUM CHLORIDE, SODIUM LACTATE, POTASSIUM CHLORIDE, AND CALCIUM CHLORIDE 500 ML: .6; .31; .03; .02 INJECTION, SOLUTION INTRAVENOUS at 06:17

## 2025-03-27 RX ADMIN — ASPIRIN 150 MG: 300 SUPPOSITORY RECTAL at 16:08

## 2025-03-27 RX ADMIN — HYDROMORPHONE HYDROCHLORIDE 0.4 MG: 1 INJECTION, SOLUTION INTRAMUSCULAR; INTRAVENOUS; SUBCUTANEOUS at 06:55

## 2025-03-27 RX ADMIN — HYDROMORPHONE HYDROCHLORIDE 0.4 MG: 1 INJECTION, SOLUTION INTRAMUSCULAR; INTRAVENOUS; SUBCUTANEOUS at 14:55

## 2025-03-27 ASSESSMENT — PAIN SCALES - GENERAL
PAINLEVEL_OUTOF10: 2
PAINLEVEL_OUTOF10: 9
PAINLEVEL_OUTOF10: 6
PAINLEVEL_OUTOF10: 7
PAINLEVEL_OUTOF10: 7
PAINLEVEL_OUTOF10: 0 - NO PAIN
PAINLEVEL_OUTOF10: 0 - NO PAIN

## 2025-03-27 ASSESSMENT — PAIN - FUNCTIONAL ASSESSMENT
PAIN_FUNCTIONAL_ASSESSMENT: 0-10

## 2025-03-27 ASSESSMENT — PAIN DESCRIPTION - ORIENTATION
ORIENTATION: MID
ORIENTATION: MID

## 2025-03-27 ASSESSMENT — COGNITIVE AND FUNCTIONAL STATUS - GENERAL
STANDING UP FROM CHAIR USING ARMS: A LITTLE
CLIMB 3 TO 5 STEPS WITH RAILING: A LOT
TURNING FROM BACK TO SIDE WHILE IN FLAT BAD: A LITTLE
WALKING IN HOSPITAL ROOM: A LITTLE
MOVING FROM LYING ON BACK TO SITTING ON SIDE OF FLAT BED WITH BEDRAILS: A LITTLE
MOVING TO AND FROM BED TO CHAIR: A LITTLE
MOBILITY SCORE: 17

## 2025-03-27 ASSESSMENT — ACTIVITIES OF DAILY LIVING (ADL)
ADL_ASSISTANCE: INDEPENDENT
ADLS_ADDRESSED: NO

## 2025-03-27 ASSESSMENT — PAIN DESCRIPTION - LOCATION
LOCATION: CHEST
LOCATION: CHEST

## 2025-03-27 NOTE — CONSULTS
Cornelia Macias is a 64 y.o. year old female patient who presents for CABGX 3-4, LIMA-LAD, SVG-OM, SVG-PDA, KATHERINE CLIP 40MM ATRICLP, POSTERIOR PERICARDIAL WINDOW, RIGHT ENDOVASCULAR SVG HARVEST  with Dr. Lopez on 3/26/25. Acute Pain consulted for block for postoperative pain control.     Anticipated Postop Pain Issues -   Palliative: typically relieved with IV analgesics and regional local anesthetics  Provocative: typically with movement  Quality: typically burning and aching  Radiation: typically none  Severity: typically severe 8-10/10  Timing: typically constant    Past Medical History:   Diagnosis Date    CHF (congestive heart failure)     Coronary artery disease     Hyperlipidemia     Hypertension     Type 2 diabetes mellitus         Past Surgical History:   Procedure Laterality Date    CARDIAC CATHETERIZATION N/A 3/21/2025    Procedure: Left Heart Cath;  Surgeon: Hussain Musa MD;  Location: [place] [address] [facility];  Service: Cardiovascular;  Laterality: N/A;        No family history on file.     Social History     Socioeconomic History    Marital status: Single     Spouse name: Not on file    Number of children: Not on file    Years of education: Not on file    Highest education level: Not on file   Occupational History    Not on file   Tobacco Use    Smoking status: Every Day     Types: Cigarettes    Smokeless tobacco: Never   Substance and Sexual Activity    Alcohol use: Not Currently    Drug use: Never    Sexual activity: Not on file   Other Topics Concern    Not on file   Social History Narrative    Not on file     Social Drivers of Health     Financial Resource Strain: Low Risk  (3/20/2025)    Overall Financial Resource Strain (CARDIA)     Difficulty of Paying Living Expenses: Not hard at all   Food Insecurity: No Food Insecurity (3/20/2025)    Hunger Vital Sign     Worried About Running Out of Food in the Last Year: Never true     Ran Out of Food in the Last Year: Never true    Transportation Needs: No Transportation Needs (3/20/2025)    PRAPARE - Transportation     Lack of Transportation (Medical): No     Lack of Transportation (Non-Medical): No   Physical Activity: Inactive (11/20/2023)    Received from [facility]    Exercise Vital Sign     Days of Exercise per Week: 0 days     Minutes of Exercise per Session: 0 min   Stress: Stress Concern Present (11/20/2023)    Received from [facility]    [employer] - Occupational Stress Questionnaire     Feeling of Stress : To some extent   Social Connections: Moderately Isolated (11/20/2023)    Received from [facility]    Social Connection and Isolation Panel [NHANES]     Frequency of Communication with Friends and Family: More than three times a week     Frequency of Social Gatherings with Friends and Family: More than three times a week     Attends [employer] Services: More than 4 times per year     Active Member of Clubs or Organizations: No     Attends Club or Organization Meetings: Never     Marital Status: Never    Intimate Partner Violence: Not At Risk (3/20/2025)    Humiliation, Afraid, Rape, and Kick questionnaire     Fear of Current or Ex-Partner: No     Emotionally Abused: No     Physically Abused: No     Sexually Abused: No   Housing Stability: Low Risk  (3/20/2025)    Housing Stability Vital Sign     Unable to Pay for Housing in the Last Year: No     Number of Times Moved in the Last Year: 0     Homeless in the Last Year: No        Allergies   Allergen Reactions    Lisinopril Cough         Review of Systems  Gen: No fatigue, anorexia, insomnia, fever.   Eyes: No vision loss, double vision, drainage, eye pain.   ENT: No pharyngitis, dry mouth, no hearing changes or ear discharge  Cardiac: No chest pain, palpitations, syncope, near syncope.   Pulmonary: No shortness of breath, cough, hemoptysis.   Heme/lymph: No swollen glands, fever, bleeding.   GI: No abdominal pain, change in bowel habits,  melena, hematemesis, hematochezia, nausea, vomiting, diarrhea.   : No discharge, dysuria, frequency, urgency, hematuria.  Endo: No polyuria or weight loss.   Musculoskeletal: Negative for any pain or loss of ROM/weakness  Skin: No rashes or lesions  Neuro: Normal speech, no numbness or weakness. No gait difficulties  Review of systems is otherwise negative unless stated above or in history of present illness.    Physical Exam:  Constitutional:  no distress, alert and cooperative  Eyes: clear sclera  Head/Neck: No apparent injury, trachea midline  Respiratory/Thorax: Patent airways, thorax symmetric, breathing comfortably  Cardiovascular: no pitting edema  Gastrointestinal: Nondistended  Musculoskeletal: ROM intact  Extremities: no clubbing  Neurological: alert, jorgensen x4  Psychological: Appropriate affect    Results for orders placed or performed during the hospital encounter of 03/19/25 (from the past 24 hours)   POCT GLUCOSE   Result Value Ref Range    POCT Glucose 109 (H) 74 - 99 mg/dL   Blood Gas Arterial Full Panel Unsolicited   Result Value Ref Range    POCT pH, Arterial 7.36 (L) 7.38 - 7.42 pH    POCT pCO2, Arterial 35 (L) 38 - 42 mm Hg    POCT pO2, Arterial 119 (H) 85 - 95 mm Hg    POCT SO2, Arterial 100 94 - 100 %    POCT Oxy Hemoglobin, Arterial 97.5 94.0 - 98.0 %    POCT Hematocrit Calculated, Arterial 29.0 (L) 36.0 - 46.0 %    POCT Sodium, Arterial 138 136 - 145 mmol/L    POCT Potassium, Arterial 4.2 3.5 - 5.3 mmol/L    POCT Chloride, Arterial 110 (H) 98 - 107 mmol/L    POCT Ionized Calcium, Arterial 1.28 1.10 - 1.33 mmol/L    POCT Glucose, Arterial 115 (H) 74 - 99 mg/dL    POCT Lactate, Arterial 0.5 0.4 - 2.0 mmol/L    POCT Base Excess, Arterial -5.1 (L) -2.0 - 3.0 mmol/L    POCT HCO3 Calculated, Arterial 19.8 (L) 22.0 - 26.0 mmol/L    POCT Hemoglobin, Arterial 9.7 (L) 12.0 - 16.0 g/dL    POCT Anion Gap, Arterial 12 10 - 25 mmo/L    Patient Temperature 37.0 degrees Celsius    FiO2 21 %   Coox Panel,  Arterial Unsolicited   Result Value Ref Range    POCT Hemoglobin, Arterial 9.7 (L) 12.0 - 16.0 g/dL    POCT Oxy Hemoglobin, Arterial 97.5 94.0 - 98.0 %    POCT Carboxyhemoglobin, Arterial 1.3 %    POCT Methemoglobin, Arterial 1.2 0.0 - 1.5 %    POCT Deoxy Hemoglobin, Arterial 0.0 0.0 - 5.0 %   Blood Gas Arterial Full Panel Unsolicited   Result Value Ref Range    POCT pH, Arterial 7.31 (L) 7.38 - 7.42 pH    POCT pCO2, Arterial 40 38 - 42 mm Hg    POCT pO2, Arterial 473 (H) 85 - 95 mm Hg    POCT SO2, Arterial 100 94 - 100 %    POCT Oxy Hemoglobin, Arterial 97.7 94.0 - 98.0 %    POCT Hematocrit Calculated, Arterial 26.0 (L) 36.0 - 46.0 %    POCT Sodium, Arterial 135 (L) 136 - 145 mmol/L    POCT Potassium, Arterial 4.8 3.5 - 5.3 mmol/L    POCT Chloride, Arterial 109 (H) 98 - 107 mmol/L    POCT Ionized Calcium, Arterial 1.22 1.10 - 1.33 mmol/L    POCT Glucose, Arterial 215 (H) 74 - 99 mg/dL    POCT Lactate, Arterial 0.5 0.4 - 2.0 mmol/L    POCT Base Excess, Arterial -5.7 (L) -2.0 - 3.0 mmol/L    POCT HCO3 Calculated, Arterial 20.1 (L) 22.0 - 26.0 mmol/L    POCT Hemoglobin, Arterial 8.5 (L) 12.0 - 16.0 g/dL    POCT Anion Gap, Arterial 11 10 - 25 mmo/L    Patient Temperature 37.0 degrees Celsius    FiO2 100 %   Coox Panel, Arterial Unsolicited   Result Value Ref Range    POCT Hemoglobin, Arterial 8.5 (L) 12.0 - 16.0 g/dL    POCT Oxy Hemoglobin, Arterial 97.7 94.0 - 98.0 %    POCT Carboxyhemoglobin, Arterial 0.9 %    POCT Methemoglobin, Arterial 1.4 0.0 - 1.5 %    POCT Deoxy Hemoglobin, Arterial 0.0 0.0 - 5.0 %   Blood Gas Venous Full Panel Unsolicited   Result Value Ref Range    POCT pH, Venous 7.32 (L) 7.33 - 7.43 pH    POCT pCO2, Venous 42 41 - 51 mm Hg    POCT pO2, Venous 46 (H) 35 - 45 mm Hg    POCT SO2, Venous 83 (H) 45 - 75 %    POCT Oxy Hemoglobin, Venous 81.0 (H) 45.0 - 75.0 %    POCT Hematocrit Calculated, Venous 23.0 (L) 36.0 - 46.0 %    POCT Sodium, Venous 135 (L) 136 - 145 mmol/L    POCT Potassium, Venous 6.4  (HH) 3.5 - 5.3 mmol/L    POCT Chloride, Venous 109 (H) 98 - 107 mmol/L    POCT Ionized Calicum, Venous 1.14 1.10 - 1.33 mmol/L    POCT Glucose, Venous 205 (H) 74 - 99 mg/dL    POCT Lactate, Venous 1.2 0.4 - 2.0 mmol/L    POCT Base Excess, Venous -4.2 (L) -2.0 - 3.0 mmol/L    POCT HCO3 Calculated, Venous 21.6 (L) 22.0 - 26.0 mmol/L    POCT Hemoglobin, Venous 7.8 (L) 12.0 - 16.0 g/dL    POCT Anion Gap, Venous 11.0 10.0 - 25.0 mmol/L    Patient Temperature 37.0 degrees Celsius    FiO2 70 %   Coox Panel, Venous Unsolicited   Result Value Ref Range    POCT Carboxyhemoglobin, Venous 1.5 %    POCT Methemoglobin, Venous 0.7 0.0 - 1.5 %   Blood Gas Arterial Full Panel Unsolicited   Result Value Ref Range    POCT pH, Arterial 7.37 (L) 7.38 - 7.42 pH    POCT pCO2, Arterial 34 (L) 38 - 42 mm Hg    POCT pO2, Arterial 394 (H) 85 - 95 mm Hg    POCT SO2, Arterial 100 94 - 100 %    POCT Oxy Hemoglobin, Arterial 97.8 94.0 - 98.0 %    POCT Hematocrit Calculated, Arterial 23.0 (L) 36.0 - 46.0 %    POCT Sodium, Arterial 135 (L) 136 - 145 mmol/L    POCT Potassium, Arterial 6.3 (HH) 3.5 - 5.3 mmol/L    POCT Chloride, Arterial 110 (H) 98 - 107 mmol/L    POCT Ionized Calcium, Arterial 1.15 1.10 - 1.33 mmol/L    POCT Glucose, Arterial 196 (H) 74 - 99 mg/dL    POCT Lactate, Arterial 0.8 0.4 - 2.0 mmol/L    POCT Base Excess, Arterial -5.0 (L) -2.0 - 3.0 mmol/L    POCT HCO3 Calculated, Arterial 19.7 (L) 22.0 - 26.0 mmol/L    POCT Hemoglobin, Arterial 7.8 (L) 12.0 - 16.0 g/dL    POCT Anion Gap, Arterial 12 10 - 25 mmo/L    Patient Temperature 37.0 degrees Celsius    FiO2 70 %   Coox Panel, Arterial Unsolicited   Result Value Ref Range    POCT Hemoglobin, Arterial 7.8 (L) 12.0 - 16.0 g/dL    POCT Oxy Hemoglobin, Arterial 97.8 94.0 - 98.0 %    POCT Carboxyhemoglobin, Arterial 0.9 %    POCT Methemoglobin, Arterial 1.1 0.0 - 1.5 %    POCT Deoxy Hemoglobin, Arterial 0.2 0.0 - 5.0 %   Blood Gas Arterial Full Panel Unsolicited   Result Value Ref Range     POCT pH, Arterial 7.29 (L) 7.38 - 7.42 pH    POCT pCO2, Arterial 44 (H) 38 - 42 mm Hg    POCT pO2, Arterial 258 (H) 85 - 95 mm Hg    POCT SO2, Arterial 100 94 - 100 %    POCT Oxy Hemoglobin, Arterial 97.4 94.0 - 98.0 %    POCT Hematocrit Calculated, Arterial 23.0 (L) 36.0 - 46.0 %    POCT Sodium, Arterial 135 (L) 136 - 145 mmol/L    POCT Potassium, Arterial 6.6 (HH) 3.5 - 5.3 mmol/L    POCT Chloride, Arterial 111 (H) 98 - 107 mmol/L    POCT Ionized Calcium, Arterial 1.20 1.10 - 1.33 mmol/L    POCT Glucose, Arterial 203 (H) 74 - 99 mg/dL    POCT Lactate, Arterial 1.1 0.4 - 2.0 mmol/L    POCT Base Excess, Arterial -5.1 (L) -2.0 - 3.0 mmol/L    POCT HCO3 Calculated, Arterial 21.2 (L) 22.0 - 26.0 mmol/L    POCT Hemoglobin, Arterial 7.8 (L) 12.0 - 16.0 g/dL    POCT Anion Gap, Arterial 9 (L) 10 - 25 mmo/L    Patient Temperature 37.0 degrees Celsius    FiO2 70 %   Coox Panel, Arterial Unsolicited   Result Value Ref Range    POCT Hemoglobin, Arterial 7.8 (L) 12.0 - 16.0 g/dL    POCT Oxy Hemoglobin, Arterial 97.4 94.0 - 98.0 %    POCT Carboxyhemoglobin, Arterial 0.8 %    POCT Methemoglobin, Arterial 1.3 0.0 - 1.5 %    POCT Deoxy Hemoglobin, Arterial 0.5 0.0 - 5.0 %   Blood Gas Arterial Full Panel Unsolicited   Result Value Ref Range    POCT pH, Arterial 7.35 (L) 7.38 - 7.42 pH    POCT pCO2, Arterial 46 (H) 38 - 42 mm Hg    POCT pO2, Arterial 299 (H) 85 - 95 mm Hg    POCT SO2, Arterial 99 94 - 100 %    POCT Oxy Hemoglobin, Arterial 97.6 94.0 - 98.0 %    POCT Hematocrit Calculated, Arterial 23.0 (L) 36.0 - 46.0 %    POCT Sodium, Arterial 139 136 - 145 mmol/L    POCT Potassium, Arterial 5.8 (H) 3.5 - 5.3 mmol/L    POCT Chloride, Arterial 110 (H) 98 - 107 mmol/L    POCT Ionized Calcium, Arterial 1.14 1.10 - 1.33 mmol/L    POCT Glucose, Arterial 169 (H) 74 - 99 mg/dL    POCT Lactate, Arterial 1.7 0.4 - 2.0 mmol/L    POCT Base Excess, Arterial -0.3 -2.0 - 3.0 mmol/L    POCT HCO3 Calculated, Arterial 25.4 22.0 - 26.0 mmol/L     POCT Hemoglobin, Arterial 7.7 (L) 12.0 - 16.0 g/dL    POCT Anion Gap, Arterial 9 (L) 10 - 25 mmo/L    Patient Temperature 37.0 degrees Celsius    FiO2 70 %   Coox Panel, Arterial Unsolicited   Result Value Ref Range    POCT Hemoglobin, Arterial 7.7 (L) 12.0 - 16.0 g/dL    POCT Oxy Hemoglobin, Arterial 97.6 94.0 - 98.0 %    POCT Carboxyhemoglobin, Arterial 0.7 %    POCT Methemoglobin, Arterial 1.0 0.0 - 1.5 %    POCT Deoxy Hemoglobin, Arterial 0.6 0.0 - 5.0 %   Blood Gas Arterial Full Panel Unsolicited   Result Value Ref Range    POCT pH, Arterial 7.36 (L) 7.38 - 7.42 pH    POCT pCO2, Arterial 39 38 - 42 mm Hg    POCT pO2, Arterial 439 (H) 85 - 95 mm Hg    POCT SO2, Arterial 100 94 - 100 %    POCT Oxy Hemoglobin, Arterial 97.8 94.0 - 98.0 %    POCT Hematocrit Calculated, Arterial 23.0 (L) 36.0 - 46.0 %    POCT Sodium, Arterial 139 136 - 145 mmol/L    POCT Potassium, Arterial 4.9 3.5 - 5.3 mmol/L    POCT Chloride, Arterial 111 (H) 98 - 107 mmol/L    POCT Ionized Calcium, Arterial 1.20 1.10 - 1.33 mmol/L    POCT Glucose, Arterial 139 (H) 74 - 99 mg/dL    POCT Lactate, Arterial 1.9 0.4 - 2.0 mmol/L    POCT Base Excess, Arterial -3.2 (L) -2.0 - 3.0 mmol/L    POCT HCO3 Calculated, Arterial 22.0 22.0 - 26.0 mmol/L    POCT Hemoglobin, Arterial 7.8 (L) 12.0 - 16.0 g/dL    POCT Anion Gap, Arterial 11 10 - 25 mmo/L    Patient Temperature 37.0 degrees Celsius    FiO2 100 %   Coox Panel, Arterial Unsolicited   Result Value Ref Range    POCT Hemoglobin, Arterial 7.8 (L) 12.0 - 16.0 g/dL    POCT Oxy Hemoglobin, Arterial 97.8 94.0 - 98.0 %    POCT Carboxyhemoglobin, Arterial 0.9 %    POCT Methemoglobin, Arterial 1.3 0.0 - 1.5 %    POCT Deoxy Hemoglobin, Arterial 0.0 0.0 - 5.0 %   Blood Gas Arterial Full Panel Unsolicited   Result Value Ref Range    POCT pH, Arterial 7.34 (L) 7.38 - 7.42 pH    POCT pCO2, Arterial 40 38 - 42 mm Hg    POCT pO2, Arterial 357 (H) 85 - 95 mm Hg    POCT SO2, Arterial 100 94 - 100 %    POCT Oxy Hemoglobin,  Arterial 97.6 94.0 - 98.0 %    POCT Hematocrit Calculated, Arterial 29.0 (L) 36.0 - 46.0 %    POCT Sodium, Arterial 139 136 - 145 mmol/L    POCT Potassium, Arterial 4.9 3.5 - 5.3 mmol/L    POCT Chloride, Arterial 112 (H) 98 - 107 mmol/L    POCT Ionized Calcium, Arterial 1.16 1.10 - 1.33 mmol/L    POCT Glucose, Arterial 139 (H) 74 - 99 mg/dL    POCT Lactate, Arterial 1.9 0.4 - 2.0 mmol/L    POCT Base Excess, Arterial -3.9 (L) -2.0 - 3.0 mmol/L    POCT HCO3 Calculated, Arterial 21.6 (L) 22.0 - 26.0 mmol/L    POCT Hemoglobin, Arterial 9.8 (L) 12.0 - 16.0 g/dL    POCT Anion Gap, Arterial 10 10 - 25 mmo/L    Patient Temperature 37.0 degrees Celsius    FiO2 100 %   Coox Panel, Arterial Unsolicited   Result Value Ref Range    POCT Hemoglobin, Arterial 9.8 (L) 12.0 - 16.0 g/dL    POCT Oxy Hemoglobin, Arterial 97.6 94.0 - 98.0 %    POCT Carboxyhemoglobin, Arterial 0.8 %    POCT Methemoglobin, Arterial 1.5 0.0 - 1.5 %    POCT Deoxy Hemoglobin, Arterial 0.1 0.0 - 5.0 %   Magnesium   Result Value Ref Range    Magnesium 4.19 (H) 1.60 - 2.40 mg/dL   Coagulation Screen   Result Value Ref Range    Protime 11.5 9.8 - 12.4 seconds    INR 1.0 0.9 - 1.1    aPTT 26 26 - 36 seconds   Fibrinogen   Result Value Ref Range    Fibrinogen 195 (L) 200 - 400 mg/dL   CBC   Result Value Ref Range    WBC 10.6 4.4 - 11.3 x10*3/uL    nRBC 0.0 0.0 - 0.0 /100 WBCs    RBC 3.55 (L) 4.00 - 5.20 x10*6/uL    Hemoglobin 10.7 (L) 12.0 - 16.0 g/dL    Hematocrit 32.6 (L) 36.0 - 46.0 %    MCV 92 80 - 100 fL    MCH 30.1 26.0 - 34.0 pg    MCHC 32.8 32.0 - 36.0 g/dL    RDW 14.5 11.5 - 14.5 %    Platelets 98 (L) 150 - 450 x10*3/uL   Renal Function Panel   Result Value Ref Range    Glucose 160 (H) 74 - 99 mg/dL    Sodium 141 136 - 145 mmol/L    Potassium 4.8 3.5 - 5.3 mmol/L    Chloride 111 (H) 98 - 107 mmol/L    Bicarbonate 22 21 - 32 mmol/L    Anion Gap 13 10 - 20 mmol/L    Urea Nitrogen 40 (H) 6 - 23 mg/dL    Creatinine 2.10 (H) 0.50 - 1.05 mg/dL    eGFR 26 (L)  >60 mL/min/1.73m*2    Calcium 8.6 8.6 - 10.6 mg/dL    Phosphorus 2.3 (L) 2.5 - 4.9 mg/dL    Albumin 3.4 3.4 - 5.0 g/dL   Blood Gas Arterial Full Panel   Result Value Ref Range    POCT pH, Arterial 7.41 7.38 - 7.42 pH    POCT pCO2, Arterial 33 (L) 38 - 42 mm Hg    POCT pO2, Arterial 109 (H) 85 - 95 mm Hg    POCT SO2, Arterial 99 94 - 100 %    POCT Oxy Hemoglobin, Arterial 96.4 94.0 - 98.0 %    POCT Hematocrit Calculated, Arterial 34.0 (L) 36.0 - 46.0 %    POCT Sodium, Arterial 138 136 - 145 mmol/L    POCT Potassium, Arterial 5.0 3.5 - 5.3 mmol/L    POCT Chloride, Arterial 111 (H) 98 - 107 mmol/L    POCT Ionized Calcium, Arterial 1.22 1.10 - 1.33 mmol/L    POCT Glucose, Arterial 161 (H) 74 - 99 mg/dL    POCT Lactate, Arterial 0.9 0.4 - 2.0 mmol/L    POCT Base Excess, Arterial -3.1 (L) -2.0 - 3.0 mmol/L    POCT HCO3 Calculated, Arterial 20.9 (L) 22.0 - 26.0 mmol/L    POCT Hemoglobin, Arterial 11.2 (L) 12.0 - 16.0 g/dL    POCT Anion Gap, Arterial 11 10 - 25 mmo/L    Patient Temperature 37.0 degrees Celsius    FiO2 40 %   Calcium, Ionized   Result Value Ref Range    POCT Calcium, Ionized 1.18 1.1 - 1.33 mmol/L   POCT GLUCOSE   Result Value Ref Range    POCT Glucose 177 (H) 74 - 99 mg/dL   POCT GLUCOSE   Result Value Ref Range    POCT Glucose 93 74 - 99 mg/dL   CBC   Result Value Ref Range    WBC 8.2 4.4 - 11.3 x10*3/uL    nRBC 0.0 0.0 - 0.0 /100 WBCs    RBC 3.07 (L) 4.00 - 5.20 x10*6/uL    Hemoglobin 9.2 (L) 12.0 - 16.0 g/dL    Hematocrit 28.4 (L) 36.0 - 46.0 %    MCV 93 80 - 100 fL    MCH 30.0 26.0 - 34.0 pg    MCHC 32.4 32.0 - 36.0 g/dL    RDW 15.3 (H) 11.5 - 14.5 %    Platelets 92 (L) 150 - 450 x10*3/uL   Renal Function Panel   Result Value Ref Range    Glucose 130 (H) 74 - 99 mg/dL    Sodium 142 136 - 145 mmol/L    Potassium 4.4 3.5 - 5.3 mmol/L    Chloride 110 (H) 98 - 107 mmol/L    Bicarbonate 22 21 - 32 mmol/L    Anion Gap 14 10 - 20 mmol/L    Urea Nitrogen 37 (H) 6 - 23 mg/dL    Creatinine 2.14 (H) 0.50 - 1.05  mg/dL    eGFR 25 (L) >60 mL/min/1.73m*2    Calcium 8.5 (L) 8.6 - 10.6 mg/dL    Phosphorus 4.4 2.5 - 4.9 mg/dL    Albumin 4.0 3.4 - 5.0 g/dL   Magnesium   Result Value Ref Range    Magnesium 3.47 (H) 1.60 - 2.40 mg/dL   Calcium, Ionized   Result Value Ref Range    POCT Calcium, Ionized 1.19 1.1 - 1.33 mmol/L   Blood Gas Arterial Full Panel   Result Value Ref Range    POCT pH, Arterial 7.32 (L) 7.38 - 7.42 pH    POCT pCO2, Arterial 40 38 - 42 mm Hg    POCT pO2, Arterial 134 (H) 85 - 95 mm Hg    POCT SO2, Arterial 100 94 - 100 %    POCT Oxy Hemoglobin, Arterial 97.7 94.0 - 98.0 %    POCT Hematocrit Calculated, Arterial 29.0 (L) 36.0 - 46.0 %    POCT Sodium, Arterial 139 136 - 145 mmol/L    POCT Potassium, Arterial 4.4 3.5 - 5.3 mmol/L    POCT Chloride, Arterial 114 (H) 98 - 107 mmol/L    POCT Ionized Calcium, Arterial 1.19 1.10 - 1.33 mmol/L    POCT Glucose, Arterial 126 (H) 74 - 99 mg/dL    POCT Lactate, Arterial 0.6 0.4 - 2.0 mmol/L    POCT Base Excess, Arterial -5.1 (L) -2.0 - 3.0 mmol/L    POCT HCO3 Calculated, Arterial 20.6 (L) 22.0 - 26.0 mmol/L    POCT Hemoglobin, Arterial 9.7 (L) 12.0 - 16.0 g/dL    POCT Anion Gap, Arterial 9 (L) 10 - 25 mmo/L    Patient Temperature 37.0 degrees Celsius    FiO2 40 %   Blood Gas Arterial Full Panel   Result Value Ref Range    POCT pH, Arterial 7.32 (L) 7.38 - 7.42 pH    POCT pCO2, Arterial 40 38 - 42 mm Hg    POCT pO2, Arterial 150 (H) 85 - 95 mm Hg    POCT SO2, Arterial 100 94 - 100 %    POCT Oxy Hemoglobin, Arterial 97.3 94.0 - 98.0 %    POCT Hematocrit Calculated, Arterial 29.0 (L) 36.0 - 46.0 %    POCT Sodium, Arterial 140 136 - 145 mmol/L    POCT Potassium, Arterial 4.6 3.5 - 5.3 mmol/L    POCT Chloride, Arterial 112 (H) 98 - 107 mmol/L    POCT Ionized Calcium, Arterial 1.20 1.10 - 1.33 mmol/L    POCT Glucose, Arterial 126 (H) 74 - 99 mg/dL    POCT Lactate, Arterial 0.6 0.4 - 2.0 mmol/L    POCT Base Excess, Arterial -5.1 (L) -2.0 - 3.0 mmol/L    POCT HCO3 Calculated,  Arterial 20.6 (L) 22.0 - 26.0 mmol/L    POCT Hemoglobin, Arterial 9.7 (L) 12.0 - 16.0 g/dL    POCT Anion Gap, Arterial 12 10 - 25 mmo/L    Patient Temperature 37.0 degrees Celsius    FiO2 21 %      Cronelia Macias is a 64 y.o. year old female patient who presents for CABGX 3-4, LIMA-LAD, SVG-OM, SVG-PDA, KATHERINE CLIP 40MM ATRICLP, POSTERIOR PERICARDIAL WINDOW, RIGHT ENDOVASCULAR SVG HARVEST  with Dr. Lopez on 3/26/25. Acute Pain consulted for block for postoperative pain control.     Plan:    - B/l TAP and SAP single shot blocks performed post-operatively in the OR on 3/26/25  - Pain medications per primary team  - Will see on POD1 if inpatient    Acute Pain Team  pg [phone] ph [phone].

## 2025-03-27 NOTE — PROGRESS NOTES
Postop Pain HPI -   Palliative: relieved with IV analgesics and regional local anesthetics  Provocative: movement  Quality:  burning and aching  Radiation:  none  Severity:  8/10  Timing: constant    24-HOUR OPIOID CONSUMPTION:  Dilaudid 1.3 mg   Oxycodone 15 mg     Scheduled medications  acetaminophen, 1,000 mg, intravenous, q6h  [Held by provider] acetaminophen, 650 mg, oral, q6h  aspirin, 81 mg, oral, Daily  atorvastatin, 80 mg, oral, Nightly  carvedilol, 6.25 mg, oral, BID  ceFAZolin, 2 g, intravenous, q12h  cholecalciferol, 1,000 Units, oral, Daily  heparin (porcine), 5,000 Units, subcutaneous, q8h  insulin lispro, 0-15 Units, subcutaneous, Before meals & nightly  polyethylene glycol, 17 g, oral, BID  sennosides-docusate sodium, 2 tablet, oral, BID      Continuous medications  clevidipine, 0-16 mg/hr, Last Rate: 4 mg/hr (03/27/25 1100)  lactated Ringer's, 30 mL/hr, Last Rate: 30 mL/hr (03/27/25 1100)  lactated Ringer's, 5 mL/hr, Last Rate: 5 mL/hr (03/27/25 1100)      PRN medications  PRN medications: calcium gluconate, calcium gluconate, hydrALAZINE, HYDROmorphone, HYDROmorphone, magnesium sulfate, magnesium sulfate, naloxone, ondansetron **OR** ondansetron, [Held by provider] oxyCODONE, [Held by provider] oxyCODONE, oxygen, potassium chloride CR **OR** potassium chloride, potassium chloride CR **OR** potassium chloride, potassium chloride, potassium chloride     Physical Exam:  Constitutional:  no distress, alert and cooperative  Eyes: clear sclera  Head/Neck: No apparent injury, trachea midline  Respiratory/Thorax: Patent airways, thorax symmetric, breathing comfortably  Cardiovascular: no pitting edema  Gastrointestinal: Nondistended  Musculoskeletal: ROM intact  Extremities: no clubbing  Neurological: alert, jorgensen x4  Psychological: Appropriate affect    Results for orders placed or performed during the hospital encounter of 03/19/25 (from the past 24 hours)   Blood Gas Venous Full Panel Unsolicited   Result  Value Ref Range    POCT pH, Venous 7.32 (L) 7.33 - 7.43 pH    POCT pCO2, Venous 42 41 - 51 mm Hg    POCT pO2, Venous 46 (H) 35 - 45 mm Hg    POCT SO2, Venous 83 (H) 45 - 75 %    POCT Oxy Hemoglobin, Venous 81.0 (H) 45.0 - 75.0 %    POCT Hematocrit Calculated, Venous 23.0 (L) 36.0 - 46.0 %    POCT Sodium, Venous 135 (L) 136 - 145 mmol/L    POCT Potassium, Venous 6.4 (HH) 3.5 - 5.3 mmol/L    POCT Chloride, Venous 109 (H) 98 - 107 mmol/L    POCT Ionized Calicum, Venous 1.14 1.10 - 1.33 mmol/L    POCT Glucose, Venous 205 (H) 74 - 99 mg/dL    POCT Lactate, Venous 1.2 0.4 - 2.0 mmol/L    POCT Base Excess, Venous -4.2 (L) -2.0 - 3.0 mmol/L    POCT HCO3 Calculated, Venous 21.6 (L) 22.0 - 26.0 mmol/L    POCT Hemoglobin, Venous 7.8 (L) 12.0 - 16.0 g/dL    POCT Anion Gap, Venous 11.0 10.0 - 25.0 mmol/L    Patient Temperature 37.0 degrees Celsius    FiO2 70 %   Coox Panel, Venous Unsolicited   Result Value Ref Range    POCT Carboxyhemoglobin, Venous 1.5 %    POCT Methemoglobin, Venous 0.7 0.0 - 1.5 %   Blood Gas Arterial Full Panel Unsolicited   Result Value Ref Range    POCT pH, Arterial 7.37 (L) 7.38 - 7.42 pH    POCT pCO2, Arterial 34 (L) 38 - 42 mm Hg    POCT pO2, Arterial 394 (H) 85 - 95 mm Hg    POCT SO2, Arterial 100 94 - 100 %    POCT Oxy Hemoglobin, Arterial 97.8 94.0 - 98.0 %    POCT Hematocrit Calculated, Arterial 23.0 (L) 36.0 - 46.0 %    POCT Sodium, Arterial 135 (L) 136 - 145 mmol/L    POCT Potassium, Arterial 6.3 (HH) 3.5 - 5.3 mmol/L    POCT Chloride, Arterial 110 (H) 98 - 107 mmol/L    POCT Ionized Calcium, Arterial 1.15 1.10 - 1.33 mmol/L    POCT Glucose, Arterial 196 (H) 74 - 99 mg/dL    POCT Lactate, Arterial 0.8 0.4 - 2.0 mmol/L    POCT Base Excess, Arterial -5.0 (L) -2.0 - 3.0 mmol/L    POCT HCO3 Calculated, Arterial 19.7 (L) 22.0 - 26.0 mmol/L    POCT Hemoglobin, Arterial 7.8 (L) 12.0 - 16.0 g/dL    POCT Anion Gap, Arterial 12 10 - 25 mmo/L    Patient Temperature 37.0 degrees Celsius    FiO2 70 %   Coox  Panel, Arterial Unsolicited   Result Value Ref Range    POCT Hemoglobin, Arterial 7.8 (L) 12.0 - 16.0 g/dL    POCT Oxy Hemoglobin, Arterial 97.8 94.0 - 98.0 %    POCT Carboxyhemoglobin, Arterial 0.9 %    POCT Methemoglobin, Arterial 1.1 0.0 - 1.5 %    POCT Deoxy Hemoglobin, Arterial 0.2 0.0 - 5.0 %   Blood Gas Arterial Full Panel Unsolicited   Result Value Ref Range    POCT pH, Arterial 7.29 (L) 7.38 - 7.42 pH    POCT pCO2, Arterial 44 (H) 38 - 42 mm Hg    POCT pO2, Arterial 258 (H) 85 - 95 mm Hg    POCT SO2, Arterial 100 94 - 100 %    POCT Oxy Hemoglobin, Arterial 97.4 94.0 - 98.0 %    POCT Hematocrit Calculated, Arterial 23.0 (L) 36.0 - 46.0 %    POCT Sodium, Arterial 135 (L) 136 - 145 mmol/L    POCT Potassium, Arterial 6.6 (HH) 3.5 - 5.3 mmol/L    POCT Chloride, Arterial 111 (H) 98 - 107 mmol/L    POCT Ionized Calcium, Arterial 1.20 1.10 - 1.33 mmol/L    POCT Glucose, Arterial 203 (H) 74 - 99 mg/dL    POCT Lactate, Arterial 1.1 0.4 - 2.0 mmol/L    POCT Base Excess, Arterial -5.1 (L) -2.0 - 3.0 mmol/L    POCT HCO3 Calculated, Arterial 21.2 (L) 22.0 - 26.0 mmol/L    POCT Hemoglobin, Arterial 7.8 (L) 12.0 - 16.0 g/dL    POCT Anion Gap, Arterial 9 (L) 10 - 25 mmo/L    Patient Temperature 37.0 degrees Celsius    FiO2 70 %   Coox Panel, Arterial Unsolicited   Result Value Ref Range    POCT Hemoglobin, Arterial 7.8 (L) 12.0 - 16.0 g/dL    POCT Oxy Hemoglobin, Arterial 97.4 94.0 - 98.0 %    POCT Carboxyhemoglobin, Arterial 0.8 %    POCT Methemoglobin, Arterial 1.3 0.0 - 1.5 %    POCT Deoxy Hemoglobin, Arterial 0.5 0.0 - 5.0 %   Blood Gas Arterial Full Panel Unsolicited   Result Value Ref Range    POCT pH, Arterial 7.35 (L) 7.38 - 7.42 pH    POCT pCO2, Arterial 46 (H) 38 - 42 mm Hg    POCT pO2, Arterial 299 (H) 85 - 95 mm Hg    POCT SO2, Arterial 99 94 - 100 %    POCT Oxy Hemoglobin, Arterial 97.6 94.0 - 98.0 %    POCT Hematocrit Calculated, Arterial 23.0 (L) 36.0 - 46.0 %    POCT Sodium, Arterial 139 136 - 145 mmol/L     POCT Potassium, Arterial 5.8 (H) 3.5 - 5.3 mmol/L    POCT Chloride, Arterial 110 (H) 98 - 107 mmol/L    POCT Ionized Calcium, Arterial 1.14 1.10 - 1.33 mmol/L    POCT Glucose, Arterial 169 (H) 74 - 99 mg/dL    POCT Lactate, Arterial 1.7 0.4 - 2.0 mmol/L    POCT Base Excess, Arterial -0.3 -2.0 - 3.0 mmol/L    POCT HCO3 Calculated, Arterial 25.4 22.0 - 26.0 mmol/L    POCT Hemoglobin, Arterial 7.7 (L) 12.0 - 16.0 g/dL    POCT Anion Gap, Arterial 9 (L) 10 - 25 mmo/L    Patient Temperature 37.0 degrees Celsius    FiO2 70 %   Coox Panel, Arterial Unsolicited   Result Value Ref Range    POCT Hemoglobin, Arterial 7.7 (L) 12.0 - 16.0 g/dL    POCT Oxy Hemoglobin, Arterial 97.6 94.0 - 98.0 %    POCT Carboxyhemoglobin, Arterial 0.7 %    POCT Methemoglobin, Arterial 1.0 0.0 - 1.5 %    POCT Deoxy Hemoglobin, Arterial 0.6 0.0 - 5.0 %   Blood Gas Arterial Full Panel Unsolicited   Result Value Ref Range    POCT pH, Arterial 7.36 (L) 7.38 - 7.42 pH    POCT pCO2, Arterial 39 38 - 42 mm Hg    POCT pO2, Arterial 439 (H) 85 - 95 mm Hg    POCT SO2, Arterial 100 94 - 100 %    POCT Oxy Hemoglobin, Arterial 97.8 94.0 - 98.0 %    POCT Hematocrit Calculated, Arterial 23.0 (L) 36.0 - 46.0 %    POCT Sodium, Arterial 139 136 - 145 mmol/L    POCT Potassium, Arterial 4.9 3.5 - 5.3 mmol/L    POCT Chloride, Arterial 111 (H) 98 - 107 mmol/L    POCT Ionized Calcium, Arterial 1.20 1.10 - 1.33 mmol/L    POCT Glucose, Arterial 139 (H) 74 - 99 mg/dL    POCT Lactate, Arterial 1.9 0.4 - 2.0 mmol/L    POCT Base Excess, Arterial -3.2 (L) -2.0 - 3.0 mmol/L    POCT HCO3 Calculated, Arterial 22.0 22.0 - 26.0 mmol/L    POCT Hemoglobin, Arterial 7.8 (L) 12.0 - 16.0 g/dL    POCT Anion Gap, Arterial 11 10 - 25 mmo/L    Patient Temperature 37.0 degrees Celsius    FiO2 100 %   Coox Panel, Arterial Unsolicited   Result Value Ref Range    POCT Hemoglobin, Arterial 7.8 (L) 12.0 - 16.0 g/dL    POCT Oxy Hemoglobin, Arterial 97.8 94.0 - 98.0 %    POCT Carboxyhemoglobin,  Arterial 0.9 %    POCT Methemoglobin, Arterial 1.3 0.0 - 1.5 %    POCT Deoxy Hemoglobin, Arterial 0.0 0.0 - 5.0 %   Blood Gas Arterial Full Panel Unsolicited   Result Value Ref Range    POCT pH, Arterial 7.34 (L) 7.38 - 7.42 pH    POCT pCO2, Arterial 40 38 - 42 mm Hg    POCT pO2, Arterial 357 (H) 85 - 95 mm Hg    POCT SO2, Arterial 100 94 - 100 %    POCT Oxy Hemoglobin, Arterial 97.6 94.0 - 98.0 %    POCT Hematocrit Calculated, Arterial 29.0 (L) 36.0 - 46.0 %    POCT Sodium, Arterial 139 136 - 145 mmol/L    POCT Potassium, Arterial 4.9 3.5 - 5.3 mmol/L    POCT Chloride, Arterial 112 (H) 98 - 107 mmol/L    POCT Ionized Calcium, Arterial 1.16 1.10 - 1.33 mmol/L    POCT Glucose, Arterial 139 (H) 74 - 99 mg/dL    POCT Lactate, Arterial 1.9 0.4 - 2.0 mmol/L    POCT Base Excess, Arterial -3.9 (L) -2.0 - 3.0 mmol/L    POCT HCO3 Calculated, Arterial 21.6 (L) 22.0 - 26.0 mmol/L    POCT Hemoglobin, Arterial 9.8 (L) 12.0 - 16.0 g/dL    POCT Anion Gap, Arterial 10 10 - 25 mmo/L    Patient Temperature 37.0 degrees Celsius    FiO2 100 %   Coox Panel, Arterial Unsolicited   Result Value Ref Range    POCT Hemoglobin, Arterial 9.8 (L) 12.0 - 16.0 g/dL    POCT Oxy Hemoglobin, Arterial 97.6 94.0 - 98.0 %    POCT Carboxyhemoglobin, Arterial 0.8 %    POCT Methemoglobin, Arterial 1.5 0.0 - 1.5 %    POCT Deoxy Hemoglobin, Arterial 0.1 0.0 - 5.0 %   Magnesium   Result Value Ref Range    Magnesium 4.19 (H) 1.60 - 2.40 mg/dL   Coagulation Screen   Result Value Ref Range    Protime 11.5 9.8 - 12.4 seconds    INR 1.0 0.9 - 1.1    aPTT 26 26 - 36 seconds   Fibrinogen   Result Value Ref Range    Fibrinogen 195 (L) 200 - 400 mg/dL   CBC   Result Value Ref Range    WBC 10.6 4.4 - 11.3 x10*3/uL    nRBC 0.0 0.0 - 0.0 /100 WBCs    RBC 3.55 (L) 4.00 - 5.20 x10*6/uL    Hemoglobin 10.7 (L) 12.0 - 16.0 g/dL    Hematocrit 32.6 (L) 36.0 - 46.0 %    MCV 92 80 - 100 fL    MCH 30.1 26.0 - 34.0 pg    MCHC 32.8 32.0 - 36.0 g/dL    RDW 14.5 11.5 - 14.5 %     Platelets 98 (L) 150 - 450 x10*3/uL   Renal Function Panel   Result Value Ref Range    Glucose 160 (H) 74 - 99 mg/dL    Sodium 141 136 - 145 mmol/L    Potassium 4.8 3.5 - 5.3 mmol/L    Chloride 111 (H) 98 - 107 mmol/L    Bicarbonate 22 21 - 32 mmol/L    Anion Gap 13 10 - 20 mmol/L    Urea Nitrogen 40 (H) 6 - 23 mg/dL    Creatinine 2.10 (H) 0.50 - 1.05 mg/dL    eGFR 26 (L) >60 mL/min/1.73m*2    Calcium 8.6 8.6 - 10.6 mg/dL    Phosphorus 2.3 (L) 2.5 - 4.9 mg/dL    Albumin 3.4 3.4 - 5.0 g/dL   Blood Gas Arterial Full Panel   Result Value Ref Range    POCT pH, Arterial 7.41 7.38 - 7.42 pH    POCT pCO2, Arterial 33 (L) 38 - 42 mm Hg    POCT pO2, Arterial 109 (H) 85 - 95 mm Hg    POCT SO2, Arterial 99 94 - 100 %    POCT Oxy Hemoglobin, Arterial 96.4 94.0 - 98.0 %    POCT Hematocrit Calculated, Arterial 34.0 (L) 36.0 - 46.0 %    POCT Sodium, Arterial 138 136 - 145 mmol/L    POCT Potassium, Arterial 5.0 3.5 - 5.3 mmol/L    POCT Chloride, Arterial 111 (H) 98 - 107 mmol/L    POCT Ionized Calcium, Arterial 1.22 1.10 - 1.33 mmol/L    POCT Glucose, Arterial 161 (H) 74 - 99 mg/dL    POCT Lactate, Arterial 0.9 0.4 - 2.0 mmol/L    POCT Base Excess, Arterial -3.1 (L) -2.0 - 3.0 mmol/L    POCT HCO3 Calculated, Arterial 20.9 (L) 22.0 - 26.0 mmol/L    POCT Hemoglobin, Arterial 11.2 (L) 12.0 - 16.0 g/dL    POCT Anion Gap, Arterial 11 10 - 25 mmo/L    Patient Temperature 37.0 degrees Celsius    FiO2 40 %   Calcium, Ionized   Result Value Ref Range    POCT Calcium, Ionized 1.18 1.1 - 1.33 mmol/L   POCT GLUCOSE   Result Value Ref Range    POCT Glucose 177 (H) 74 - 99 mg/dL   POCT GLUCOSE   Result Value Ref Range    POCT Glucose 93 74 - 99 mg/dL   CBC   Result Value Ref Range    WBC 8.2 4.4 - 11.3 x10*3/uL    nRBC 0.0 0.0 - 0.0 /100 WBCs    RBC 3.07 (L) 4.00 - 5.20 x10*6/uL    Hemoglobin 9.2 (L) 12.0 - 16.0 g/dL    Hematocrit 28.4 (L) 36.0 - 46.0 %    MCV 93 80 - 100 fL    MCH 30.0 26.0 - 34.0 pg    MCHC 32.4 32.0 - 36.0 g/dL    RDW 15.3  (H) 11.5 - 14.5 %    Platelets 92 (L) 150 - 450 x10*3/uL   Renal Function Panel   Result Value Ref Range    Glucose 130 (H) 74 - 99 mg/dL    Sodium 142 136 - 145 mmol/L    Potassium 4.4 3.5 - 5.3 mmol/L    Chloride 110 (H) 98 - 107 mmol/L    Bicarbonate 22 21 - 32 mmol/L    Anion Gap 14 10 - 20 mmol/L    Urea Nitrogen 37 (H) 6 - 23 mg/dL    Creatinine 2.14 (H) 0.50 - 1.05 mg/dL    eGFR 25 (L) >60 mL/min/1.73m*2    Calcium 8.5 (L) 8.6 - 10.6 mg/dL    Phosphorus 4.4 2.5 - 4.9 mg/dL    Albumin 4.0 3.4 - 5.0 g/dL   Magnesium   Result Value Ref Range    Magnesium 3.47 (H) 1.60 - 2.40 mg/dL   Calcium, Ionized   Result Value Ref Range    POCT Calcium, Ionized 1.19 1.1 - 1.33 mmol/L   Blood Gas Arterial Full Panel   Result Value Ref Range    POCT pH, Arterial 7.32 (L) 7.38 - 7.42 pH    POCT pCO2, Arterial 40 38 - 42 mm Hg    POCT pO2, Arterial 134 (H) 85 - 95 mm Hg    POCT SO2, Arterial 100 94 - 100 %    POCT Oxy Hemoglobin, Arterial 97.7 94.0 - 98.0 %    POCT Hematocrit Calculated, Arterial 29.0 (L) 36.0 - 46.0 %    POCT Sodium, Arterial 139 136 - 145 mmol/L    POCT Potassium, Arterial 4.4 3.5 - 5.3 mmol/L    POCT Chloride, Arterial 114 (H) 98 - 107 mmol/L    POCT Ionized Calcium, Arterial 1.19 1.10 - 1.33 mmol/L    POCT Glucose, Arterial 126 (H) 74 - 99 mg/dL    POCT Lactate, Arterial 0.6 0.4 - 2.0 mmol/L    POCT Base Excess, Arterial -5.1 (L) -2.0 - 3.0 mmol/L    POCT HCO3 Calculated, Arterial 20.6 (L) 22.0 - 26.0 mmol/L    POCT Hemoglobin, Arterial 9.7 (L) 12.0 - 16.0 g/dL    POCT Anion Gap, Arterial 9 (L) 10 - 25 mmo/L    Patient Temperature 37.0 degrees Celsius    FiO2 40 %   Blood Gas Arterial Full Panel   Result Value Ref Range    POCT pH, Arterial 7.32 (L) 7.38 - 7.42 pH    POCT pCO2, Arterial 40 38 - 42 mm Hg    POCT pO2, Arterial 150 (H) 85 - 95 mm Hg    POCT SO2, Arterial 100 94 - 100 %    POCT Oxy Hemoglobin, Arterial 97.3 94.0 - 98.0 %    POCT Hematocrit Calculated, Arterial 29.0 (L) 36.0 - 46.0 %    POCT  Sodium, Arterial 140 136 - 145 mmol/L    POCT Potassium, Arterial 4.6 3.5 - 5.3 mmol/L    POCT Chloride, Arterial 112 (H) 98 - 107 mmol/L    POCT Ionized Calcium, Arterial 1.20 1.10 - 1.33 mmol/L    POCT Glucose, Arterial 126 (H) 74 - 99 mg/dL    POCT Lactate, Arterial 0.6 0.4 - 2.0 mmol/L    POCT Base Excess, Arterial -5.1 (L) -2.0 - 3.0 mmol/L    POCT HCO3 Calculated, Arterial 20.6 (L) 22.0 - 26.0 mmol/L    POCT Hemoglobin, Arterial 9.7 (L) 12.0 - 16.0 g/dL    POCT Anion Gap, Arterial 12 10 - 25 mmo/L    Patient Temperature 37.0 degrees Celsius    FiO2 21 %   POCT GLUCOSE   Result Value Ref Range    POCT Glucose 148 (H) 74 - 99 mg/dL         Cornelia Macias is a 64 y.o. year old female patient who presents for CABGX 3-4, LIMA-LAD, SVG-OM, SVG-PDA, KATHERINE CLIP 40MM ATRICLP, POSTERIOR PERICARDIAL WINDOW, RIGHT ENDOVASCULAR SVG HARVEST  with Dr. Lopez on 3/26/25. Acute Pain consulted for block for postoperative pain control.      Plan:     - B/l TAP and SAP single shot blocks performed post-operatively in the OR on 3/26/25  - Pain medications per primary team  Recommendations:  - IV Mg 2g Q8H x 3 doses  - APS will sign off      Acute Pain Team  pg [phone] ph [phone].

## 2025-03-27 NOTE — PROGRESS NOTES
Speech-Language Pathology  Adult Inpatient Clinical Bedside Swallow Evaluation    Patient Name: Cornelia Macias  MRN: [id number]  Today's Date: 3/27/2025   Start Time: 1007  Stop Time: 1028  Time Calculation (min): 21    History of Present Illness:   Ms. Cornelia Macias is a 64 y.o. female history notable for CHF, HLD, DM2, CAD, depression, Hepatitis C tx Mavy[place] 2018 with SVR (hepatitis C is cured, with last RNA in 2021 undetectable), who presented at as a direct admission for a CABG evaluation.      The patient was found to have coronary artery disease back in 10/23 (mid LAD 85% + 90%; Diag#1 75%, midCirc 65%, midRCA 80%) planned for CABG then, but patient opted for medical management. Now, the patient states she has been more fatigud than normal. Denies chest pain, SOB, LH, dizziness, palpitations, syncope, n/v, and abdominal pain. Follows Dr. Amador, cardiologist at . Repeat LHC on 3/21/25: prox LAD 85%, prox 1st diag 80%, mid circ 75%, OM1 90%, mid RCA 70%. Now s/p CABGx3 (LIMA-LAD, SVG-OM, SVG-PDA), KATHERINE clip, posterior pericardial window on 3/26 with Dr. Lopez.    Assessment:   Clinical bedside swallow evaluation completed. Pt OOB in chair. Alert and oriented to self, knows she's in the hospital and the year. Overall confused as to why she's here. Vocal quality soft and high pitch which pt endorses is different from baseline. Oral musculature functional. Pt tolerated ice chips and small sips of water. Post prandial cough with larger or consecutive sips of water concerning for pharyngeal dysphagia. Deferred further PO trials as this places pt at risk for aspiration. Prefer to give pt 1 more day for spontaneous recovery and stimulating swallow with ice chips prior to instrumental exam. SLP to follow to ascertain readiness for PO vs need for a FEES. Discuss results and recommendations with RN and MD whom are in agreement with POC as is the patient.      Recommendations:  NPO    Frequent, aggressive oral care is  strongly recommended to improve infection control as well as reduce dental plaque and bacteria on oropharyngeal surfaces which may increase the risk nosocomial infections, including pneumonia.    OK for small amounts of ice chips (one at a time, 10x/hour) for pleasure/swallow stimulation, but only after aggressive oral care to avoid colonization of bacteria within oral cavity.    Goal:   Pt will tolerate least restrictive diet without s/s aspiration, respiratory compromise, or overt difficulty throughout admission.  Start: 03/27/25, End: 4/11/25  Status: goal initiated         Plan:  SLP Services Indicated: Yes  Frequency: x1/wk  Discussed POC with patient  SLP - OK to Discharge    Pain:   0-10  0 = No pain.     Inpatient Education:  Extensive education provided to patient regarding current swallow function, recommendations/results, and POC.      Consultations/Referrals/Coordination of Services:   N/A

## 2025-03-27 NOTE — PROGRESS NOTES
I saw and evaluated the patient. I personally obtained the key and critical portions of the history and physical exam or was physically present for key and critical portions performed by the resident/fellow. I reviewed the resident/fellow's documentation and discussed the patient with the resident/fellow. I agree with the resident/fellow's medical decision making as documented in the note with the exception/addition of the following:     Assessment/Plan:     Neuro: Arrived intubated/sedated. Neuro intact. Plan for SAT. MMPC. Delirium precautions     CV: S/p CABx3, LAAL. Normal BiV, hx of HFpEF. MAP 70-90, on Cleviprex weaning off to maintain systolics<140, added PRN hydralazine and labetalol. bASA. Statin. NSR, backup wires.      Pulm: extubated to NC, Wean FI for SpO2 > 90%     FEN/GI: NPO with ice chips. Failed bedside swallow by speech pathology, FEES tomorrow. HCV history, in viral remission w/ treatment     Renal: Strict I/O. Leon in place for UOP. Daily RFP. Electrolyte repletion per CTICU protocol. Hx of CKD, Scr at baseline can consider gentle diuresis this afternoon.      Heme: ABLA, stable on postop labs. CBC, Coags. Monitor CT output, allotransfuse as needed. bASA. SQH     ID: Periop ABX     Endo: BG goal < 180, SSI     MSK/Skin: Monitor for SSI/PI     PPX: SCD; SQH      T/L/D: Maintain current line configuration while weaning off cleveprex     Dispo: CTICU        Kasia Fuller MD.    Division of Critical Care Medicine  Department of Anesthesiology  Department of Cardiothoracic Anesthesiology       This critically ill patient continues to be at risk for clinically significant deterioration / failure due to the above mentioned dysfunctional, unstable organ systems.  I have personally identified and managed all complex critical care issues to prevent aforementioned clinical deterioration.  Critical care time is spent at bedside and/or the immediate area and has included, but is  not limited to, the review of diagnostic tests, labs, radiographs, serial assessments of hemodynamics, respiratory status, ventilatory management, review of consult team recommendations, and family updates.  Time spent in procedures and teaching are reported separately. Critical Care Time: 40 minutes.

## 2025-03-27 NOTE — PROGRESS NOTES
"CTICU Progress Note  Cornelia Macias/[id number]    Admit Date: 3/19/2025  Hospital Length of Stay: 8   ICU Length of Stay: 1d 2h   CT SURGEON:     SUBJECTIVE:   Extubated to RA at 3am. S/p 500mL bolus this AM.    MEDICATIONS  Infusions:  clevidipine, Last Rate: 2 mg/hr (03/27/25 1700)  lactated Ringer's, Last Rate: 30 mL/hr (03/27/25 1700)  lactated Ringer's, Last Rate: 5 mL/hr (03/27/25 1700)      Scheduled:  acetaminophen, 1,000 mg, q6h  [Held by provider] acetaminophen, 650 mg, q6h  aspirin, 150 mg, Daily  atorvastatin, 80 mg, Nightly  carvedilol, 6.25 mg, BID  ceFAZolin, 2 g, q12h  cholecalciferol, 1,000 Units, Daily  heparin (porcine), 5,000 Units, q8h  insulin lispro, 0-15 Units, Before meals & nightly  labetaloL, 10 mg, Once  polyethylene glycol, 17 g, BID  sennosides-docusate sodium, 2 tablet, BID      PRN:  calcium gluconate, 1 g, q6h PRN  calcium gluconate, 2 g, q6h PRN  hydrALAZINE, 10 mg, q6h PRN  HYDROmorphone, 0.4 mg, q4h PRN  HYDROmorphone, 0.2 mg, q2h PRN  magnesium sulfate, 2 g, q6h PRN  magnesium sulfate, 4 g, q6h PRN  naloxone, 0.2 mg, q5 min PRN  ondansetron, 4 mg, q8h PRN   Or  ondansetron, 4 mg, q8h PRN  [Held by provider] oxyCODONE, 10 mg, q4h PRN  [Held by provider] oxyCODONE, 5 mg, q4h PRN  oxygen, , Continuous PRN - O2/gases  potassium chloride CR, 20 mEq, q6h PRN   Or  potassium chloride, 20 mEq, q6h PRN  potassium chloride CR, 40 mEq, q6h PRN   Or  potassium chloride, 40 mEq, q6h PRN  potassium chloride, 20 mEq, q6h PRN  potassium chloride, 40 mEq, q6h PRN        PHYSICAL EXAM:   Visit Vitals  /64 (BP Location: Left arm)   Pulse 93   Temp 37.2 °C (99 °F) (Temporal)   Resp (!) 35   Ht 1.575 m (5' 2\")   Wt 58.5 kg (128 lb 15.5 oz)   SpO2 97%   BMI 23.59 kg/m²   Smoking Status Every Day   BSA 1.6 m²     Wt Readings from Last 5 Encounters:   03/26/25 58.5 kg (128 lb 15.5 oz)   03/18/25 60.5 kg (133 lb 6.4 oz)   10/23/24 58.6 kg (129 lb 3.2 oz)   10/03/24 59.5 kg (131 lb 3.2 oz)   09/23/24 " 58.3 kg (128 lb 9.6 oz)     INTAKE/OUTPUT:  I/O last 3 completed shifts:  In: 4446.9 (76 mL/kg) [I.V.:1048.9 (17.9 mL/kg); Blood:988; IV Piggyback:2410]  Out: 2325 (39.7 mL/kg) [Urine:1080 (0.5 mL/kg/hr); Other:460; Blood:250; Chest Tube:535]  Weight: 58.5 kg          Vent settings:  Vent Mode: Pressure support  FiO2 (%):  [30 %] 30 %  S RR:  [16] 16  S VT:  [400 mL] 400 mL  PEEP/CPAP (cm H2O):  [5 cm H20-8 cm H20] 5 cm H20  IN SUP:  [5 cm H20-10 cm H20] 5 cm H20  MAP (cm H2O):  [9] 9    Physical Exam:   - CONSTITUTION: Awake sitting in chair.  - NEUROLOGIC: Alert and oriented x3, pupils round equal and reactive bilaterally, moving all extremities against gravity, no focal neurological deficits.  - CARDIOVASCULAR: NSR. Regular rate  - RESPIRATORY: CTAB. Equal chest rise and fall. No respiratory distress  Chest: Chest tubes in place. No evidence of surrounding infection. Appropriately dressed.  - GI: Abdomen is soft, non distended, and non tender  - : Leon in place with yellow draining urine  - EXTREMITIES: Moving all extremities. Warm. Capillary refill less than 2 seconds.  - SKIN: Warm and dry. Surgical sites appropriately dressed.   - PSYCHIATRIC: Appropriate mood and affect     Daily Risk Screen   Central line: Hemodynamic instability requiring parenteral medication   Leon: Accurate I/Os in critically ill patient    Images:     Invasive Hemodynamics:    Most Recent Range Past 24hrs   BP (Art) 152/71 Arterial Line BP 1  Min: 97/56  Max: 152/71   MAP(Art) 101 mmHg Arterial Line MAP 1 (mmHg)   Min: 72 mmHg  Max: 107 mmHg   RA/CVP   No data recorded   PA   No data recorded   PA(mean)   No data recorded   CO   No data recorded   CI   No data recorded   Mixed Venous   No data recorded   SVR    No data recorded         Assessment/Plan     Ms. Cornelia Macias is a 64 y.o. female history notable for CHF, HLD, DM2, CAD, depression, Hepatitis C tx Mavyret 2018 with SVR (hepatitis C is cured, with last RNA in 2021  undetectable), now s/p CABGx3 with Dr. Lopez. Now s/p CABGx3 (LIMA-LAD, SVG-OM, SVG-PDA), KATHERINE clip, posterior pericardial window on 3/26 with Dr. Lopez.     Assessment:     Plan:  NEURO:  PMHx none. Patient is intubated and sedated on propofol infusion. Acute post operative pain.   -->  - Serial neuro and pain assessments   - Scheduled  IV Tylenol   - PRN and breakthrough dilaudid   - PT Consult, OOB to chair as tolerated, chair position if not tolerated   - CAM ICU score qshift  - Sleep/wake cycle hygiene     CV:  Patient has a history of CHF, HLD, CAD Is now status post CABGx3 Pre/Post EF: 50-55% Arrived to CTICU on 0.02 epi. Brief period of HTN with MAPs in 130s, responded well to Cleviprex ggt. A/V epicardial wires set VVI @ 50.-->  -Wean cleviprex  -Start home coreg   -Start lebatalol 10mg q6HR and hydralazine q6hr for MAP greater than 90  -Lasix 20mg  - Maintain goal MAP 75-95  - Mixed venous and CI Q4H  - Volume resuscitate as clinically indicated  - Maintain epicardial wires set VVI @ 50  - Start statin  - Hold home Imdur, valsartan     PULM:  PMHx 25 pack year smoker, possible REJI, COPD.  Currently intubated on ventilator. Chest tubes 2x pleural, 1 med.-->  - F/u post op CXR  -Extubated to RA  - Wean FiO2 maintaining SpO2 >92%.   - IS q1h and OOB to chair when extubated  - Chest tubes to wall suction.     GI:  PMH Hep C (cured, RNA negative 2021).  OG in place.-->. Failed bedside swallow.   -Speech therapy recs: NPO, aggressive oral care, 10 ice chips per hour, will evaluate tomorrow   - NPO  - Colace/senna BID and miralax BID     :  CSA-SERG Risk Score high.  PMHx CKD4, baseline creatinine 2.0. Creatinine stable post-op. Patino in place and making adequate UOP. -->  - Continue patino catheter for strict I/Os.  - Goal UOP 0.5ml/kg/hr  - RFP as clinically indicated  - Replete electrolytes per CTICU protocol     ENDO:  PMH of DMT2 A1c: 6.5-->  - Maintain BG <180, insulin per CTICU protocol  - Hold home  jardiance     HEME:  Acute blood loss anemia and thrombocytopenia.-->    - Monitor drain output volume and characteristics  - CBC, coags, and fibrinogen post op and as clinically indicated  - Start ASA 6hrs post-op for CABG  - If CABG is 2/2 STEMI/unstable angina, will receive Plavix POD2  - SQH tomorrow   - SCDs for DVT prophylaxis.  - Last type and screen: 3/24     ID:  Afebrile, no current indications of infection. MRSA negative.-->  - Trend temp q4h  - Periop cefazolin x 48hrs     Skin:  No active skin issues.  - preventative Mepilex dressings in place on sacrum and heels  - change preventative Mepilex weekly or more frequently as indicated (when moist/soiled)   - every shift skin assessment per nursing and weekly ICU skin rounds  - moisture barrier to be applied with doyle care  - active skin problems addressed with nursing on daily rounds     Proph:  SCDs  PPI     G:  Line  Right IJ MAC w Minimac placed 3/26/25   Left brachial a-line placed 3/26/25         Code status: Full Code  Emergency contact: Extended Emergency Contact Information  Primary Emergency Contact: GARZAQUETA  Address: [address] [place] [address]  Home Phone: [phone]  Relation: Daughter  Preferred language: English   needed? No         A,B,C,D,E,F,G: reviewed     Dispo: CTICU care for now.    CTICU TEAM PHONE [phone]     Juan Carlos Jenkins DO   Emergency Medicine PGY-2

## 2025-03-27 NOTE — PROGRESS NOTES
Physical Therapy    Physical Therapy Evaluation & Treatment    Patient Name: Cornelia Macias  MRN: [id number]  Department: [facility] CTICU  Room: 17/17-A  Today's Date: 3/27/2025   Time Calculation  Start Time: 1133  Stop Time: 1226  Time Calculation (min): 53 min    Assessment/Plan   PT Assessment  PT Assessment Results: Decreased strength, Decreased endurance, Impaired balance, Decreased mobility, Decreased cognition  Rehab Prognosis: Good  Barriers to Discharge Home: No anticipated barriers  Evaluation/Treatment Tolerance: Patient tolerated treatment well  Medical Staff Made Aware: Yes  End of Session Communication: Bedside nurse  Assessment Comment: Pt is questionable historian. Pt performed static sitting edge of chair and EOB x 10 min, static standing with BUE support on thoracic walker x 5 min, sit<>stand transfers with Delfino and verbal cues for hand placement, ambulated 300' with thoracic walker and Delfino for device management and to facilitate forward propulsion, and bed mobility sit>supine Delfino for BLE management. Pt appeared confused during session and frequently spoke nonsensically. VS remained stable other than 2 episodes of HTN (see VS for comments). Pt will benefit from continued skilled therapy to address remaining impairments and return to PLOF for safe discharge home.  End of Session Patient Position: Bed, 3 rail up, Alarm off, not on at start of session   IP OR SWING BED PT PLAN  Inpatient or Swing Bed: Inpatient  PT Plan  Treatment/Interventions: Bed mobility, Transfer training, Gait training, Stair training, Balance training, Neuromuscular re-education, Strengthening, Endurance training, Range of motion, Therapeutic exercise, Therapeutic activity  PT Plan: Ongoing PT  PT Frequency: 3 times per week  PT Discharge Recommendations: Low intensity level of continued care  PT Recommended Transfer Status: Assist x1, Contact guard  PT - OK to Discharge: Yes    Subjective     General Visit  Information:  General  Reason for Referral: s/p CABGx3 on 3/26  Referred By: Dr. Lopez  Past Medical History Relevant to Rehab: CHF, HLD, DM2, CAD, depression, Hepatitis C tx Mavyret 2018 with SVR (hepatitis C is cured, with last RNA in 2021 undetectable)  Family/Caregiver Present: No  Prior to Session Communication: Bedside nurse  Patient Position Received: Up in chair, Alarm off, not on at start of session  Preferred Learning Style: auditory, verbal, kinesthetic  General Comment: Pt alert, pleasant, and agreeable to PT session.  Home Living:  Home Living  Type of Home: House  Lives With: Adult children  Home Layout: Two level, Able to live on main level with bedroom/bathroom  Home Access: Stairs to enter with rails  Entrance Stairs-Number of Steps: 4  Bathroom Shower/Tub: Tub/shower unit  Home Living Comments: Daughter can assist upon d/c home.  Prior Level of Function:  Prior Function Per Pt/Caregiver Report  Level of Ida: Independent with ADLs and functional transfers, Independent with homemaking with ambulation  ADL Assistance: Independent  Homemaking Assistance: Independent  Ambulatory Assistance: Independent  Vocational: Unemployed  Prior Function Comments: (-) drive, (+) fall - unable to report when fall happened.  Precautions:  Precautions  Hearing/Visual Limitations: WFLs  Medical Precautions: Cardiac precautions, Fall precautions, Chest tube  Post-Surgical Precautions: Move in the Tube  Precautions Comment: MAP 75-95, VVI@50, SpO2>92%     03/27/25 1133 03/27/25 1226   Vital Signs   Vitals Session Pre PT Post PT   Heart Rate 85 85   Heart Rate Source Monitor Monitor   Resp (!) 27 25   SpO2 98 % 96 %   /62 133/64   MAP (mmHg) 90 92   BP Location Left arm Left arm   BP Method Arterial line Arterial line   Patient Position Sitting Lying   Vital Signs Comment Pt had 2 episodes of hypertension during session. All other VS remained stable. RN aware.  --      Objective   Pain:  Pain  Assessment  Pain Assessment: 0-10  0-10 (Numeric) Pain Score: 7  Pain Type: Surgical pain  Pain Location: Chest  Pain Interventions: Medication (See MAR)  Cognition:  Cognition  Overall Cognitive Status: Impaired (Pt A+Ox4 however was very confused and speaking nonsensically at times during session.)  Orientation Level: Oriented X4  Attention: Within Functional Limits    General Assessments:  General Observation  General Observation: IVx3, CVC, A-line, patino, CTx2, tele, clevidipine 1mg     Activity Tolerance  Endurance: Tolerates 10 - 20 min exercise with multiple rests  Early Mobility/Exercise Safety Screen: Proceed with mobilization - No exclusion criteria met  Activity Tolerance Comments: Pt tolerates all functional mobility activities despite c/o high pain levels.    Sensation  Light Touch: No apparent deficits    Strength  Strength Comments: BLE at least 3/5 evidenced by functional mobility.  Coordination  Movements are Fluid and Coordinated: Yes    Postural Control  Postural Control: Within Functional Limits    Static Sitting Balance  Static Sitting-Balance Support: Bilateral upper extremity supported, Feet supported  Static Sitting-Level of Assistance: Close supervision  Static Sitting-Comment/Number of Minutes: edge of chair and EOB x 10 min for line/tube management and VS monitoring.    Static Standing Balance  Static Standing-Balance Support: Bilateral upper extremity supported  Static Standing-Level of Assistance: Contact guard  Static Standing-Comment/Number of Minutes: BUE support on thoracic walker x 5 min to assess VS prior to ambulating.  Functional Assessments:  ADL  ADL's Addressed: No    Bed Mobility  Bed Mobility: Yes  Bed Mobility 1  Bed Mobility 1: Sitting to supine  Level of Assistance 1: Minimum assistance  Bed Mobility Comments 1: minAx1 to bring BLE onto bed.    Transfers  Transfer: Yes  Transfer 1  Transfer From 1: Sit to, Stand to  Transfer to 1: Sit, Stand  Technique 1: Sit to stand,  Stand to sit  Transfer Device 1: Thoracic walker  Transfer Level of Assistance 1: Minimum assistance  Trials/Comments 1: sit>stand with Delfino, multiple attempts to rise to full stand, verbal cues for hand placement.  Stand>sit Delfino for eccentric control and hand placement.    Ambulation/Gait Training  Ambulation/Gait Training Performed: Yes  Ambulation/Gait Training 1  Surface 1: Level tile  Device 1: Thoracic walker  Assistance 1: Minimum assistance  Quality of Gait 1: Narrow base of support, Inconsistent stride length, Decreased step length, Shuffling gait, Forward flexed posture  Comments/Distance (ft) 1: 10 feet in room with thoracic walker and Delfino to safely navigate around environmental obstacles.  Ambulation/Gait Training 2  Surface 2: Level tile  Device 2: Thoracic walker  Assistance 2: Minimum assistance  Quality of Gait 2: Narrow base of support, Inconsistent stride length, Decreased step length, Shuffling gait, Forward flexed posture  Comments/Distance (ft) 2: 300 feet with thoracic walker and Delfino to steer walker as well as to continue forward propulsion. Pt takes several standing rest breaks during ambulation. Frequent redirection to task required. Chair follow from second person due to pt frequently closing eyes, yelling out in pain, and appearing faint. No episodes of LOB noted, VS remained stable.    Stairs  Stairs: No  Extremity/Trunk Assessments:  RUE   RUE : Within Functional Limits  LUE   LUE: Within Functional Limits  RLE   RLE : Within Functional Limits  LLE   LLE : Within Functional Limits  Treatments:  Therapeutic Activity  Therapeutic Activity Performed: Yes  Therapeutic Activity 1: Increased time required for skilled line/tube management.  Therapeutic Activity 2: Education on MITT precautions.  Therapeutic Activity 3: Static sitting EOB x 10 min for line/tube management.  Therapeutic Activity 4: Static standing with BUE support on thoracic walker x 5 min to assess VS prior to  ambulating.  Therapeutic Activity 5: Increased time required for redirection to tasks. Pt is very talkative and often confused requiring verbal cues to complete functional mobility tasks.    Ambulation/Gait Training  Ambulation/Gait Training Performed: Yes  Ambulation/Gait Training 1  Surface 1: Level tile  Device 1: Thoracic walker  Assistance 1: Minimum assistance  Quality of Gait 1: Narrow base of support, Inconsistent stride length, Decreased step length, Shuffling gait, Forward flexed posture  Comments/Distance (ft) 1: 10 feet in room with thoracic walker and Delfino to safely navigate around environmental obstacles.  Ambulation/Gait Training 2  Surface 2: Level tile  Device 2: Thoracic walker  Assistance 2: Minimum assistance  Quality of Gait 2: Narrow base of support, Inconsistent stride length, Decreased step length, Shuffling gait, Forward flexed posture  Comments/Distance (ft) 2: 300 feet with thoracic walker and Delfino to steer walker as well as to continue forward propulsion. Pt takes several standing rest breaks during ambulation. Frequent redirection to task required. Chair follow from second person due to pt frequently closing eyes, yelling out in pain, and appearing faint. No episodes of LOB noted, VS remained stable.  Outcome Measures:  [facility] Basic Mobility  Turning from your back to your side while in a flat bed without using bedrails: A little  Moving from lying on your back to sitting on the side of a flat bed without using bedrails: A little  Moving to and from bed to chair (including a wheelchair): A little  Standing up from a chair using your arms (e.g. wheelchair or bedside chair): A little  To walk in hospital room: A little  Climbing 3-5 steps with railing: A lot  Basic Mobility - Total Score: 17    FSS-ICU  Ambulation: Walks >/ or equal to 150 feet with minimal assistance x1  Rolling: Minimal assistance (performs 75% or more of task)  Sitting: Supervision or set-up only  Transfer Sit-to-Stand:  Minimal assistance (performs 75% or more of task)  Transfer Supine-to-Sit: Minimal assistance (performs 75% or more of task)  Total Score: 21    Early Mobility/Exercise Safety Screen: Proceed with mobilization - No exclusion criteria met  ICU Mobility Scale: Walking with assistance of 2 or more people [7]    Encounter Problems       Encounter Problems (Active)       Balance       Patient will demonstrate ability to score at least 24/28 on the Tinetti balance assessment tool to ensure safety upon discharge.  (Progressing)       Start:  03/27/25    Expected End:  04/10/25               Mobility       Pt will demonstrate ability to ambulate >/=800 feet with LRAD and no balance deficits to facilitate safe discharge home.  (Progressing)       Start:  03/27/25    Expected End:  04/10/25            Pt will demonstrate ability to ascend/descend 4 stairs with 1 HR and no balance deficits for safe discharge home.  (Progressing)       Start:  03/27/25    Expected End:  04/10/25               PT Transfers       Pt will demonstrate ability to perform 5X STS from chair without UE assist in < 15 seconds with stable VS for improved functional mobility.   (Progressing)       Start:  03/27/25    Expected End:  04/10/25                   Education Documentation  Precautions, taught by JERRY Oakes at 3/27/2025  2:06 PM.  Learner: Patient  Readiness: Acceptance  Method: Explanation  Response: Verbalizes Understanding    Body Mechanics, taught by JERRY Oakes at 3/27/2025  2:06 PM.  Learner: Patient  Readiness: Acceptance  Method: Explanation  Response: Verbalizes Understanding    Mobility Training, taught by JERRY Oakes at 3/27/2025  2:06 PM.  Learner: Patient  Readiness: Acceptance  Method: Explanation  Response: Verbalizes Understanding    Education Comments  No comments found.

## 2025-03-27 NOTE — SIGNIFICANT EVENT
03/27/25 0223   Daily Screen   Total RSBI 59   Weaning Parameters   Spontaneous Minute Volume (MV) 4.5   Weaning Tidal Volume 247 mL   Respiratory Depth/Rhythm Regular  (RR 18)   Respiratory Effort Unlabored   Weaning Tolerance Fair

## 2025-03-28 ENCOUNTER — APPOINTMENT (OUTPATIENT)
Dept: RADIOLOGY | Facility: HOSPITAL | Age: 65
DRG: 234 | End: 2025-03-28
Payer: COMMERCIAL

## 2025-03-28 LAB
ALBUMIN SERPL BCP-MCNC: 3.7 G/DL (ref 3.4–5)
ANION GAP SERPL CALC-SCNC: 14 MMOL/L (ref 10–20)
BLOOD EXPIRATION DATE: NORMAL
BUN SERPL-MCNC: 35 MG/DL (ref 6–23)
CA-I BLD-SCNC: 1.18 MMOL/L (ref 1.1–1.33)
CALCIUM SERPL-MCNC: 8.5 MG/DL (ref 8.6–10.6)
CHLORIDE SERPL-SCNC: 107 MMOL/L (ref 98–107)
CO2 SERPL-SCNC: 22 MMOL/L (ref 21–32)
CREAT SERPL-MCNC: 2.1 MG/DL (ref 0.5–1.05)
DISPENSE STATUS: NORMAL
EGFRCR SERPLBLD CKD-EPI 2021: 26 ML/MIN/1.73M*2
ERYTHROCYTE [DISTWIDTH] IN BLOOD BY AUTOMATED COUNT: 15.5 % (ref 11.5–14.5)
GLUCOSE BLD MANUAL STRIP-MCNC: 148 MG/DL (ref 74–99)
GLUCOSE BLD MANUAL STRIP-MCNC: 148 MG/DL (ref 74–99)
GLUCOSE BLD MANUAL STRIP-MCNC: 166 MG/DL (ref 74–99)
GLUCOSE BLD MANUAL STRIP-MCNC: 98 MG/DL (ref 74–99)
GLUCOSE SERPL-MCNC: 105 MG/DL (ref 74–99)
HCT VFR BLD AUTO: 28.1 % (ref 36–46)
HGB BLD-MCNC: 8.9 G/DL (ref 12–16)
MAGNESIUM SERPL-MCNC: 2.96 MG/DL (ref 1.6–2.4)
MCH RBC QN AUTO: 30 PG (ref 26–34)
MCHC RBC AUTO-ENTMCNC: 31.7 G/DL (ref 32–36)
MCV RBC AUTO: 95 FL (ref 80–100)
NRBC BLD-RTO: 0 /100 WBCS (ref 0–0)
PHOSPHATE SERPL-MCNC: 4.2 MG/DL (ref 2.5–4.9)
PLATELET # BLD AUTO: 90 X10*3/UL (ref 150–450)
POTASSIUM SERPL-SCNC: 4.1 MMOL/L (ref 3.5–5.3)
PRODUCT BLOOD TYPE: 6200
PRODUCT CODE: NORMAL
RBC # BLD AUTO: 2.97 X10*6/UL (ref 4–5.2)
SODIUM SERPL-SCNC: 139 MMOL/L (ref 136–145)
UNIT ABO: NORMAL
UNIT NUMBER: NORMAL
UNIT RH: NORMAL
UNIT VOLUME: 350
WBC # BLD AUTO: 10.2 X10*3/UL (ref 4.4–11.3)
XM INTEP: NORMAL

## 2025-03-28 PROCEDURE — 2500000001 HC RX 250 WO HCPCS SELF ADMINISTERED DRUGS (ALT 637 FOR MEDICARE OP)

## 2025-03-28 PROCEDURE — 2500000004 HC RX 250 GENERAL PHARMACY W/ HCPCS (ALT 636 FOR OP/ED): Performed by: NURSE PRACTITIONER

## 2025-03-28 PROCEDURE — 97530 THERAPEUTIC ACTIVITIES: CPT | Mod: GP

## 2025-03-28 PROCEDURE — 37799 UNLISTED PX VASCULAR SURGERY: CPT

## 2025-03-28 PROCEDURE — 82947 ASSAY GLUCOSE BLOOD QUANT: CPT

## 2025-03-28 PROCEDURE — 2500000001 HC RX 250 WO HCPCS SELF ADMINISTERED DRUGS (ALT 637 FOR MEDICARE OP): Performed by: NURSE PRACTITIONER

## 2025-03-28 PROCEDURE — 2500000005 HC RX 250 GENERAL PHARMACY W/O HCPCS: Performed by: NURSE PRACTITIONER

## 2025-03-28 PROCEDURE — 2500000002 HC RX 250 W HCPCS SELF ADMINISTERED DRUGS (ALT 637 FOR MEDICARE OP, ALT 636 FOR OP/ED)

## 2025-03-28 PROCEDURE — 83735 ASSAY OF MAGNESIUM: CPT

## 2025-03-28 PROCEDURE — 85027 COMPLETE CBC AUTOMATED: CPT | Performed by: STUDENT IN AN ORGANIZED HEALTH CARE EDUCATION/TRAINING PROGRAM

## 2025-03-28 PROCEDURE — 2500000004 HC RX 250 GENERAL PHARMACY W/ HCPCS (ALT 636 FOR OP/ED)

## 2025-03-28 PROCEDURE — 82330 ASSAY OF CALCIUM: CPT

## 2025-03-28 PROCEDURE — 71045 X-RAY EXAM CHEST 1 VIEW: CPT

## 2025-03-28 PROCEDURE — 97535 SELF CARE MNGMENT TRAINING: CPT | Mod: GO

## 2025-03-28 PROCEDURE — 2500000004 HC RX 250 GENERAL PHARMACY W/ HCPCS (ALT 636 FOR OP/ED): Performed by: STUDENT IN AN ORGANIZED HEALTH CARE EDUCATION/TRAINING PROGRAM

## 2025-03-28 PROCEDURE — 80069 RENAL FUNCTION PANEL: CPT

## 2025-03-28 PROCEDURE — 97165 OT EVAL LOW COMPLEX 30 MIN: CPT | Mod: GO

## 2025-03-28 PROCEDURE — 97116 GAIT TRAINING THERAPY: CPT | Mod: GP

## 2025-03-28 PROCEDURE — 1200000002 HC GENERAL ROOM WITH TELEMETRY DAILY

## 2025-03-28 PROCEDURE — 94640 AIRWAY INHALATION TREATMENT: CPT

## 2025-03-28 PROCEDURE — 71045 X-RAY EXAM CHEST 1 VIEW: CPT | Performed by: RADIOLOGY

## 2025-03-28 PROCEDURE — 99291 CRITICAL CARE FIRST HOUR: CPT | Performed by: STUDENT IN AN ORGANIZED HEALTH CARE EDUCATION/TRAINING PROGRAM

## 2025-03-28 PROCEDURE — 92526 ORAL FUNCTION THERAPY: CPT | Mod: GN | Performed by: SPEECH-LANGUAGE PATHOLOGIST

## 2025-03-28 PROCEDURE — 92612 ENDOSCOPY SWALLOW (FEES) VID: CPT | Mod: GN | Performed by: SPEECH-LANGUAGE PATHOLOGIST

## 2025-03-28 RX ORDER — ACETAMINOPHEN 325 MG/1
650 TABLET ORAL EVERY 8 HOURS
Status: DISCONTINUED | OUTPATIENT
Start: 2025-03-28 | End: 2025-04-01 | Stop reason: HOSPADM

## 2025-03-28 RX ORDER — FUROSEMIDE 10 MG/ML
40 INJECTION INTRAMUSCULAR; INTRAVENOUS ONCE
Status: COMPLETED | OUTPATIENT
Start: 2025-03-28 | End: 2025-03-28

## 2025-03-28 RX ORDER — ACETAMINOPHEN 10 MG/ML
1000 INJECTION, SOLUTION INTRAVENOUS EVERY 6 HOURS
Status: DISCONTINUED | OUTPATIENT
Start: 2025-03-28 | End: 2025-03-28

## 2025-03-28 RX ORDER — ASPIRIN 81 MG/1
81 TABLET ORAL DAILY
Status: DISCONTINUED | OUTPATIENT
Start: 2025-03-29 | End: 2025-04-01 | Stop reason: HOSPADM

## 2025-03-28 RX ORDER — LABETALOL HYDROCHLORIDE 5 MG/ML
20 INJECTION, SOLUTION INTRAVENOUS EVERY 4 HOURS PRN
Status: DISCONTINUED | OUTPATIENT
Start: 2025-03-28 | End: 2025-04-01 | Stop reason: HOSPADM

## 2025-03-28 RX ORDER — IRON POLYSACCHARIDE COMPLEX 150 MG
150 CAPSULE ORAL DAILY
Status: DISCONTINUED | OUTPATIENT
Start: 2025-03-29 | End: 2025-04-01 | Stop reason: HOSPADM

## 2025-03-28 RX ORDER — CARVEDILOL 3.12 MG/1
6.25 TABLET ORAL 2 TIMES DAILY
Status: DISCONTINUED | OUTPATIENT
Start: 2025-03-28 | End: 2025-03-29

## 2025-03-28 RX ORDER — BISACODYL 10 MG/1
10 SUPPOSITORY RECTAL DAILY PRN
Status: DISCONTINUED | OUTPATIENT
Start: 2025-03-28 | End: 2025-04-01 | Stop reason: HOSPADM

## 2025-03-28 RX ORDER — INSULIN LISPRO 100 [IU]/ML
0-10 INJECTION, SOLUTION INTRAVENOUS; SUBCUTANEOUS
Status: DISCONTINUED | OUTPATIENT
Start: 2025-03-28 | End: 2025-04-01 | Stop reason: HOSPADM

## 2025-03-28 RX ORDER — LABETALOL HYDROCHLORIDE 5 MG/ML
10 INJECTION, SOLUTION INTRAVENOUS EVERY 4 HOURS PRN
Status: DISCONTINUED | OUTPATIENT
Start: 2025-03-28 | End: 2025-03-28

## 2025-03-28 RX ORDER — HYDRALAZINE HYDROCHLORIDE 20 MG/ML
10 INJECTION INTRAMUSCULAR; INTRAVENOUS EVERY 4 HOURS PRN
Status: DISCONTINUED | OUTPATIENT
Start: 2025-03-28 | End: 2025-04-01 | Stop reason: HOSPADM

## 2025-03-28 RX ORDER — NAPROXEN SODIUM 220 MG/1
81 TABLET, FILM COATED ORAL DAILY
Status: DISCONTINUED | OUTPATIENT
Start: 2025-03-28 | End: 2025-03-28

## 2025-03-28 RX ADMIN — SENNOSIDES AND DOCUSATE SODIUM 2 TABLET: 50; 8.6 TABLET ORAL at 11:18

## 2025-03-28 RX ADMIN — SENNOSIDES AND DOCUSATE SODIUM 2 TABLET: 50; 8.6 TABLET ORAL at 21:57

## 2025-03-28 RX ADMIN — ASPIRIN 81 MG: 81 TABLET, CHEWABLE ORAL at 11:18

## 2025-03-28 RX ADMIN — HEPARIN SODIUM 5000 UNITS: 5000 INJECTION, SOLUTION INTRAVENOUS; SUBCUTANEOUS at 09:57

## 2025-03-28 RX ADMIN — CEFAZOLIN SODIUM 2 G: 2 INJECTION, SOLUTION INTRAVENOUS at 02:12

## 2025-03-28 RX ADMIN — CHOLECALCIFEROL TAB 25 MCG (1000 UNIT) 1000 UNITS: 25 TAB at 11:18

## 2025-03-28 RX ADMIN — Medication 21 L/MIN: at 16:06

## 2025-03-28 RX ADMIN — CEFAZOLIN SODIUM 2 G: 2 INJECTION, SOLUTION INTRAVENOUS at 14:57

## 2025-03-28 RX ADMIN — ACETAMINOPHEN 650 MG: 325 TABLET ORAL at 11:19

## 2025-03-28 RX ADMIN — ACETAMINOPHEN 650 MG: 325 TABLET ORAL at 21:58

## 2025-03-28 RX ADMIN — INSULIN LISPRO 5 UNITS: 100 INJECTION, SOLUTION INTRAVENOUS; SUBCUTANEOUS at 07:38

## 2025-03-28 RX ADMIN — CARVEDILOL 6.25 MG: 6.25 TABLET, FILM COATED ORAL at 21:58

## 2025-03-28 RX ADMIN — CARVEDILOL 6.25 MG: 6.25 TABLET, FILM COATED ORAL at 11:18

## 2025-03-28 RX ADMIN — HYDROMORPHONE HYDROCHLORIDE 0.4 MG: 1 INJECTION, SOLUTION INTRAMUSCULAR; INTRAVENOUS; SUBCUTANEOUS at 00:37

## 2025-03-28 RX ADMIN — HYDROMORPHONE HYDROCHLORIDE 0.4 MG: 1 INJECTION, SOLUTION INTRAMUSCULAR; INTRAVENOUS; SUBCUTANEOUS at 08:06

## 2025-03-28 RX ADMIN — HYDRALAZINE HYDROCHLORIDE 10 MG: 20 INJECTION INTRAMUSCULAR; INTRAVENOUS at 04:44

## 2025-03-28 RX ADMIN — FUROSEMIDE 40 MG: 10 INJECTION, SOLUTION INTRAMUSCULAR; INTRAVENOUS at 09:57

## 2025-03-28 RX ADMIN — LABETALOL HYDROCHLORIDE 10 MG: 5 INJECTION INTRAVENOUS at 02:32

## 2025-03-28 RX ADMIN — POLYETHYLENE GLYCOL 3350 17 G: 17 POWDER, FOR SOLUTION ORAL at 11:19

## 2025-03-28 RX ADMIN — HYDRALAZINE HYDROCHLORIDE 10 MG: 20 INJECTION INTRAMUSCULAR; INTRAVENOUS at 11:34

## 2025-03-28 RX ADMIN — LABETALOL HYDROCHLORIDE 20 MG: 5 INJECTION, SOLUTION INTRAVENOUS at 21:57

## 2025-03-28 RX ADMIN — HEPARIN SODIUM 5000 UNITS: 5000 INJECTION, SOLUTION INTRAVENOUS; SUBCUTANEOUS at 17:23

## 2025-03-28 RX ADMIN — HEPARIN SODIUM 5000 UNITS: 5000 INJECTION, SOLUTION INTRAVENOUS; SUBCUTANEOUS at 00:37

## 2025-03-28 RX ADMIN — ATORVASTATIN CALCIUM 80 MG: 80 TABLET, FILM COATED ORAL at 21:57

## 2025-03-28 RX ADMIN — OXYCODONE HYDROCHLORIDE 10 MG: 10 TABLET ORAL at 19:39

## 2025-03-28 RX ADMIN — ACETAMINOPHEN 1000 MG: 10 INJECTION INTRAVENOUS at 04:30

## 2025-03-28 RX ADMIN — LABETALOL HYDROCHLORIDE 10 MG: 5 INJECTION INTRAVENOUS at 06:59

## 2025-03-28 ASSESSMENT — COGNITIVE AND FUNCTIONAL STATUS - GENERAL
TURNING FROM BACK TO SIDE WHILE IN FLAT BAD: A LITTLE
CLIMB 3 TO 5 STEPS WITH RAILING: A LITTLE
PERSONAL GROOMING: A LITTLE
HELP NEEDED FOR BATHING: A LITTLE
WALKING IN HOSPITAL ROOM: A LITTLE
STANDING UP FROM CHAIR USING ARMS: A LITTLE
MOVING TO AND FROM BED TO CHAIR: A LITTLE
MOBILITY SCORE: 22
DRESSING REGULAR LOWER BODY CLOTHING: A LITTLE
MOBILITY SCORE: 17
TOILETING: A LITTLE
DAILY ACTIVITIY SCORE: 24
WALKING IN HOSPITAL ROOM: A LITTLE
DAILY ACTIVITIY SCORE: 20
MOVING FROM LYING ON BACK TO SITTING ON SIDE OF FLAT BED WITH BEDRAILS: A LITTLE
CLIMB 3 TO 5 STEPS WITH RAILING: A LOT

## 2025-03-28 ASSESSMENT — PAIN - FUNCTIONAL ASSESSMENT
PAIN_FUNCTIONAL_ASSESSMENT: 0-10

## 2025-03-28 ASSESSMENT — ACTIVITIES OF DAILY LIVING (ADL)
HOME_MANAGEMENT_TIME_ENTRY: 11
ADL_ASSISTANCE: INDEPENDENT
BATHING_ASSISTANCE: MINIMAL

## 2025-03-28 ASSESSMENT — PAIN DESCRIPTION - LOCATION
LOCATION: CHEST

## 2025-03-28 ASSESSMENT — PAIN DESCRIPTION - ORIENTATION
ORIENTATION: MID
ORIENTATION: MID

## 2025-03-28 ASSESSMENT — PAIN SCALES - GENERAL
PAINLEVEL_OUTOF10: 9
PAINLEVEL_OUTOF10: 4
PAINLEVEL_OUTOF10: 8
PAINLEVEL_OUTOF10: 3
PAINLEVEL_OUTOF10: 2
PAINLEVEL_OUTOF10: 3
PAINLEVEL_OUTOF10: 8
PAINLEVEL_OUTOF10: 3
PAINLEVEL_OUTOF10: 0 - NO PAIN

## 2025-03-28 NOTE — PROGRESS NOTES
"CTICU Progress Note  Cornelia Macias/[id number]    Admit Date: 3/19/2025  Hospital Length of Stay: 9   ICU Length of Stay: 1d 20h   CT SURGEON:     SUBJECTIVE:   -NAOE    MEDICATIONS  Infusions:  lactated Ringer's, Last Rate: Stopped (03/28/25 0621)  lactated Ringer's, Last Rate: 5 mL/hr (03/28/25 0700)      Scheduled:  acetaminophen, 650 mg, q6h  aspirin, 81 mg, Daily  atorvastatin, 80 mg, Nightly  carvedilol, 6.25 mg, BID  ceFAZolin, 2 g, q12h  cholecalciferol, 1,000 Units, Daily  heparin (porcine), 5,000 Units, q8h  insulin lispro, 0-15 Units, Before meals & nightly  polyethylene glycol, 17 g, BID  sennosides-docusate sodium, 2 tablet, BID      PRN:  calcium gluconate, 1 g, q6h PRN  calcium gluconate, 2 g, q6h PRN  hydrALAZINE, 10 mg, q4h PRN  labetaloL, 20 mg, q4h PRN  magnesium sulfate, 2 g, q6h PRN  magnesium sulfate, 4 g, q6h PRN  naloxone, 0.2 mg, q5 min PRN  ondansetron, 4 mg, q8h PRN   Or  ondansetron, 4 mg, q8h PRN  oxyCODONE, 10 mg, q4h PRN  oxyCODONE, 5 mg, q4h PRN  oxygen, , Continuous PRN - O2/gases  potassium chloride CR, 20 mEq, q6h PRN   Or  potassium chloride, 20 mEq, q6h PRN  potassium chloride CR, 40 mEq, q6h PRN   Or  potassium chloride, 40 mEq, q6h PRN  potassium chloride, 20 mEq, q6h PRN  potassium chloride, 40 mEq, q6h PRN        PHYSICAL EXAM:   Visit Vitals  /77   Pulse 96   Temp 36.9 °C (98.4 °F) (Temporal)   Resp 21   Ht 1.575 m (5' 2\")   Wt 58.5 kg (128 lb 15.5 oz)   SpO2 97%   BMI 23.59 kg/m²   Smoking Status Every Day   BSA 1.6 m²     Wt Readings from Last 5 Encounters:   03/26/25 58.5 kg (128 lb 15.5 oz)   03/18/25 60.5 kg (133 lb 6.4 oz)   10/23/24 58.6 kg (129 lb 3.2 oz)   10/03/24 59.5 kg (131 lb 3.2 oz)   09/23/24 58.3 kg (128 lb 9.6 oz)     INTAKE/OUTPUT:  I/O last 3 completed shifts:  In: 2548.4 (43.6 mL/kg) [I.V.:1215.7 (20.8 mL/kg); IV Piggyback:1332.7]  Out: 2085 (35.6 mL/kg) [Urine:1530 (0.7 mL/kg/hr); Chest Tube:555]  Weight: 58.5 kg          Vent settings:   "     Physical Exam:   - CONSTITUTION: Awake sitting in chair.  - NEUROLOGIC: Alert and oriented x3, pupils round equal and reactive bilaterally, moving all extremities against gravity, no focal neurological deficits.  - CARDIOVASCULAR: NSR. Regular rate  - RESPIRATORY: CTAB. Equal chest rise and fall. No respiratory distress  Chest: Chest tubes in place. No evidence of surrounding infection. Appropriately dressed.  - GI: Abdomen is soft, non distended, and non tender  - : Leon in place with yellow draining urine  - EXTREMITIES: Moving all extremities. Warm. Capillary refill less than 2 seconds.  - SKIN: Warm and dry. Surgical sites appropriately dressed.   - PSYCHIATRIC: Appropriate mood and affect     Daily Risk Screen   Central line: Hemodynamic instability requiring parenteral medication   Leon: Accurate I/Os in critically ill patient    Images:     Invasive Hemodynamics:    Most Recent Range Past 24hrs   BP (Art) 162/81 Arterial Line BP 1  Min: 103/57  Max: 162/81   MAP(Art) 112 mmHg Arterial Line MAP 1 (mmHg)   Min: 75 mmHg  Max: 112 mmHg   RA/CVP   No data recorded   PA   No data recorded   PA(mean)   No data recorded   CO   No data recorded   CI   No data recorded   Mixed Venous   No data recorded   SVR    No data recorded         Assessment/Plan     Ms. Cornelia Macias is a 64 y.o. female history notable for CHF, HLD, DM2, CAD, depression, Hepatitis C tx Mavyret 2018 with SVR (hepatitis C is cured, with last RNA in 2021 undetectable), now s/p CABGx3 with Dr. Lopez. Now s/p CABGx3 (LIMA-LAD, SVG-OM, SVG-PDA), KATHERINE clip, posterior pericardial window on 3/26 with Dr. Lopez.     Assessment:     Plan:  NEURO:  PMHx none. Patient is intubated and sedated on propofol infusion. Acute post operative pain.   -->  - Serial neuro and pain assessments   - Start PO tylenol  -Start PRN oxycodone  - PRN dilaudid breakthrough  - PT Consult, OOB to chair as tolerated, chair position if not tolerated   - CAM ICU score  qshift  - Sleep/wake cycle hygiene     CV:  Patient has a history of CHF, HLD, CAD Is now status post CABGx3 Pre/Post EF: 50-55% Arrived to CTICU on 0.02 epi. Brief period of HTN with MAPs in 130s, responded well to Cleviprex ggt. A/V epicardial wires set VVI @ 50.-->  -Start home coreg   -Start lebatalol 20mg q6HR for MAP greater than 90  -Lasix 40mg  -Hydralazine q6hr for MAP greater than 90  - Maintain goal MAP 75-95  - Mixed venous and CI Q4H  - Volume resuscitate as clinically indicated  - Maintain epicardial wires set VVI @ 50  - Start statin  - Hold home Imdur, valsartan     PULM:  PMHx 25 pack year smoker, possible REJI, COPD.  Currently intubated on ventilator. Chest tubes 2x pleural, 1 med.-->  -Pull pleural chest tubes  - Daily CXR  - Wean FiO2 maintaining SpO2 >92%.   - IS q1h and OOB to chair when extubated  - Chest tubes to wall suction.     GI:  PMH Hep C (cured, RNA negative 2021).  OG in place.-->. Failed bedside swallow.   -Speech therapy recs: Easy chew soft diet alternating with sips. Okay for whole pills. Arytenoid dysfunction visualized.   - Colace/senna BID and miralax BID     :  CSA-SERG Risk Score high.  PMHx CKD4, baseline creatinine 2.0. Creatinine stable post-op. Patino in place and making adequate UOP. -->  - Continue patino catheter for strict I/Os.  - Goal UOP 0.5ml/kg/hr  - RFP as clinically indicated  - Replete electrolytes per CTICU protocol     ENDO:  PMH of DMT2 A1c: 6.5-->  - Maintain BG <180, insulin per CTICU protocol  - Hold home jardiance     HEME:  Acute blood loss anemia and thrombocytopenia.-->    - Monitor drain output volume and characteristics  - CBC, coags, and fibrinogen post op and as clinically indicated  - Start ASA 6hrs post-op for CABG  - If CABG is 2/2 STEMI/unstable angina, will receive Plavix POD2  - SQH   - SCDs for DVT prophylaxis.  - Last type and screen: 3/24     ID:  Afebrile, no current indications of infection. MRSA negative.-->  - Trend temp q4h  -  Periop cefazolin x 48hrs     Skin:  No active skin issues.  - preventative Mepilex dressings in place on sacrum and heels  - change preventative Mepilex weekly or more frequently as indicated (when moist/soiled)   - every shift skin assessment per nursing and weekly ICU skin rounds  - moisture barrier to be applied with doyle care  - active skin problems addressed with nursing on daily rounds     Proph:  SCDs  PPI  SQH    G: PIV        Code status: Full Code  Emergency contact: Extended Emergency Contact Information  Primary Emergency Contact: QUETA GARZA  Address: [address] [place] [address]  Home Phone: [phone]  Relation: Daughter  Preferred language: English   needed? No         A,B,C,D,E,F,G: reviewed     Dispo: CTICU care for now.    CTICU TEAM PHONE [phone]     Juan Carlos Jenkins DO   Emergency Medicine PGY-2

## 2025-03-28 NOTE — PROGRESS NOTES
Speech-Language Pathology  Adult Inpatient Swallow Treatment    Patient Name: Cornelia Macias  MRN: [id number]  Today's Date: 3/28/2025   Start Time: 853  Stop Time: 908  Time Calculation (min): 15    Impression:   Re-evaluation of swallow completed. Pt re-positioned upright in bed. Trials of ice chips x4 and single sips x5 are without difficulty. [facility] protocol; pt consumes approximately 2 oz of water in continuous sequential swallow followed by a post prandial cough. Concern for pharyngeal dysphagia therefore defer further PO trials. FEES to be completed to fully evaluate swallow function prior to any PO recommendations.     Discuss results and recommendations with RN and MD.    [facility] Swallow Protocol:    PASS: Complete and uninterrupted drinking of all 3 ounces (90cc) of water without overt signs of aspiration (I.e. coughing or choking, either during or immediate after completion)  *The [facility] Swallow Protocol (YSP) is a standardized measure with high sensitivity(96.5%)  and negative prediction value (97.9%)       Recommendations:  NPO    Frequent, aggressive oral care is strongly recommended to improve infection control as well as reduce dental plaque and bacteria on oropharyngeal surfaces which may increase the risk nosocomial infections, including pneumonia.    OK for small amounts of ice chips (one at a time, 10x/hour) for pleasure/swallow stimulation, but only after aggressive oral care to avoid colonization of bacteria within oral cavity.    Goal:   Pt will tolerate least restrictive diet without s/s aspiration, respiratory compromise, or overt difficulty throughout admission.  Start: 03/27/25, End: 4/11/25  Status: goal initiated        Plan:  SLP Services Indicated: Yes  Frequency: 2x week  Discussed POC with patient  SLP - OK to Discharge    Pain:   0-10  0 = No pain.     Inpatient Education:  Extensive education provided to patient regarding current swallow function, recommendations/results, and POC.       Consultations/Referrals/Coordination of Services:   N/A

## 2025-03-28 NOTE — PROGRESS NOTES
"Music Therapy Note    Cornelia Macias     Therapy Session  Referral Type: New referral this admission  Visit Type: New visit  Session Start Time: 1410  Session End Time: 1450  Intervention Delivery: In-person  Conflict of Service: None  Family Present for Session: None     Pre-assessment  Unable to Assess Reason: Outcomes not assessed  Mood/Affect: Participative, Cooperative  Verbalized Emotional State: Gratitude, Hopefulness, Empowerment         Treatment/Interventions  Areas of Focus: Emotional support, Coping  Music Therapy Interventions: Assessment, Live music listening, Improvisation, Iso-principle, Empathic listening/validating emotions  Interruption: No  Patient Fell Asleep at End of Session: No    Post-assessment  Unable to Assess Reason: Outcomes not evaluated  Mood/Affect: Participative, Appropriate, Cooperative, Calm  Verbalized Emotional State: Gratitude, Enjoyment, Hopefulness, Happiness  Continue Visiting: Yes  Total Session Time (min): 40 minutes    Narrative  Assessment Detail: Upon MTI arrival, pt lethargic, having a bright,postive affect, evidenced by stating \"my music is here! they come back for me!\". Pt expressed that they felt the music was sent to them by God. Pt shared with MT that she was very scared the day before and didn't know if she would make it. Pt open for any kind of music. Upon assessment, pt shared that she does however resonate with Adventist music and shared that the Jewish community is important to her,. PT agrreed to music therapy services at this time.  Plan: MTI will assess non musical and musical goals of the patient. Pt will provide live music listening, iso principle, and empathetic listening with a focus on emotional support and coping  Intervention: MT will engage the pt in live music listening via COM DEVr using the songs \"redemption song\" by Ivan Padron and \"Lean on me\" by Tomas conley. MTI will utilize iso principle, by adapting music selections to fit the needs of " "patient (soft dynamics, slower tempo). Patient engaged by listening with eyes closed.  During Lean on me, pt participated by singing along to lyrics of song. MTI provided empathetic listening as patient shared her personal and medical journey. MTI improvised lyrics on guitar that reflected the patients strengh and lydia. MTI told pt that they will follow up with her another time if possible.  Evaluation: Pt displayed a postive affect, via smiling, increase volume in speaking., Pt expressed gratitude, stating \"God sent me this, and I needed this so much\", displayed tears of gratitude. Pt stated that chest hurt when she was singing  Follow-up: MT Will follow up another time if applicable    Education Documentation  No documentation found.          "

## 2025-03-28 NOTE — PROCEDURES
SLP Adult FEES Evaluation  Patient Name: Cornelia Macias  MRN: [id number]  Today's Date: 3/28/2025   Time Calculation  Start Time: 0940  Stop Time: 1018  Time Calculation (min): 38 min       Recommendations:   Easy to chew with thin liquids    Strategies:   Small bites/sips  Single sips of thin liquids  Alternate bites/sips  Position upright in bed during and after for 45 min     Impression:  Diagnosis: Mild oropharyngeal dysphagia    Pt ready for a modified diet stated above. Discuss results and recommendations with RN and MD.     Prognosis:   Prognosis is deemed good      Plan:   SLP Services Indicated: Yes  Frequency: x2/wk  Discussed POC with patient  SLP - OK to Discharge      Subjective   Pt alert and cooperative for FEES    History and Physical:    Ms. Cornelia Macias is a 64 y.o. female history notable for CHF, HLD, DM2, CAD, depression, Hepatitis C tx Mavyret 2018 with SVR (hepatitis C is cured, with last RNA in 2021 undetectable), who presented at as a direct admission for a CABG evaluation.      The patient was found to have coronary artery disease back in 10/23 (mid LAD 85% + 90%; Diag#1 75%, midCirc 65%, midRCA 80%) planned for CABG then, but patient opted for medical management. Now, the patient states she has been more fatigud than normal. Denies chest pain, SOB, LH, dizziness, palpitations, syncope, n/v, and abdominal pain. Follows Dr. Amador, cardiologist at . Repeat LHC on 3/21/25: prox LAD 85%, prox 1st diag 80%, mid circ 75%, OM1 90%, mid RCA 70%. Now s/p CABGx3 (LIMA-LAD, SVG-OM, SVG-PDA), KATHERINE clip, posterior pericardial window on 3/26 with Dr. Lpoez.    FEES:   Informed consent provided by pt to perform a flexible endoscopic evaluation of swallow (FEES). Flexible fiberoptic nasoendoscope  passed through the RIGHT naris without difficulty. Adequate velopharyngeal closure obtained. Min amount of thick secretions within the piriforms and serous secretion throughout the pharyngeal lumen. Prolapse  left arytenoid evident with limited view of left TVF mobility. Right TVF appeared functional during speech and non speech tasks.     Pt given thin, mildly thick liquids, puree and solid textures using green food coloring for better visualization of the food items.     Prolong mastication of solids. Adequate manipulation of puree and all liquids. Decrease bolus containment with premature entry into the pharyngeal lumen across textures. Residue of puree/solids on base of tongue and valleculae. Repeat swallow and use of thin liquid wash clears pharyngeal lumen. Complete white out achieved indicating adequate epiglottic inversion to protect airway.  During multiple sips of thin liquids immediate coughing occurred with suspected aspiration. No penetration or aspiration viewed with single sips of thin liquids. Pt endorse fatigue at end of this study.             Inpatient Education:  Extensive education provided to patient regarding current swallow function, recommendations/results, and POC        Goals:   Pt will tolerate least restrictive diet without s/s aspiration, respiratory compromise, or overt difficulty throughout admission.  Start: 03/27/25, End: 4/11/25  Status: goal initiated  Pt will utilize safe swallow strategies independently during meals 100% of the time.              Start: 03/28/25, End: 4/11/25  Status: goal initiated    Consultations/Referrals/Coordination of Services: N/A     Treatment:   Pt instructed in all safe swallow strategies. Using talk back method pt re-states strategies and demonstrate use of these with PO trials of soft solids and thin liquids with verbal cueing.

## 2025-03-28 NOTE — PROGRESS NOTES
Physical Therapy    Physical Therapy Treatment    Patient Name: Cornelia Macias  MRN: [id number]  Department: [facility]  Room: 17/17-A  Today's Date: 3/28/2025  Time Calculation  Start Time: 1042  Stop Time: 1105  Time Calculation (min): 23 min         Assessment/Plan   PT Assessment  PT Assessment Results: Decreased strength, Decreased endurance, Impaired balance, Decreased mobility, Pain  Rehab Prognosis: Good  Barriers to Discharge Home: No anticipated barriers  Evaluation/Treatment Tolerance: Patient tolerated treatment well  Medical Staff Made Aware: Yes  End of Session Communication: Bedside nurse  Assessment Comment: Pt performed bed mobility, functional transfers, and ambulated 120' with CGA-Min A with FWW. Pt will continue to benefit from skilled PT to improve functional mobility.  End of Session Patient Position: Bed, 3 rail up, Alarm off, not on at start of session  PT Plan  Inpatient/Swing Bed or Outpatient: Inpatient  PT Plan  Treatment/Interventions: Transfer training, Bed mobility, Gait training, Stair training, Balance training, Strengthening, Endurance training, Therapeutic exercise, Therapeutic activity, Home exercise program, Positioning  PT Plan: Ongoing PT  PT Frequency: 3 times per week  PT Discharge Recommendations: Low intensity level of continued care  PT Recommended Transfer Status: Assist x1  PT - OK to Discharge: Yes      General Visit Information:   PT  Visit  PT Received On: 03/28/25  Response to Previous Treatment: Patient with no complaints from previous session.  General  Prior to Session Communication: Bedside nurse  Patient Position Received: Bed, 3 rail up, Alarm off, not on at start of session  Preferred Learning Style: auditory, verbal, visual  General Comment: Pt awake and willing to participate in PT treatment session    Subjective   Precautions:  Precautions  Medical Precautions: Cardiac precautions, Fall precautions, Chest tube  Post-Surgical Precautions: Move in the  Tube  Precautions Comment: MAP 75-95, VVI@50, SpO2>92%     Date/Time Vitals Session Patient Position Pulse Resp SpO2 BP MAP (mmHg)    03/28/25 1000 --  --  94  12  96 %  --  --     03/28/25 1042 Pre PT  Lying  93  --  97 %  149/79  105     03/28/25 1100 --  --  94  21  96 %  --  --     03/28/25 1105 Post PT  Lying  96  --  97 %  155/77  101           Vital Signs Comment: Vitals stable during session     Objective   Pain:  Pain Assessment  Pain Assessment: 0-10  0-10 (Numeric) Pain Score: 2  Pain Type: Surgical pain  Pain Location: Chest  Pain Interventions: Repositioned  Response to Interventions: Resting quietly  Cognition:  Cognition  Overall Cognitive Status: Within Functional Limits  Orientation Level: Oriented X4  Cognition Comments: Increased anxiety during session  Coordination:  Movements are Fluid and Coordinated: Yes  Postural Control:  Postural Control  Postural Control: Within Functional Limits  Extremity/Trunk Assessments:          RLE   RLE : Within Functional Limits  LLE   LLE : Within Functional Limits  Activity Tolerance:  Activity Tolerance  Endurance: Tolerates 10 - 20 min exercise with multiple rests  Early Mobility/Exercise Safety Screen: Proceed with mobilization - No exclusion criteria met  Activity Tolerance Comments: Vitals stable during session  Treatments:  Therapeutic Activity  Therapeutic Activity Performed: Yes  Therapeutic Activity 1: Increased time for advanced ICU line management required for functional mobility  Therapeutic Activity 2: Pt sat EOB for ~5 min prior to ambulation and ~2 min after ambulation with SBA    Bed Mobility  Bed Mobility: Yes  Bed Mobility 1  Bed Mobility 1: Supine to sitting, Sitting to supine  Level of Assistance 1: Minimum assistance  Bed Mobility Comments 1: Min A for LE management and trunk control to/from supine    Ambulation/Gait Training  Ambulation/Gait Training Performed: Yes  Ambulation/Gait Training 1  Surface 1: Level tile  Device 1: Rolling  walker  Assistance 1: Contact guard  Quality of Gait 1: Forward flexed posture, Decreased step length  Comments/Distance (ft) 1: x120' with FWW with CGA- 2-3 instances of Min A for FWW management  Transfers  Transfer: Yes  Transfer 1  Transfer From 1: Sit to, Stand to  Transfer to 1: Stand, Sit  Technique 1: Sit to stand, Stand to sit  Transfer Device 1: Walker  Transfer Level of Assistance 1: Contact guard  Trials/Comments 1: CGA for safety and line management; verbal cues for hand placement on FWW    Outcome Measures:  [facility] Basic Mobility  Turning from your back to your side while in a flat bed without using bedrails: A little  Moving from lying on your back to sitting on the side of a flat bed without using bedrails: A little  Moving to and from bed to chair (including a wheelchair): A little  Standing up from a chair using your arms (e.g. wheelchair or bedside chair): A little  To walk in hospital room: A little  Climbing 3-5 steps with railing: A lot  Basic Mobility - Total Score: 17    FSS-ICU  Ambulation: Walks >/ or equal to 150 feet with minimal assistance x1  Rolling: Minimal assistance (performs 75% or more of task)  Sitting: Supervision or set-up only  Transfer Sit-to-Stand: Minimal assistance (performs 75% or more of task)  Transfer Supine-to-Sit: Minimal assistance (performs 75% or more of task)  Total Score: 21      Early Mobility/Exercise Safety Screen: Proceed with mobilization - No exclusion criteria met  ICU Mobility Scale: Walking with assistance of 1 person [8]    Education Documentation  Precautions, taught by Starr Fallon PT at 3/28/2025 11:25 AM.  Learner: Patient  Readiness: Acceptance  Method: Explanation  Response: Verbalizes Understanding    Body Mechanics, taught by Starr Fallon PT at 3/28/2025 11:25 AM.  Learner: Patient  Readiness: Acceptance  Method: Explanation  Response: Verbalizes Understanding    Mobility Training, taught by Starr Fallon PT at 3/28/2025 11:25  JONO.  Learner: Patient  Readiness: Acceptance  Method: Explanation  Response: Verbalizes Understanding    Education Comments  No comments found.           Encounter Problems       Encounter Problems (Active)       Balance       Patient will demonstrate ability to score at least 24/28 on the Tinetti balance assessment tool to ensure safety upon discharge.  (Progressing)       Start:  03/27/25    Expected End:  04/10/25               Mobility       Pt will demonstrate ability to ambulate >/=800 feet with LRAD and no balance deficits to facilitate safe discharge home.  (Progressing)       Start:  03/27/25    Expected End:  04/10/25            Pt will demonstrate ability to ascend/descend 4 stairs with 1 HR and no balance deficits for safe discharge home.  (Progressing)       Start:  03/27/25    Expected End:  04/10/25               PT Transfers       Pt will demonstrate ability to perform 5X STS from chair without UE assist in < 15 seconds with stable VS for improved functional mobility.   (Progressing)       Start:  03/27/25    Expected End:  04/10/25                 JESUS COBIAN, PT

## 2025-03-28 NOTE — PROGRESS NOTES
Occupational Therapy    Evaluation and Treatment    Patient Name: Cornelia Macias  MRN: [id number]  Today's Date: 3/28/2025  Room: 17/17  Time Calculation  Start Time: 0906  Stop Time: 0932  Time Calculation (min): 26 min    Assessment  IP OT Assessment  OT Assessment: Overall decreased strength  and endurance limiting occupational performance.  Prognosis: Good  Barriers to Discharge Home: No anticipated barriers  Evaluation/Treatment Tolerance: Patient tolerated treatment well  Medical Staff Made Aware: Yes  End of Session Communication: Bedside nurse  End of Session Patient Position: Bed, 3 rail up, Alarm off, not on at start of session  Plan:  Inpatient Plan  Treatment Interventions: ADL retraining, Functional transfer training, UE strengthening/ROM, Endurance training, Neuromuscular reeducation, Compensatory technique education  OT Frequency: 2 times per week  OT Discharge Recommendations: Low intensity level of continued care  Equipment Recommended upon Discharge: Wheeled walker  OT Recommended Transfer Status:  (CGA)  OT - OK to Discharge: Yes  OT Assessment  OT Assessment Results: Decreased ADL status, Decreased endurance, Decreased functional mobility  Prognosis: Good  Evaluation/Treatment Tolerance: Patient tolerated treatment well  Medical Staff Made Aware: Yes    Subjective   Current Problem:  1. CAD, multiple vessel  Transthoracic Echo (TTE) Complete    Transthoracic Echo (TTE) Complete    Case Request Cath Lab: Left Heart Cath    Case Request Cath Lab: Left Heart Cath    Cardiac Catheterization Procedure    Cardiac Catheterization Procedure    Case Request Operating Room: CABG, 3-4 VESSELS    Case Request Operating Room: CABG, 3-4 VESSELS    Vascular US carotid artery duplex bilateral    Vascular US lower extremity vein mapping bilateral    Vascular US ankle brachial index (BRANDEN) without exercise    Vascular US carotid artery duplex bilateral    Vascular US lower extremity vein mapping bilateral     Vascular US ankle brachial index (BRANDEN) without exercise    Anesthesia Intraoperative Transesophageal Echocardiogram    Anesthesia Intraoperative Transesophageal Echocardiogram      2. Chronic systolic CHF (congestive heart failure)  Transthoracic Echo (TTE) Complete    Transthoracic Echo (TTE) Complete    Anesthesia Intraoperative Transesophageal Echocardiogram    Anesthesia Intraoperative Transesophageal Echocardiogram      3. Ischemic cardiomyopathy  Case Request Cath Lab: Left Heart Cath    Case Request Cath Lab: Left Heart Cath    Cardiac Catheterization Procedure    Cardiac Catheterization Procedure      4. Chest pain, unspecified  Cardiac Catheterization Procedure      5. Other disorders of arteries, arterioles and capillaries in diseases classified elsewhere  Vascular US carotid artery duplex bilateral    Vascular US ankle brachial index (BRANDEN) without exercise    Vascular US carotid artery duplex bilateral    Vascular US ankle brachial index (BRANDEN) without exercise      6. Shortness of breath  Vascular US lower extremity vein mapping bilateral    Vascular US lower extremity vein mapping bilateral      7. Peripheral vascular disease, unspecified (CMS-HCC)  Vascular US lower extremity vein mapping bilateral    Vascular US ankle brachial index (BRANDEN) without exercise      8. Encounter for preprocedural cardiovascular examination  Vascular US lower extremity vein mapping bilateral    Vascular US ankle brachial index (BRANDEN) without exercise      9. Other specified symptoms and signs involving the circulatory and respiratory systems  Vascular US carotid artery duplex bilateral    Vascular US lower extremity vein mapping bilateral    Vascular US ankle brachial index (BRANDEN) without exercise      10. Abnormal findings on diagnostic imaging of heart and coronary circulation  Anesthesia Intraoperative Transesophageal Echocardiogram      11. Atherosclerotic heart disease of native coronary artery with other forms of angina  pectoris  Anesthesia Intraoperative Transesophageal Echocardiogram        General:  Reason for Referral: 65 y/o female now s/p CABGx3 on 3/26  Past Medical History Relevant to Rehab: CHF, HLD, DM2, CAD, depression, Hepatitis C tx Mavyret 2018 with SVR (hepatitis C is cured, with last RNA in 2021 undetectable)  Prior to Session Communication: Bedside nurse  Patient Position Received: Bed, 3 rail up, Alarm off, not on at start of session  Family/Caregiver Present: No  General Comment: Pt is pleasant and cooperative. She puts forth good effort towards task completion.   Precautions:  Medical Precautions: Cardiac precautions, Fall precautions, Chest tube  Post-Surgical Precautions: Move in the Tube  Precautions Comment: MAP 75-95, VVI@50, SpO2>92%  Vital Signs:   Date/Time Vitals Session Patient Position Pulse Resp SpO2 BP MAP (mmHg)    03/28/25 1042 Pre PT  Lying  93  --  97 %  149/79  105     03/28/25 1100 --  --  94  21  96 %  --  --     03/28/25 1105 Post PT  Lying  96  --  97 %  155/77  101     03/28/25 1200 --  --  94  24  97 %  136/64  86         Vitals stable during session      Pain:  Pain Assessment  Pain Assessment: 0-10  0-10 (Numeric) Pain Score: 0 - No pain  Lines/Tubes/Drains:  CVC 03/26/25 Double lumen Right Internal jugular (Active)   Number of days: 2       Arterial Line 03/26/25 Left Brachial (Active)   Number of days: 2       Urethral Catheter Non-latex 14 Fr. (Active)   Number of days: 2       Chest Tube 2 Pleural (Active)   Number of days: 1       Chest Tube 1 Mediastinal (Active)   Number of days: 1         Objective   Cognition:  Overall Cognitive Status: Within Functional Limits  Orientation Level: Oriented X4           Home Living:  Type of Home: House  Lives With: Adult children  Home Layout: Two level, Able to live on main level with bedroom/bathroom  Home Access: Stairs to enter with rails  Entrance Stairs-Number of Steps: 4  Bathroom Shower/Tub: Tub/shower unit  Home Living Comments: Dtr  can assist as needed   Prior Function:  Level of [place]: Independent with ADLs and functional transfers, Independent with homemaking with ambulation  ADL Assistance: Independent  Homemaking Assistance: Independent  Ambulatory Assistance: Independent  Vocational: Unemployed  Prior Function Comments: (-) drive, (+) fall - unable to report when fall happened.  IADL History:     ADL:  Grooming Assistance: Stand by  Bathing Assistance: Minimal  UE Dressing Assistance: Stand by  LE Dressing Assistance: Minimal  Toileting Assistance with Device: Minimal  ADL Comments: Pt is receptive to education on MITT with functional task completion.  Activity Tolerance:  Endurance: Tolerates 10 - 20 min exercise with multiple rests  Early Mobility/Exercise Safety Screen: Proceed with mobilization - No exclusion criteria met  Balance:  Static Sitting Balance  Static Sitting-Level of Assistance: Distant supervision  Static Standing Balance  Static Standing-Level of Assistance: Close supervision  Bed Mobility/Transfers: Bed Mobility/Transfers: Bed Mobility  Bed Mobility: Yes  Bed Mobility 1  Bed Mobility 1: Supine to sitting, Sitting to supine  Level of Assistance 1: Minimum assistance  Bed Mobility Comments 1: Cues for hand placement and technique  Functional Mobility  Functional Mobility Performed: Yes  Functional Mobility 1  Device 1: Rolling walker  Assistance 1: Contact guard  Comments 1: Pt ambulated through room bed<>sink with CGA. Good participation.   and Transfers  Transfer: Yes  Transfer 1  Transfer From 1: Sit to  Transfer to 1: Stand  Technique 1: Sit to stand, Stand to sit  Transfer Device 1: Walker  Transfer Level of Assistance 1: Contact guard  IADL's:      Vision:     and Vision - Complex Assessment  Ocular Range of Motion: Within Functional Limits  Sensation:  Light Touch: No apparent deficits  Strength:  Strength Comments: BUE strength observed to be at least 3+/5  Perception:  Inattention/Neglect: Appears  intact  Coordination:  Movements are Fluid and Coordinated: Yes   Hand Function:  Hand Function  Gross Grasp: Functional  Coordination: Functional        Outcome Measures: [facility] Daily Activity  Putting on and taking off regular lower body clothing: A little  Bathing (including washing, rinsing, drying): A little  Putting on and taking off regular upper body clothing: None  Toileting, which includes using toilet, bedpan or urinal: A little  Taking care of personal grooming such as brushing teeth: A little  Eating Meals: None  Daily Activity - Total Score: 20        ICU Mobility Screen  Early Mobility/Exercise Safety Screen: Proceed with mobilization - No exclusion criteria met,          Education Documentation  Handouts, taught by Lillian Riley OT at 3/28/2025 12:07 PM.  Learner: Patient  Readiness: Acceptance  Method: Explanation  Response: Verbalizes Understanding    Body Mechanics, taught by Lillian Riley OT at 3/28/2025 12:07 PM.  Learner: Patient  Readiness: Acceptance  Method: Explanation  Response: Verbalizes Understanding    Precautions, taught by Lillian Riley OT at 3/28/2025 12:07 PM.  Learner: Patient  Readiness: Acceptance  Method: Explanation  Response: Verbalizes Understanding    ADL Training, taught by Lillian Riley OT at 3/28/2025 12:07 PM.  Learner: Patient  Readiness: Acceptance  Method: Explanation  Response: Verbalizes Understanding    Education Comments  No comments found.        Goals:   Encounter Problems       Encounter Problems (Active)       ADLs       Patient will complete lower body dressing with Sup  for donning and doffing all LE clothes in order to increase Indep with task participation.        Start:  03/28/25    Expected End:  04/11/25            Patient will complete toileting, including clothing management and hygiene, with Sup in order to maximize functional Indep with task completion.        Start:  03/28/25    Expected End:  04/11/25               COGNITION/SAFETY        Pt will identify and adhere to post-op MITT precautions during ADL and functional activity tasks with no more than min verbal cues in order to increase safety and independence upon discharge.        Start:  03/28/25    Expected End:  04/11/25               EXERCISE/STRENGTHENING       Pt will increase endurance to tolerate 15-20min of activity with no more than 1 rest break in order to increase ability to engage in ADL completion.        Start:  03/28/25    Expected End:  04/11/25               MOBILITY       Pt will demo increased functional mobility to tolerate tasks necessary to complete ADL routine with Sup.       Start:  03/28/25    Expected End:  04/11/25                   Treatment Completed on Evaluation         Activities of Daily Living:    Grooming  Grooming Level of Assistance: Setup, Close supervision  Grooming Where Assessed: Standing sinkside  Grooming Comments: Oral hygiene                            Therapy/Activity:     Therapeutic Activity  Therapeutic Activity Performed: Yes  Therapeutic Activity 1: Increased time for skilled ICU line management required for functional mobility.  Therapeutic Activity 2: Static stand at sink for approx 4min with Sup. FWW for steadying support as needed.             03/28/25 at 12:08 PM   Lillian Riley OT   Rehab Office: [phone]

## 2025-03-29 ENCOUNTER — APPOINTMENT (OUTPATIENT)
Dept: RADIOLOGY | Facility: HOSPITAL | Age: 65
DRG: 234 | End: 2025-03-29
Payer: COMMERCIAL

## 2025-03-29 LAB
ALBUMIN SERPL BCP-MCNC: 3.2 G/DL (ref 3.4–5)
ANION GAP SERPL CALC-SCNC: 12 MMOL/L (ref 10–20)
BUN SERPL-MCNC: 37 MG/DL (ref 6–23)
CALCIUM SERPL-MCNC: 8.8 MG/DL (ref 8.6–10.6)
CHLORIDE SERPL-SCNC: 105 MMOL/L (ref 98–107)
CO2 SERPL-SCNC: 24 MMOL/L (ref 21–32)
CREAT SERPL-MCNC: 2.09 MG/DL (ref 0.5–1.05)
EGFRCR SERPLBLD CKD-EPI 2021: 26 ML/MIN/1.73M*2
ERYTHROCYTE [DISTWIDTH] IN BLOOD BY AUTOMATED COUNT: 15 % (ref 11.5–14.5)
GLUCOSE BLD MANUAL STRIP-MCNC: 100 MG/DL (ref 74–99)
GLUCOSE BLD MANUAL STRIP-MCNC: 135 MG/DL (ref 74–99)
GLUCOSE BLD MANUAL STRIP-MCNC: 153 MG/DL (ref 74–99)
GLUCOSE BLD MANUAL STRIP-MCNC: 185 MG/DL (ref 74–99)
GLUCOSE SERPL-MCNC: 138 MG/DL (ref 74–99)
HCT VFR BLD AUTO: 26.6 % (ref 36–46)
HGB BLD-MCNC: 8.4 G/DL (ref 12–16)
MAGNESIUM SERPL-MCNC: 2.63 MG/DL (ref 1.6–2.4)
MCH RBC QN AUTO: 30.3 PG (ref 26–34)
MCHC RBC AUTO-ENTMCNC: 31.6 G/DL (ref 32–36)
MCV RBC AUTO: 96 FL (ref 80–100)
NRBC BLD-RTO: 0 /100 WBCS (ref 0–0)
PHOSPHATE SERPL-MCNC: 3.1 MG/DL (ref 2.5–4.9)
PLATELET # BLD AUTO: 104 X10*3/UL (ref 150–450)
POTASSIUM SERPL-SCNC: 4 MMOL/L (ref 3.5–5.3)
RBC # BLD AUTO: 2.77 X10*6/UL (ref 4–5.2)
SODIUM SERPL-SCNC: 137 MMOL/L (ref 136–145)
WBC # BLD AUTO: 10 X10*3/UL (ref 4.4–11.3)

## 2025-03-29 PROCEDURE — 80069 RENAL FUNCTION PANEL: CPT | Performed by: NURSE PRACTITIONER

## 2025-03-29 PROCEDURE — 94668 MNPJ CHEST WALL SBSQ: CPT

## 2025-03-29 PROCEDURE — 71045 X-RAY EXAM CHEST 1 VIEW: CPT

## 2025-03-29 PROCEDURE — 2500000001 HC RX 250 WO HCPCS SELF ADMINISTERED DRUGS (ALT 637 FOR MEDICARE OP): Performed by: NURSE PRACTITIONER

## 2025-03-29 PROCEDURE — 2500000001 HC RX 250 WO HCPCS SELF ADMINISTERED DRUGS (ALT 637 FOR MEDICARE OP)

## 2025-03-29 PROCEDURE — 94640 AIRWAY INHALATION TREATMENT: CPT

## 2025-03-29 PROCEDURE — 99232 SBSQ HOSP IP/OBS MODERATE 35: CPT

## 2025-03-29 PROCEDURE — 2500000005 HC RX 250 GENERAL PHARMACY W/O HCPCS: Performed by: NURSE PRACTITIONER

## 2025-03-29 PROCEDURE — 1200000002 HC GENERAL ROOM WITH TELEMETRY DAILY

## 2025-03-29 PROCEDURE — 2500000002 HC RX 250 W HCPCS SELF ADMINISTERED DRUGS (ALT 637 FOR MEDICARE OP, ALT 636 FOR OP/ED): Performed by: NURSE PRACTITIONER

## 2025-03-29 PROCEDURE — 2500000004 HC RX 250 GENERAL PHARMACY W/ HCPCS (ALT 636 FOR OP/ED): Performed by: NURSE PRACTITIONER

## 2025-03-29 PROCEDURE — 71045 X-RAY EXAM CHEST 1 VIEW: CPT | Performed by: RADIOLOGY

## 2025-03-29 PROCEDURE — 82947 ASSAY GLUCOSE BLOOD QUANT: CPT

## 2025-03-29 PROCEDURE — 83735 ASSAY OF MAGNESIUM: CPT | Performed by: NURSE PRACTITIONER

## 2025-03-29 PROCEDURE — 85027 COMPLETE CBC AUTOMATED: CPT | Performed by: NURSE PRACTITIONER

## 2025-03-29 PROCEDURE — 36415 COLL VENOUS BLD VENIPUNCTURE: CPT | Performed by: NURSE PRACTITIONER

## 2025-03-29 PROCEDURE — 94669 MECHANICAL CHEST WALL OSCILL: CPT

## 2025-03-29 RX ORDER — CARVEDILOL 12.5 MG/1
12.5 TABLET ORAL 2 TIMES DAILY
Status: DISCONTINUED | OUTPATIENT
Start: 2025-03-29 | End: 2025-04-01 | Stop reason: HOSPADM

## 2025-03-29 RX ADMIN — HEPARIN SODIUM 5000 UNITS: 5000 INJECTION, SOLUTION INTRAVENOUS; SUBCUTANEOUS at 08:47

## 2025-03-29 RX ADMIN — Medication 21 L/MIN: at 08:48

## 2025-03-29 RX ADMIN — ASCORBIC ACID, THIAMINE MONONITRATE,RIBOFLAVIN, NIACINAMIDE, PYRIDOXINE HYDROCHLORIDE, FOLIC ACID, CYANOCOBALAMIN, BIOTIN, CALCIUM PANTOTHENATE, 1 CAPSULE: 100; 1.5; 1.7; 20; 10; 1; 6000; 150000; 5 CAPSULE, LIQUID FILLED ORAL at 08:46

## 2025-03-29 RX ADMIN — ASPIRIN 81 MG: 81 TABLET, COATED ORAL at 08:46

## 2025-03-29 RX ADMIN — CHOLECALCIFEROL TAB 25 MCG (1000 UNIT) 1000 UNITS: 25 TAB at 08:46

## 2025-03-29 RX ADMIN — ACETAMINOPHEN 650 MG: 325 TABLET ORAL at 05:24

## 2025-03-29 RX ADMIN — ACETAMINOPHEN 650 MG: 325 TABLET ORAL at 13:57

## 2025-03-29 RX ADMIN — SENNOSIDES AND DOCUSATE SODIUM 2 TABLET: 50; 8.6 TABLET ORAL at 08:47

## 2025-03-29 RX ADMIN — SENNOSIDES AND DOCUSATE SODIUM 2 TABLET: 50; 8.6 TABLET ORAL at 20:46

## 2025-03-29 RX ADMIN — INSULIN LISPRO 2 UNITS: 100 INJECTION, SOLUTION INTRAVENOUS; SUBCUTANEOUS at 12:27

## 2025-03-29 RX ADMIN — POLYSACCHARIDE-IRON COMPLEX 150 MG: 150 CAPSULE ORAL at 08:47

## 2025-03-29 RX ADMIN — ACETAMINOPHEN 650 MG: 325 TABLET ORAL at 20:46

## 2025-03-29 RX ADMIN — ATORVASTATIN CALCIUM 80 MG: 80 TABLET, FILM COATED ORAL at 20:46

## 2025-03-29 RX ADMIN — HEPARIN SODIUM 5000 UNITS: 5000 INJECTION, SOLUTION INTRAVENOUS; SUBCUTANEOUS at 16:32

## 2025-03-29 RX ADMIN — HYDRALAZINE HYDROCHLORIDE 10 MG: 20 INJECTION INTRAMUSCULAR; INTRAVENOUS at 21:03

## 2025-03-29 RX ADMIN — CARVEDILOL 12.5 MG: 12.5 TABLET, FILM COATED ORAL at 20:46

## 2025-03-29 RX ADMIN — POLYETHYLENE GLYCOL 3350 17 G: 17 POWDER, FOR SOLUTION ORAL at 08:47

## 2025-03-29 RX ADMIN — CARVEDILOL 12.5 MG: 12.5 TABLET, FILM COATED ORAL at 08:46

## 2025-03-29 RX ADMIN — HEPARIN SODIUM 5000 UNITS: 5000 INJECTION, SOLUTION INTRAVENOUS; SUBCUTANEOUS at 01:41

## 2025-03-29 ASSESSMENT — PAIN SCALES - GENERAL
PAINLEVEL_OUTOF10: 3
PAINLEVEL_OUTOF10: 0 - NO PAIN
PAINLEVEL_OUTOF10: 4

## 2025-03-29 ASSESSMENT — PAIN DESCRIPTION - LOCATION
LOCATION: CHEST
LOCATION: CHEST

## 2025-03-29 ASSESSMENT — PAIN - FUNCTIONAL ASSESSMENT
PAIN_FUNCTIONAL_ASSESSMENT: 0-10

## 2025-03-29 ASSESSMENT — COGNITIVE AND FUNCTIONAL STATUS - GENERAL
CLIMB 3 TO 5 STEPS WITH RAILING: A LITTLE
MOBILITY SCORE: 22
WALKING IN HOSPITAL ROOM: A LITTLE
DAILY ACTIVITIY SCORE: 24

## 2025-03-29 NOTE — SIGNIFICANT EVENT
1 right pleural and 1 left pleural chest tube removed without difficulty. Patient tolerated well.    Stat 1V CXR ordered.    Malinda Hill PA-C  Cardiac Surgery INGE  [facility]  Team Pager [phone]

## 2025-03-29 NOTE — CARE PLAN
Problem: Safety - Adult  Goal: Free from fall injury  Outcome: Progressing     Problem: Discharge Planning  Goal: Discharge to home or other facility with appropriate resources  Outcome: Progressing     Problem: Chronic Conditions and Co-morbidities  Goal: Patient's chronic conditions and co-morbidity symptoms are monitored and maintained or improved  Outcome: Progressing     Problem: Nutrition  Goal: Nutrient intake appropriate for maintaining nutritional needs  Outcome: Progressing     Problem: Diabetes  Goal: Achieve decreasing blood glucose levels by end of shift  Outcome: Progressing  Goal: Increase stability of blood glucose readings by end of shift  Outcome: Progressing  Goal: Decrease in ketones present in urine by end of shift  Outcome: Progressing  Goal: Maintain electrolyte levels within acceptable range throughout shift  Outcome: Progressing  Goal: Maintain glucose levels >70mg/dl to <250mg/dl throughout shift  Outcome: Progressing  Goal: No changes in neurological exam by end of shift  Outcome: Progressing  Goal: Learn about and adhere to nutrition recommendations by end of shift  Outcome: Progressing  Goal: Vital signs within normal range for age by end of shift  Outcome: Progressing  Goal: Increase self care and/or family involovement by end of shift  Outcome: Progressing  Goal: Receive DSME education by end of shift  Outcome: Progressing     Problem: Pain  Goal: Takes deep breaths with improved pain control throughout the shift  Outcome: Progressing  Goal: Turns in bed with improved pain control throughout the shift  Outcome: Progressing  Goal: Walks with improved pain control throughout the shift  Outcome: Progressing  Goal: Performs ADL's with improved pain control throughout shift  Outcome: Progressing  Goal: Participates in PT with improved pain control throughout the shift  Outcome: Progressing  Goal: Free from opioid side effects throughout the shift  Outcome: Progressing  Goal: Free from  acute confusion related to pain meds throughout the shift  Outcome: Progressing     Problem: Safety - Medical Restraint  Goal: Remains free of injury from restraints (Restraint for Interference with Medical Device)  Outcome: Progressing  Goal: Free from restraint(s) (Restraint for Interference with Medical Device)  Outcome: Progressing     Problem: Meds/Post-op Pain  Goal: Pain controlled to tolerate pain level  Outcome: Progressing  Goal: Tolerates prescribed medication  Outcome: Progressing     Problem: DVT/VTE Prevention/Activity  Goal: No decrease in circulation/sensation  Outcome: Progressing  Goal: Prevent skin breakdown  Outcome: Progressing  Goal: Return to preop oxygenation status  Outcome: Progressing  Goal: Tolerates optimal activity  Outcome: Progressing  Goal: Increase self care and/or family involvement in 24 hrs.  Outcome: Progressing     Problem: Wound care/infection prevention  Goal: No signs of infection in 24 hrs.  Outcome: Progressing  Goal: No unexpected bleeding from incision this shift  Outcome: Progressing     Problem: Diet/fluid balance  Goal: Adequate urinary output  Outcome: Progressing  Goal: Free from nausea/vomiting  Outcome: Progressing  Goal: Return in bowel function  Outcome: Progressing  Goal: Tolerates prescribed diet  Outcome: Progressing     Problem: Other goals  Goal: No change in neurological status  Outcome: Progressing  Goal: Stabilize vital signs (return to 10% of baseline)  Outcome: Progressing     Problem: Skin  Goal: Decreased wound size/increased tissue granulation at next dressing change  Outcome: Progressing  Goal: Participates in plan/prevention/treatment measures  Outcome: Progressing  Goal: Prevent/manage excess moisture  Outcome: Progressing  Flowsheets (Taken 3/29/2025 1125)  Prevent/manage excess moisture: Moisturize dry skin  Goal: Prevent/minimize sheer/friction injuries  Outcome: Progressing  Goal: Promote/optimize nutrition  Outcome: Progressing  Goal:  Promote skin healing  Outcome: Progressing     Problem: Heart Failure  Goal: Improved gas exchange this shift  Outcome: Progressing  Goal: Improved urinary output this shift  Outcome: Progressing  Goal: Reduction in peripheral edema within 24 hours  Outcome: Progressing  Goal: Report improvement of dyspnea/breathlessness this shift  Outcome: Progressing  Goal: Weight from fluid excess reduced over 2-3 days, then stabilize  Outcome: Progressing  Goal: Increase self care and/or family involvement in 24 hours  Outcome: Progressing

## 2025-03-29 NOTE — PROGRESS NOTES
"CARDIAC SURGERY DAILY PROGRESS NOTE    Ms. Cornelia Macias is a 64 y.o. female history notable for CHF, HLD, DM2, CAD, depression, Hepatitis C tx Mavyret 2018 with SVR (hepatitis C is cured, with last RNA in 2021 undetectable), now s/p CABGx3 with Dr. Lopez. Now s/p CABGx3 (LIMA-LAD, SVG-OM, SVG-PDA), KATHERINE clip, posterior pericardial window on 3/26 with Dr. Lopez.    03/26/2025 OPERATION/PROCEDURE: with Dr. Lopez   1. Coronary artery bypass grafting x3              In situ left internal mammary artery to left anterior descending coronary artery bypass               Greater saphenous vein to posterior descending coronary artery bypass               Greater saphenous vein to obtuse marginal coronary artery bypass   2. Endoscopic harvest of right greater saphenous vein   3. Medistim flow probe analysis of bypass grafts   4. Left atrial appendage clip with 40mm AtriClip  5. Posterior pericardial window    CTICU course: HTN with MAPs 130s  Transferred to T3: 3/28  =======================================  Interval History:   No acute events overnight    SUBJECTIVE:  Complaints of chest pain when coughing.     Objective   /76 (BP Location: Left arm, Patient Position: Lying)   Pulse 94   Temp 36.9 °C (98.4 °F) (Temporal)   Resp 18   Ht 1.575 m (5' 2\")   Wt 58.5 kg (128 lb 15.5 oz)   SpO2 92%   BMI 23.59 kg/m²   0-10 (Numeric) Pain Score: 4   3 Day Weight Change: Unable to Calculate    Intake and Output    Intake/Output Summary (Last 24 hours) at 3/29/2025 0751  Last data filed at 3/29/2025 0523  Gross per 24 hour   Intake 90 ml   Output 1235 ml   Net -1145 ml       Physical Exam  Physical Exam  Constitutional:       General: She is not in acute distress.     Appearance: Normal appearance. She is not ill-appearing or toxic-appearing.      Comments: Elderly female sitting up in chair    HENT:      Head: Normocephalic and atraumatic.   Eyes:      Extraocular Movements: Extraocular movements intact.      " Conjunctiva/sclera: Conjunctivae normal.   Cardiovascular:      Rate and Rhythm: Normal rate and regular rhythm.      Pulses: Normal pulses.      Comments: Tele - NSR rate 90s   A/V epicardial wires set VVI @ 50   Pulmonary:      Effort: Pulmonary effort is normal. No respiratory distress.      Comments: On RA with appropriate oxygenation   Equal rise/fall of chest   LPL chest tube with serous output and no air leak noted. On -20cm suction.   Abdominal:      General: Abdomen is flat. There is no distension.      Palpations: Abdomen is soft.      Tenderness: There is no abdominal tenderness.      Comments: Last BM 3/29    Genitourinary:     Comments: Voiding independently   Musculoskeletal:         General: No swelling. Normal range of motion.      Right lower leg: No edema.      Left lower leg: No edema.   Skin:     General: Skin is warm and dry.      Capillary Refill: Capillary refill takes less than 2 seconds.      Comments: All wounds clean and dressed appropriately    Neurological:      General: No focal deficit present.      Mental Status: She is alert and oriented to person, place, and time.   Psychiatric:         Mood and Affect: Mood normal.         Behavior: Behavior normal.       Medications  Scheduled medications  acetaminophen, 650 mg, oral, q8h  aspirin, 81 mg, oral, Daily  atorvastatin, 80 mg, oral, Nightly  carvedilol, 6.25 mg, oral, BID  cholecalciferol, 1,000 Units, oral, Daily  heparin (porcine), 5,000 Units, subcutaneous, q8h  insulin lispro, 0-10 Units, subcutaneous, TID AC  iron polysaccharides, 150 mg, oral, Daily  oxygen, , inhalation, Continuous - 02/gases  polyethylene glycol, 17 g, oral, BID  sennosides-docusate sodium, 2 tablet, oral, BID  vitamin B complex-vitamin C-folic acid, 1 capsule, oral, Daily    Continuous medications   PRN medications  PRN medications: bisacodyl, hydrALAZINE, labetaloL, naloxone, ondansetron **OR** ondansetron, oxyCODONE, oxyCODONE    Labs  Results for orders  placed or performed during the hospital encounter of 25 (from the past 24 hours)   POCT GLUCOSE   Result Value Ref Range    POCT Glucose 98 74 - 99 mg/dL   POCT GLUCOSE   Result Value Ref Range    POCT Glucose 148 (H) 74 - 99 mg/dL   POCT GLUCOSE   Result Value Ref Range    POCT Glucose 166 (H) 74 - 99 mg/dL     =========================  IMAGING/ DIAGNOSTIC TESTING:  I have personally reviewed the following test result(s):    ========================  XR CHEST 1 VIEW  3/28/2025 3:57 am   IMPRESSION:  1.  Interval increase in left retrocardiac opacity which may  represent atelectasis though interval aspiration or developing  infection are not excluded.  2. Stable trace left pleural effusion. No pneumothorax.  3. Medical devices as above.    ================  IMPRESSION & PLAN:  ===============  POD #3 s/p CABGx3 (LIMA-LAD, SVG-PDA, and SVG-OM)  - Increase activity/ ambulation; PT/OT  - Encourage IS, C/DB; respiratory therapy; wean O2 as lore   - Cardiac rehab referral   - Continue cardiac meds: ASA, BB, statin   - continue start ACE-I as tolerated   - Pain and anticonstipation meds  -  1 right pleural and 1 left pleural in place to -20cm suction;   - Maintain chest tubes until meet criteria to remove; monitor daily 1v PCXR while chest tubes in place   - 2v CXR ordered for 3/30/2025   - Postop echo not necessary for isolated CABG   - Cut epicardial wires prior to discharge   - Tele until discharge  - Optimize nutrition and electrolytes    Rhythm  - Tele: NSR rate 90s   - Increase carvedilol to 12.5mg BID   - Adjust medications as tolerated    Hypertension: home meds: carvedilol 12.5mg BID, imdur 30mg daily, valsartan 80mg daily   Systolic (24hrs), Av , Min:112 , Max:170   - Increase carvedilol to 12.5mg BID   - additional antihypertensives as needed    HLD: home meds: atorvastatin 20mg daily  - Continue atorvastatin 80mg daily     DM: home meds: jardiace 10mg daily   Lab Results   Component Value Date     HGBA1C 6.5 (H) 2025     Results from last 7 days   Lab Units 25  0807 25  2115 25  1635 25  1123 25  0737 25  2149 25  1615   POCT GLUCOSE mg/dL 135* 166* 148* 98 148* 100* 147*   -accuchecks premeal and at bedtime  -diabetic diet  -lispro premeal corrective scale     CKD: baseline SCr 1.8-2.5.  Creatinine   Date Value Ref Range Status   2025 2.09 (H) 0.50 - 1.05 mg/dL Final   2025 2.10 (H) 0.50 - 1.05 mg/dL Final   2025 2.07 (H) 0.50 - 1.05 mg/dL Final   2025 2.14 (H) 0.50 - 1.05 mg/dL Final   2025 2.10 (H) 0.50 - 1.05 mg/dL Final   -daily RFP  -diuretics as indicated  -renally dose all medications  -avoid nephrotoxic drugs  -accurate I/Os     Acute Blood Loss Anemia   Recent Labs     25  0046 25  1530 25  0258 25  1538 25  0607 25  0720 25  0747   HGB 8.9* 9.3* 9.2* 10.7* 9.8* 9.7* 10.6*   HCT 28.1* 26.9* 28.4* 32.6* 31.7* 30.3* 34.1*   - MV, PO Iron x1mo  - Daily labs, transfuse as indicated    Thrombocytopenia  Recent Labs     25  0046 25  1530 25  0258 25  1538 25  0607 25  0720 25  0747   PLT 90* 95* 92* 98* 165 175 187   - Etiology likely postop/CPB related  - Continue to trend with daily CBCs    Volume/Electrolyte Status: Preop wt Weight: 59 kg (130 lb 1.1 oz)   Vitals:    25 0512   Weight: 58.5 kg (128 lb 15.5 oz)   - Weights: 58.5kg (3/26)  - Adjust diuresis as needed for postop cardiac surgery hypervolemia  - Replete electrolytes for hypokalemia/hypomagnesemia/hypophosphatemia as needed   - Daily weights and strict I&Os  - Daily RFP while admitted    Leukocytosis  Recent Labs     25  0046 25  1530 25  0258 25  1538 25  0607 25  0720 25  0747   WBC 10.2 11.1 8.2 10.6 5.4 5.0 4.7     Temp (36hrs), Av.7 °C (98.1 °F), Min:36.3 °C (97.3 °F), Max:37 °C (98.6 °F)   - aggressive pulmonary hygiene  -  monitor for s/s infection  - likely atelectasis/ postoperative in etiology  - daily CBC to follow    VTE Prophylaxis: SCDs/TEDs, ambulation, SQ heparin  Code Status: Full Code    Dispo  - PT/OT recs low intensity   - Would benefit from homecare RN for cardiac surgery carepath  - Anticipate discharge 2-3 days, pending volume optimization, post-surgical imaging, and improvement in mobility   - Will continue to assess discharge needs      Malinda Hill PA-C  Cardiac Surgery INGE  [facility]  Team Phone [phone]    3/29/2025  7:51 AM

## 2025-03-30 ENCOUNTER — APPOINTMENT (OUTPATIENT)
Dept: RADIOLOGY | Facility: HOSPITAL | Age: 65
DRG: 234 | End: 2025-03-30
Payer: COMMERCIAL

## 2025-03-30 VITALS
RESPIRATION RATE: 18 BRPM | HEART RATE: 100 BPM | WEIGHT: 132.4 LBS | HEIGHT: 62 IN | TEMPERATURE: 98.6 F | BODY MASS INDEX: 24.37 KG/M2 | SYSTOLIC BLOOD PRESSURE: 156 MMHG | OXYGEN SATURATION: 97 % | DIASTOLIC BLOOD PRESSURE: 79 MMHG

## 2025-03-30 LAB
ALBUMIN SERPL BCP-MCNC: 3.3 G/DL (ref 3.4–5)
ANION GAP SERPL CALC-SCNC: 13 MMOL/L (ref 10–20)
BUN SERPL-MCNC: 38 MG/DL (ref 6–23)
CALCIUM SERPL-MCNC: 9 MG/DL (ref 8.6–10.6)
CHLORIDE SERPL-SCNC: 106 MMOL/L (ref 98–107)
CO2 SERPL-SCNC: 23 MMOL/L (ref 21–32)
CREAT SERPL-MCNC: 2.06 MG/DL (ref 0.5–1.05)
EGFRCR SERPLBLD CKD-EPI 2021: 26 ML/MIN/1.73M*2
ERYTHROCYTE [DISTWIDTH] IN BLOOD BY AUTOMATED COUNT: 14.7 % (ref 11.5–14.5)
GLUCOSE BLD MANUAL STRIP-MCNC: 115 MG/DL (ref 74–99)
GLUCOSE BLD MANUAL STRIP-MCNC: 115 MG/DL (ref 74–99)
GLUCOSE BLD MANUAL STRIP-MCNC: 143 MG/DL (ref 74–99)
GLUCOSE BLD MANUAL STRIP-MCNC: 153 MG/DL (ref 74–99)
GLUCOSE SERPL-MCNC: 149 MG/DL (ref 74–99)
HCT VFR BLD AUTO: 27.3 % (ref 36–46)
HGB BLD-MCNC: 8.7 G/DL (ref 12–16)
MAGNESIUM SERPL-MCNC: 2.56 MG/DL (ref 1.6–2.4)
MCH RBC QN AUTO: 29.8 PG (ref 26–34)
MCHC RBC AUTO-ENTMCNC: 31.9 G/DL (ref 32–36)
MCV RBC AUTO: 94 FL (ref 80–100)
NRBC BLD-RTO: 0 /100 WBCS (ref 0–0)
PHOSPHATE SERPL-MCNC: 2.1 MG/DL (ref 2.5–4.9)
PLATELET # BLD AUTO: 138 X10*3/UL (ref 150–450)
POTASSIUM SERPL-SCNC: 3.9 MMOL/L (ref 3.5–5.3)
RBC # BLD AUTO: 2.92 X10*6/UL (ref 4–5.2)
SODIUM SERPL-SCNC: 138 MMOL/L (ref 136–145)
WBC # BLD AUTO: 8.4 X10*3/UL (ref 4.4–11.3)

## 2025-03-30 PROCEDURE — 71046 X-RAY EXAM CHEST 2 VIEWS: CPT

## 2025-03-30 PROCEDURE — 2500000004 HC RX 250 GENERAL PHARMACY W/ HCPCS (ALT 636 FOR OP/ED): Performed by: NURSE PRACTITIONER

## 2025-03-30 PROCEDURE — 85027 COMPLETE CBC AUTOMATED: CPT | Performed by: NURSE PRACTITIONER

## 2025-03-30 PROCEDURE — 71046 X-RAY EXAM CHEST 2 VIEWS: CPT | Performed by: RADIOLOGY

## 2025-03-30 PROCEDURE — 83735 ASSAY OF MAGNESIUM: CPT | Performed by: NURSE PRACTITIONER

## 2025-03-30 PROCEDURE — 82947 ASSAY GLUCOSE BLOOD QUANT: CPT

## 2025-03-30 PROCEDURE — 94640 AIRWAY INHALATION TREATMENT: CPT

## 2025-03-30 PROCEDURE — 1200000002 HC GENERAL ROOM WITH TELEMETRY DAILY

## 2025-03-30 PROCEDURE — 80069 RENAL FUNCTION PANEL: CPT | Performed by: NURSE PRACTITIONER

## 2025-03-30 PROCEDURE — 2500000005 HC RX 250 GENERAL PHARMACY W/O HCPCS: Performed by: NURSE PRACTITIONER

## 2025-03-30 PROCEDURE — 94667 MNPJ CHEST WALL 1ST: CPT

## 2025-03-30 PROCEDURE — 2500000002 HC RX 250 W HCPCS SELF ADMINISTERED DRUGS (ALT 637 FOR MEDICARE OP, ALT 636 FOR OP/ED): Performed by: NURSE PRACTITIONER

## 2025-03-30 PROCEDURE — 99232 SBSQ HOSP IP/OBS MODERATE 35: CPT | Performed by: NURSE PRACTITIONER

## 2025-03-30 PROCEDURE — 36415 COLL VENOUS BLD VENIPUNCTURE: CPT | Performed by: NURSE PRACTITIONER

## 2025-03-30 PROCEDURE — 2500000001 HC RX 250 WO HCPCS SELF ADMINISTERED DRUGS (ALT 637 FOR MEDICARE OP): Performed by: NURSE PRACTITIONER

## 2025-03-30 PROCEDURE — 2500000001 HC RX 250 WO HCPCS SELF ADMINISTERED DRUGS (ALT 637 FOR MEDICARE OP)

## 2025-03-30 RX ORDER — VALSARTAN 80 MG/1
80 TABLET ORAL DAILY
Status: DISCONTINUED | OUTPATIENT
Start: 2025-03-30 | End: 2025-04-01 | Stop reason: HOSPADM

## 2025-03-30 RX ADMIN — HEPARIN SODIUM 5000 UNITS: 5000 INJECTION, SOLUTION INTRAVENOUS; SUBCUTANEOUS at 00:55

## 2025-03-30 RX ADMIN — HYDRALAZINE HYDROCHLORIDE 10 MG: 20 INJECTION INTRAMUSCULAR; INTRAVENOUS at 05:06

## 2025-03-30 RX ADMIN — ASPIRIN 81 MG: 81 TABLET, COATED ORAL at 08:14

## 2025-03-30 RX ADMIN — ASCORBIC ACID, THIAMINE MONONITRATE,RIBOFLAVIN, NIACINAMIDE, PYRIDOXINE HYDROCHLORIDE, FOLIC ACID, CYANOCOBALAMIN, BIOTIN, CALCIUM PANTOTHENATE, 1 CAPSULE: 100; 1.5; 1.7; 20; 10; 1; 6000; 150000; 5 CAPSULE, LIQUID FILLED ORAL at 08:14

## 2025-03-30 RX ADMIN — HEPARIN SODIUM 5000 UNITS: 5000 INJECTION, SOLUTION INTRAVENOUS; SUBCUTANEOUS at 08:52

## 2025-03-30 RX ADMIN — ACETAMINOPHEN 650 MG: 325 TABLET ORAL at 05:05

## 2025-03-30 RX ADMIN — ACETAMINOPHEN 650 MG: 325 TABLET ORAL at 12:48

## 2025-03-30 RX ADMIN — POLYSACCHARIDE-IRON COMPLEX 150 MG: 150 CAPSULE ORAL at 08:14

## 2025-03-30 RX ADMIN — CARVEDILOL 12.5 MG: 12.5 TABLET, FILM COATED ORAL at 08:14

## 2025-03-30 RX ADMIN — Medication 1 L/MIN: at 08:15

## 2025-03-30 RX ADMIN — CHOLECALCIFEROL TAB 25 MCG (1000 UNIT) 1000 UNITS: 25 TAB at 08:14

## 2025-03-30 RX ADMIN — INSULIN LISPRO 2 UNITS: 100 INJECTION, SOLUTION INTRAVENOUS; SUBCUTANEOUS at 11:10

## 2025-03-30 RX ADMIN — VALSARTAN 80 MG: 80 TABLET, FILM COATED ORAL at 11:10

## 2025-03-30 RX ADMIN — CARVEDILOL 12.5 MG: 12.5 TABLET, FILM COATED ORAL at 21:18

## 2025-03-30 RX ADMIN — HEPARIN SODIUM 5000 UNITS: 5000 INJECTION, SOLUTION INTRAVENOUS; SUBCUTANEOUS at 17:43

## 2025-03-30 RX ADMIN — ACETAMINOPHEN 650 MG: 325 TABLET ORAL at 21:18

## 2025-03-30 RX ADMIN — ATORVASTATIN CALCIUM 80 MG: 80 TABLET, FILM COATED ORAL at 21:18

## 2025-03-30 ASSESSMENT — COGNITIVE AND FUNCTIONAL STATUS - GENERAL
DAILY ACTIVITIY SCORE: 24
CLIMB 3 TO 5 STEPS WITH RAILING: A LITTLE
MOBILITY SCORE: 23

## 2025-03-30 ASSESSMENT — PAIN - FUNCTIONAL ASSESSMENT
PAIN_FUNCTIONAL_ASSESSMENT: 0-10

## 2025-03-30 ASSESSMENT — PAIN SCALES - GENERAL
PAINLEVEL_OUTOF10: 3
PAINLEVEL_OUTOF10: 8
PAINLEVEL_OUTOF10: 2
PAINLEVEL_OUTOF10: 3
PAINLEVEL_OUTOF10: 0 - NO PAIN

## 2025-03-30 ASSESSMENT — PAIN DESCRIPTION - LOCATION
LOCATION: CHEST
LOCATION: CHEST

## 2025-03-30 NOTE — PROGRESS NOTES
"CARDIAC SURGERY DAILY PROGRESS NOTE    Ms. Cornelia Macias is a 64 y.o. female history notable for CHF, HLD, DM2, CAD, depression, Hepatitis C tx M[place] 2018 with SVR (hepatitis C is cured, with last RNA in 2021 undetectable), now s/p CABGx3 with Dr. Lopez. Now s/p CABGx3 (LIMA-LAD, SVG-OM, SVG-PDA), KATHERINE clip, posterior pericardial window on 3/26 with Dr. Lopez.    03/26/2025 OPERATION/PROCEDURE: with Dr. Lopez   1. Coronary artery bypass grafting x3              In situ left internal mammary artery to left anterior descending coronary artery bypass               Greater saphenous vein to posterior descending coronary artery bypass               Greater saphenous vein to obtuse marginal coronary artery bypass   2. Endoscopic harvest of right greater saphenous vein   3. Medistim flow probe analysis of bypass grafts   4. Left atrial appendage clip with 40mm AtriClip  5. Posterior pericardial window    CTICU course: HTN with MAPs 130s  Transferred to T3: 3/28  =======================================  Interval History:   No acute events overnight    SUBJECTIVE:      Objective   /69 (BP Location: Left arm, Patient Position: Lying)   Pulse 88   Temp 36.9 °C (98.4 °F) (Temporal)   Resp 20   Ht 1.575 m (5' 2\")   Wt 60.1 kg (132 lb 6.4 oz)   SpO2 98%   BMI 24.22 kg/m²   0-10 (Numeric) Pain Score: 0 - No pain   3 Day Weight Change: Unable to Calculate    Intake and Output    Intake/Output Summary (Last 24 hours) at 3/30/2025 1241  Last data filed at 3/30/2025 0900  Gross per 24 hour   Intake 60 ml   Output 900 ml   Net -840 ml       Physical Exam  Physical Exam  Constitutional:       General: She is not in acute distress.     Comments: Elderly female sitting up in chair    HENT:      Head: Normocephalic and atraumatic.      Mouth/Throat:      Mouth: Mucous membranes are moist.   Eyes:      Conjunctiva/sclera: Conjunctivae normal.   Cardiovascular:      Rate and Rhythm: Normal rate and regular rhythm.      Pulses: " Normal pulses.      Comments: Tele - NSR rate 80s   A/V epicardial wires set VVI @ 50   Pulmonary:      Effort: Pulmonary effort is normal. No respiratory distress.      Comments: On RA with appropriate oxygenation   Equal rise/fall of chest   Abdominal:      General: Abdomen is flat. There is no distension.      Palpations: Abdomen is soft.      Tenderness: There is no abdominal tenderness.      Comments: Last BM 3/29    Genitourinary:     Comments: Voiding independently   Musculoskeletal:      Right lower leg: No edema.      Left lower leg: No edema.   Skin:     General: Skin is warm and dry.      Capillary Refill: Capillary refill takes less than 2 seconds.      Comments: midsternal chest incision C/D/I, well approximated, no s/s infection, SVG incision C/D/I, well approximated, no s/s infection    Neurological:      General: No focal deficit present.      Mental Status: She is alert and oriented to person, place, and time.   Psychiatric:         Mood and Affect: Mood normal.         Behavior: Behavior normal.       Medications  Scheduled medications  acetaminophen, 650 mg, oral, q8h  aspirin, 81 mg, oral, Daily  atorvastatin, 80 mg, oral, Nightly  carvedilol, 12.5 mg, oral, BID  cholecalciferol, 1,000 Units, oral, Daily  heparin (porcine), 5,000 Units, subcutaneous, q8h  insulin lispro, 0-10 Units, subcutaneous, TID AC  iron polysaccharides, 150 mg, oral, Daily  oxygen, , inhalation, Continuous - 02/gases  polyethylene glycol, 17 g, oral, BID  sennosides-docusate sodium, 2 tablet, oral, BID  valsartan, 80 mg, oral, Daily  vitamin B complex-vitamin C-folic acid, 1 capsule, oral, Daily    Continuous medications   PRN medications  PRN medications: bisacodyl, hydrALAZINE, labetaloL, naloxone, ondansetron **OR** ondansetron, oxyCODONE    Labs  Results for orders placed or performed during the hospital encounter of 03/19/25 (from the past 24 hours)   POCT GLUCOSE   Result Value Ref Range    POCT Glucose 100 (H) 74 -  99 mg/dL   POCT GLUCOSE   Result Value Ref Range    POCT Glucose 185 (H) 74 - 99 mg/dL   Magnesium   Result Value Ref Range    Magnesium 2.56 (H) 1.60 - 2.40 mg/dL   CBC   Result Value Ref Range    WBC 8.4 4.4 - 11.3 x10*3/uL    nRBC 0.0 0.0 - 0.0 /100 WBCs    RBC 2.92 (L) 4.00 - 5.20 x10*6/uL    Hemoglobin 8.7 (L) 12.0 - 16.0 g/dL    Hematocrit 27.3 (L) 36.0 - 46.0 %    MCV 94 80 - 100 fL    MCH 29.8 26.0 - 34.0 pg    MCHC 31.9 (L) 32.0 - 36.0 g/dL    RDW 14.7 (H) 11.5 - 14.5 %    Platelets 138 (L) 150 - 450 x10*3/uL   Renal Function Panel   Result Value Ref Range    Glucose 149 (H) 74 - 99 mg/dL    Sodium 138 136 - 145 mmol/L    Potassium 3.9 3.5 - 5.3 mmol/L    Chloride 106 98 - 107 mmol/L    Bicarbonate 23 21 - 32 mmol/L    Anion Gap 13 10 - 20 mmol/L    Urea Nitrogen 38 (H) 6 - 23 mg/dL    Creatinine 2.06 (H) 0.50 - 1.05 mg/dL    eGFR 26 (L) >60 mL/min/1.73m*2    Calcium 9.0 8.6 - 10.6 mg/dL    Phosphorus 2.1 (L) 2.5 - 4.9 mg/dL    Albumin 3.3 (L) 3.4 - 5.0 g/dL   POCT GLUCOSE   Result Value Ref Range    POCT Glucose 143 (H) 74 - 99 mg/dL   POCT GLUCOSE   Result Value Ref Range    POCT Glucose 153 (H) 74 - 99 mg/dL     =========================  IMAGING/ DIAGNOSTIC TESTING:  I have personally reviewed the following test result(s):    ========================  XR CHEST 1 VIEW  3/28/2025 3:57 am   IMPRESSION:  1.  Interval increase in left retrocardiac opacity which may  represent atelectasis though interval aspiration or developing  infection are not excluded.  2. Stable trace left pleural effusion. No pneumothorax.  3. Medical devices as above.    XR chest 2 views 03/30/2025  Impression  1.  Continued postoperative bibasilar atelectasis and effusions  without pneumothorax.          ================  IMPRESSION & PLAN:  ===============  POD #4 s/p CABGx3 (LIMA-LAD, SVG-PDA, and SVG-OM)  - Increase activity/ ambulation; PT/OT  - Encourage IS, C/DB; respiratory therapy; wean O2 as lore   - Cardiac rehab referral    - Continue cardiac meds: ASA, BB, statin   - continue start ACE-I as tolerated   - Pain and anticonstipation meds  -  1 right pleural and 1 left pleural in place to -20cm suction;   - Maintain chest tubes until meet criteria to remove; monitor daily 1v PCXR while chest tubes in place   - 2v CXR 3/30/2025   - Postop echo not necessary for isolated CABG   - Cut epicardial wires prior to discharge   - Tele until discharge  - Optimize nutrition and electrolytes    Rhythm  - Tele: NSR rate 90s   - continue carvedilol 12.5mg BID   - Adjust medications as tolerated    Hypertension: home meds: carvedilol 12.5mg BID, imdur 30mg daily, valsartan 80mg daily   Systolic (24hrs), Av , Min:112 , Max:165   - continue carvedilol 12.5mg BID  - resume home valsartan 80 mg daily (has been taking since May 2024 with creat 1.99 in Aug 2024)  - additional antihypertensives as needed    HLD: home meds: atorvastatin 20mg daily  - Continue atorvastatin 80mg daily     DM: home meds: jardiace 10mg daily   Lab Results   Component Value Date    HGBA1C 6.5 (H) 2025     Results from last 7 days   Lab Units 25  1110 25  0749 25  2044 25  1711 25  1215 25  0807 25  2115   POCT GLUCOSE mg/dL 153* 143* 185* 100* 153* 135* 166*   -accuchecks premeal and at bedtime  -diabetic diet  -lispro premeal corrective scale   -holding home jardiance     CKD: baseline SCr 1.8-2.5.  Creatinine   Date Value Ref Range Status   2025 2.06 (H) 0.50 - 1.05 mg/dL Final   2025 2.09 (H) 0.50 - 1.05 mg/dL Final   2025 2.10 (H) 0.50 - 1.05 mg/dL Final   2025 2.07 (H) 0.50 - 1.05 mg/dL Final   -daily RFP  -diuretics as indicated  -renally dose all medications  -avoid nephrotoxic drugs  -accurate I/Os     Acute Blood Loss Anemia   Recent Labs     25  0706 25  0714 25  0046 25  1530 25  0258 25  1538 25  0607   HGB 8.7* 8.4* 8.9* 9.3* 9.2* 10.7* 9.8*   HCT  27.3* 26.6* 28.1* 26.9* 28.4* 32.6* 31.7*   - MV, PO Iron x1mo  - Daily labs, transfuse as indicated    Thrombocytopenia  Recent Labs     25  0706 25  0714 25  0046 25  1530 25  0258 25  1538 25  0607   * 104* 90* 95* 92* 98* 165   - Etiology likely postop/CPB related  - Continue to trend with daily CBCs    Volume/Electrolyte Status: Preop wt Weight: 59 kg (130 lb 1.1 oz)   Vitals:    25   Weight: 60.1 kg (132 lb 6.4 oz)   - Weights: 60.1, 58.5kg (3/26)  - Adjust diuresis as needed for postop cardiac surgery hypervolemia  - Replete electrolytes for hypokalemia/hypomagnesemia/hypophosphatemia as needed   - Daily weights and strict I&Os  - Daily RFP while admitted    Leukocytosis  Recent Labs     25  0706 25  0714 25  0046 25  1530 25  0258 25  1538 25  0607   WBC 8.4 10.0 10.2 11.1 8.2 10.6 5.4     Temp (36hrs), Av °C (98.6 °F), Min:36.5 °C (97.7 °F), Max:38 °C (100.4 °F)   - aggressive pulmonary hygiene  - monitor for s/s infection  - likely atelectasis/ postoperative in etiology  - daily CBC to follow    VTE Prophylaxis: SCDs/TEDs, ambulation, SQ heparin  Code Status: Full Code    Dispo  - PT/OT recs low intensity   - Would benefit from homecare RN for cardiac surgery carepath  - Anticipate discharge 1-2 days pending volume status, BP control   - Will continue to assess discharge needs      LORENZO Beasley-CNP  Cardiac Surgery INGE  [facility] ([facility]  Team Phone [phone]    3/30/2025  12:41 PM

## 2025-03-30 NOTE — CARE PLAN
Problem: Safety - Adult  Goal: Free from fall injury  Outcome: Progressing     Problem: Discharge Planning  Goal: Discharge to home or other facility with appropriate resources  Outcome: Progressing     Problem: Chronic Conditions and Co-morbidities  Goal: Patient's chronic conditions and co-morbidity symptoms are monitored and maintained or improved  Outcome: Progressing     Problem: Nutrition  Goal: Nutrient intake appropriate for maintaining nutritional needs  Outcome: Progressing     Problem: Diabetes  Goal: Achieve decreasing blood glucose levels by end of shift  Outcome: Progressing  Goal: Increase stability of blood glucose readings by end of shift  Outcome: Progressing  Goal: Decrease in ketones present in urine by end of shift  Outcome: Progressing  Goal: Maintain electrolyte levels within acceptable range throughout shift  Outcome: Progressing  Goal: Maintain glucose levels >70mg/dl to <250mg/dl throughout shift  Outcome: Progressing  Goal: No changes in neurological exam by end of shift  Outcome: Progressing  Goal: Learn about and adhere to nutrition recommendations by end of shift  Outcome: Progressing  Goal: Vital signs within normal range for age by end of shift  Outcome: Progressing  Goal: Increase self care and/or family involovement by end of shift  Outcome: Progressing  Goal: Receive DSME education by end of shift  Outcome: Progressing     Problem: Pain  Goal: Takes deep breaths with improved pain control throughout the shift  Outcome: Progressing  Goal: Turns in bed with improved pain control throughout the shift  Outcome: Progressing  Goal: Walks with improved pain control throughout the shift  Outcome: Progressing  Goal: Performs ADL's with improved pain control throughout shift  Outcome: Progressing  Goal: Participates in PT with improved pain control throughout the shift  Outcome: Progressing  Goal: Free from opioid side effects throughout the shift  Outcome: Progressing  Goal: Free from  acute confusion related to pain meds throughout the shift  Outcome: Progressing     Problem: Meds/Post-op Pain  Goal: Pain controlled to tolerate pain level  Outcome: Progressing  Goal: Tolerates prescribed medication  Outcome: Progressing     Problem: DVT/VTE Prevention/Activity  Goal: No decrease in circulation/sensation  Outcome: Progressing  Goal: Prevent skin breakdown  Outcome: Progressing  Goal: Return to preop oxygenation status  Outcome: Progressing  Goal: Tolerates optimal activity  Outcome: Progressing  Goal: Increase self care and/or family involvement in 24 hrs.  Outcome: Progressing     Problem: Wound care/infection prevention  Goal: No signs of infection in 24 hrs.  Outcome: Progressing  Goal: No unexpected bleeding from incision this shift  Outcome: Progressing     Problem: Diet/fluid balance  Goal: Adequate urinary output  Outcome: Progressing  Goal: Free from nausea/vomiting  Outcome: Progressing  Goal: Return in bowel function  Outcome: Progressing  Goal: Tolerates prescribed diet  Outcome: Progressing     Problem: Other goals  Goal: No change in neurological status  Outcome: Progressing  Goal: Stabilize vital signs (return to 10% of baseline)  Outcome: Progressing     Problem: Skin  Goal: Decreased wound size/increased tissue granulation at next dressing change  Outcome: Progressing  Goal: Participates in plan/prevention/treatment measures  Outcome: Progressing  Goal: Prevent/manage excess moisture  Outcome: Progressing  Flowsheets (Taken 3/30/2025 0910)  Prevent/manage excess moisture: Moisturize dry skin  Goal: Prevent/minimize sheer/friction injuries  Outcome: Progressing  Goal: Promote/optimize nutrition  Outcome: Progressing  Goal: Promote skin healing  Outcome: Progressing     Problem: Heart Failure  Goal: Improved gas exchange this shift  Outcome: Progressing  Goal: Improved urinary output this shift  Outcome: Progressing  Goal: Reduction in peripheral edema within 24 hours  Outcome:  Progressing  Goal: Report improvement of dyspnea/breathlessness this shift  Outcome: Progressing  Goal: Weight from fluid excess reduced over 2-3 days, then stabilize  Outcome: Progressing  Goal: Increase self care and/or family involvement in 24 hours  Outcome: Progressing

## 2025-03-31 LAB
ALBUMIN SERPL BCP-MCNC: 2.9 G/DL (ref 3.4–5)
ANION GAP SERPL CALC-SCNC: 12 MMOL/L (ref 10–20)
BUN SERPL-MCNC: 30 MG/DL (ref 6–23)
CALCIUM SERPL-MCNC: 8.5 MG/DL (ref 8.6–10.6)
CHLORIDE SERPL-SCNC: 108 MMOL/L (ref 98–107)
CO2 SERPL-SCNC: 22 MMOL/L (ref 21–32)
CREAT SERPL-MCNC: 1.94 MG/DL (ref 0.5–1.05)
EGFRCR SERPLBLD CKD-EPI 2021: 28 ML/MIN/1.73M*2
ERYTHROCYTE [DISTWIDTH] IN BLOOD BY AUTOMATED COUNT: 14.4 % (ref 11.5–14.5)
GLUCOSE BLD MANUAL STRIP-MCNC: 128 MG/DL (ref 74–99)
GLUCOSE BLD MANUAL STRIP-MCNC: 137 MG/DL (ref 74–99)
GLUCOSE BLD MANUAL STRIP-MCNC: 161 MG/DL (ref 74–99)
GLUCOSE BLD MANUAL STRIP-MCNC: 162 MG/DL (ref 74–99)
GLUCOSE SERPL-MCNC: 123 MG/DL (ref 74–99)
HCT VFR BLD AUTO: 24.6 % (ref 36–46)
HGB BLD-MCNC: 7.8 G/DL (ref 12–16)
MAGNESIUM SERPL-MCNC: 2.29 MG/DL (ref 1.6–2.4)
MCH RBC QN AUTO: 29.5 PG (ref 26–34)
MCHC RBC AUTO-ENTMCNC: 31.7 G/DL (ref 32–36)
MCV RBC AUTO: 93 FL (ref 80–100)
NRBC BLD-RTO: 0 /100 WBCS (ref 0–0)
PHOSPHATE SERPL-MCNC: 2 MG/DL (ref 2.5–4.9)
PLATELET # BLD AUTO: 161 X10*3/UL (ref 150–450)
POTASSIUM SERPL-SCNC: 3.9 MMOL/L (ref 3.5–5.3)
RBC # BLD AUTO: 2.64 X10*6/UL (ref 4–5.2)
SODIUM SERPL-SCNC: 138 MMOL/L (ref 136–145)
WBC # BLD AUTO: 6.4 X10*3/UL (ref 4.4–11.3)

## 2025-03-31 PROCEDURE — 1200000002 HC GENERAL ROOM WITH TELEMETRY DAILY

## 2025-03-31 PROCEDURE — 83735 ASSAY OF MAGNESIUM: CPT | Performed by: NURSE PRACTITIONER

## 2025-03-31 PROCEDURE — 2500000005 HC RX 250 GENERAL PHARMACY W/O HCPCS: Performed by: NURSE PRACTITIONER

## 2025-03-31 PROCEDURE — 82947 ASSAY GLUCOSE BLOOD QUANT: CPT

## 2025-03-31 PROCEDURE — 2500000001 HC RX 250 WO HCPCS SELF ADMINISTERED DRUGS (ALT 637 FOR MEDICARE OP): Performed by: NURSE PRACTITIONER

## 2025-03-31 PROCEDURE — 2500000004 HC RX 250 GENERAL PHARMACY W/ HCPCS (ALT 636 FOR OP/ED): Performed by: NURSE PRACTITIONER

## 2025-03-31 PROCEDURE — 2500000002 HC RX 250 W HCPCS SELF ADMINISTERED DRUGS (ALT 637 FOR MEDICARE OP, ALT 636 FOR OP/ED): Performed by: NURSE PRACTITIONER

## 2025-03-31 PROCEDURE — 85027 COMPLETE CBC AUTOMATED: CPT | Performed by: NURSE PRACTITIONER

## 2025-03-31 PROCEDURE — 36415 COLL VENOUS BLD VENIPUNCTURE: CPT | Performed by: NURSE PRACTITIONER

## 2025-03-31 PROCEDURE — 99232 SBSQ HOSP IP/OBS MODERATE 35: CPT

## 2025-03-31 PROCEDURE — 80069 RENAL FUNCTION PANEL: CPT | Performed by: NURSE PRACTITIONER

## 2025-03-31 PROCEDURE — 2500000002 HC RX 250 W HCPCS SELF ADMINISTERED DRUGS (ALT 637 FOR MEDICARE OP, ALT 636 FOR OP/ED)

## 2025-03-31 PROCEDURE — 92526 ORAL FUNCTION THERAPY: CPT | Mod: GN | Performed by: SPEECH-LANGUAGE PATHOLOGIST

## 2025-03-31 PROCEDURE — 2500000001 HC RX 250 WO HCPCS SELF ADMINISTERED DRUGS (ALT 637 FOR MEDICARE OP)

## 2025-03-31 RX ORDER — POTASSIUM CHLORIDE 20 MEQ/1
20 TABLET, EXTENDED RELEASE ORAL ONCE
Status: COMPLETED | OUTPATIENT
Start: 2025-03-31 | End: 2025-03-31

## 2025-03-31 RX ADMIN — CHOLECALCIFEROL TAB 25 MCG (1000 UNIT) 1000 UNITS: 25 TAB at 08:45

## 2025-03-31 RX ADMIN — INSULIN LISPRO 2 UNITS: 100 INJECTION, SOLUTION INTRAVENOUS; SUBCUTANEOUS at 14:43

## 2025-03-31 RX ADMIN — HEPARIN SODIUM 5000 UNITS: 5000 INJECTION, SOLUTION INTRAVENOUS; SUBCUTANEOUS at 08:47

## 2025-03-31 RX ADMIN — POLYSACCHARIDE-IRON COMPLEX 150 MG: 150 CAPSULE ORAL at 08:45

## 2025-03-31 RX ADMIN — POTASSIUM CHLORIDE 20 MEQ: 1500 TABLET, EXTENDED RELEASE ORAL at 12:29

## 2025-03-31 RX ADMIN — Medication 21 L/MIN: at 08:18

## 2025-03-31 RX ADMIN — ATORVASTATIN CALCIUM 80 MG: 80 TABLET, FILM COATED ORAL at 21:29

## 2025-03-31 RX ADMIN — ASPIRIN 81 MG: 81 TABLET, COATED ORAL at 08:44

## 2025-03-31 RX ADMIN — CARVEDILOL 12.5 MG: 12.5 TABLET, FILM COATED ORAL at 08:45

## 2025-03-31 RX ADMIN — HEPARIN SODIUM 5000 UNITS: 5000 INJECTION, SOLUTION INTRAVENOUS; SUBCUTANEOUS at 02:18

## 2025-03-31 RX ADMIN — ASCORBIC ACID, THIAMINE MONONITRATE,RIBOFLAVIN, NIACINAMIDE, PYRIDOXINE HYDROCHLORIDE, FOLIC ACID, CYANOCOBALAMIN, BIOTIN, CALCIUM PANTOTHENATE, 1 CAPSULE: 100; 1.5; 1.7; 20; 10; 1; 6000; 150000; 5 CAPSULE, LIQUID FILLED ORAL at 08:44

## 2025-03-31 RX ADMIN — HEPARIN SODIUM 5000 UNITS: 5000 INJECTION, SOLUTION INTRAVENOUS; SUBCUTANEOUS at 17:15

## 2025-03-31 RX ADMIN — ACETAMINOPHEN 650 MG: 325 TABLET ORAL at 12:31

## 2025-03-31 RX ADMIN — HYDRALAZINE HYDROCHLORIDE 10 MG: 20 INJECTION INTRAMUSCULAR; INTRAVENOUS at 21:49

## 2025-03-31 RX ADMIN — ACETAMINOPHEN 650 MG: 325 TABLET ORAL at 05:34

## 2025-03-31 RX ADMIN — VALSARTAN 80 MG: 80 TABLET, FILM COATED ORAL at 08:45

## 2025-03-31 RX ADMIN — ACETAMINOPHEN 650 MG: 325 TABLET ORAL at 21:29

## 2025-03-31 RX ADMIN — CARVEDILOL 12.5 MG: 12.5 TABLET, FILM COATED ORAL at 21:29

## 2025-03-31 ASSESSMENT — COGNITIVE AND FUNCTIONAL STATUS - GENERAL
CLIMB 3 TO 5 STEPS WITH RAILING: A LITTLE
DAILY ACTIVITIY SCORE: 24
DAILY ACTIVITIY SCORE: 24
MOBILITY SCORE: 23
MOBILITY SCORE: 23
CLIMB 3 TO 5 STEPS WITH RAILING: A LITTLE

## 2025-03-31 ASSESSMENT — PAIN DESCRIPTION - LOCATION: LOCATION: CHEST

## 2025-03-31 ASSESSMENT — PAIN - FUNCTIONAL ASSESSMENT
PAIN_FUNCTIONAL_ASSESSMENT: 0-10
PAIN_FUNCTIONAL_ASSESSMENT: 0-10

## 2025-03-31 ASSESSMENT — PAIN DESCRIPTION - DESCRIPTORS: DESCRIPTORS: DISCOMFORT

## 2025-03-31 ASSESSMENT — PAIN SCALES - GENERAL
PAINLEVEL_OUTOF10: 4
PAINLEVEL_OUTOF10: 4

## 2025-03-31 NOTE — CARE PLAN
The patient's goals for the shift include      The clinical goals for the shift include Patient will remain safe and free from injury/falls throughout this shift.      Problem: Safety - Adult  Goal: Free from fall injury  Outcome: Progressing     Problem: Discharge Planning  Goal: Discharge to home or other facility with appropriate resources  Outcome: Progressing     Problem: Diabetes  Goal: Achieve decreasing blood glucose levels by end of shift  Outcome: Progressing  Goal: Increase stability of blood glucose readings by end of shift  Outcome: Progressing  Goal: Decrease in ketones present in urine by end of shift  Outcome: Progressing  Goal: Maintain electrolyte levels within acceptable range throughout shift  Outcome: Progressing  Goal: Maintain glucose levels >70mg/dl to <250mg/dl throughout shift  Outcome: Progressing  Goal: No changes in neurological exam by end of shift  Outcome: Progressing  Goal: Learn about and adhere to nutrition recommendations by end of shift  Outcome: Progressing  Goal: Vital signs within normal range for age by end of shift  Outcome: Progressing  Goal: Increase self care and/or family involovement by end of shift  Outcome: Progressing  Goal: Receive DSME education by end of shift  Outcome: Progressing     Problem: Pain  Goal: Takes deep breaths with improved pain control throughout the shift  Outcome: Progressing  Goal: Turns in bed with improved pain control throughout the shift  Outcome: Progressing  Goal: Walks with improved pain control throughout the shift  Outcome: Progressing  Goal: Performs ADL's with improved pain control throughout shift  Outcome: Progressing  Goal: Participates in PT with improved pain control throughout the shift  Outcome: Progressing  Goal: Free from opioid side effects throughout the shift  Outcome: Progressing  Goal: Free from acute confusion related to pain meds throughout the shift  Outcome: Progressing     Problem: DVT/VTE Prevention/Activity  Goal:  No decrease in circulation/sensation  Outcome: Progressing  Goal: Prevent skin breakdown  Outcome: Progressing  Goal: Return to preop oxygenation status  Outcome: Progressing  Goal: Tolerates optimal activity  Outcome: Progressing  Goal: Increase self care and/or family involvement in 24 hrs.  Outcome: Progressing     Problem: Wound care/infection prevention  Goal: No signs of infection in 24 hrs.  Outcome: Progressing  Goal: No unexpected bleeding from incision this shift  Outcome: Progressing     Problem: Other goals  Goal: No change in neurological status  Outcome: Progressing  Goal: Stabilize vital signs (return to 10% of baseline)  Outcome: Progressing     Problem: Skin  Goal: Decreased wound size/increased tissue granulation at next dressing change  Outcome: Progressing  Goal: Participates in plan/prevention/treatment measures  Outcome: Progressing  Goal: Prevent/manage excess moisture  Outcome: Progressing  Goal: Prevent/minimize sheer/friction injuries  Outcome: Progressing  Goal: Promote/optimize nutrition  Outcome: Progressing  Goal: Promote skin healing  Outcome: Progressing

## 2025-03-31 NOTE — PROGRESS NOTES
Speech-Language Pathology  Adult Inpatient Swallow Treatment    Patient Name: Cornelia Macias  MRN: [id number]  Today's Date: 3/31/2025   Start Time: 1114  Stop Time: 1129  Time Calculation (min): 15    Impression:   Dysphagia therapy completed. Pt just finishing late breakfast. Vocal quality stronger as pt endorses is back to normal. Use compensatory safe swallow strategies are utilized with trials of solids and liquids x 10 each. No overt s/s of airway invasion evident. Pt ready to advance to a regular diet with continue use of safe swallow strategies. No further SLP services required at this time, however if there is a change in medical condition or increase difficulty swallowing please re-consult SLP. Discuss result and recommendations with RN and MD.      Recommendations:  Regular/Thin liquids     Strategies:   Small bites/sips  Single sips of thin liquids  Alternate bites/sips  Position upright in bed during and after for 45 min    Goal:   Pt will tolerate least restrictive diet without s/s aspiration, respiratory compromise, or overt difficulty throughout admission.  Start: 03/27/25, End: 4/11/25  Status: goal achieved  Pt will utilize safe swallow strategies independently during meals 100% of the time.              Start: 03/28/25, End: 4/11/25  Status: goal iachieved       Plan:  SLP Services Indicated: No  Frequency: N/A  Discussed POC with patient  SLP - OK to Discharge    Pain:   0-10  0 = No pain.     Inpatient Education:  Extensive education provided to patient regarding current swallow function, recommendations/results, and POC.      Consultations/Referrals/Coordination of Services:   N/A

## 2025-03-31 NOTE — PROGRESS NOTES
"CARDIAC SURGERY DAILY PROGRESS NOTE    Ms. Cornelia Macias is a 64 y.o. female history notable for CHF, HLD, DM2, CAD, depression, Hepatitis C tx M[place] 2018 with SVR (hepatitis C is cured, with last RNA in 2021 undetectable), now s/p CABGx3 with Dr. Lopez. Now s/p CABGx3 (LIMA-LAD, SVG-OM, SVG-PDA), KATHERINE clip, posterior pericardial window on 3/26 with Dr. Lopez.    03/26/2025 OPERATION/PROCEDURE: with Dr. Lopez   1. Coronary artery bypass grafting x3              In situ left internal mammary artery to left anterior descending coronary artery bypass               Greater saphenous vein to posterior descending coronary artery bypass               Greater saphenous vein to obtuse marginal coronary artery bypass   2. Endoscopic harvest of right greater saphenous vein   3. Medistim flow probe analysis of bypass grafts   4. Left atrial appendage clip with 40mm AtriClip  5. Posterior pericardial window    CTICU course: HTN with MAPs 130s  Transferred to T3: 3/28  =======================================  Interval History:   No acute events overnight    SUBJECTIVE:  Doing well this AM. Excited to be discharged tomorrow.     Objective   /74 (BP Location: Left arm, Patient Position: Sitting)   Pulse 101   Temp 37.4 °C (99.3 °F) (Temporal)   Resp 18   Ht 1.575 m (5' 2\")   Wt 59.1 kg (130 lb 4.8 oz)   SpO2 97%   BMI 23.83 kg/m²   0-10 (Numeric) Pain Score: 4   3 Day Weight Change: Unable to Calculate    Intake and Output    Intake/Output Summary (Last 24 hours) at 3/31/2025 1142  Last data filed at 3/31/2025 0900  Gross per 24 hour   Intake 480 ml   Output 620 ml   Net -140 ml       Physical Exam  Physical Exam  Vitals and nursing note reviewed.   Constitutional:       General: She is not in acute distress.     Appearance: Normal appearance.      Comments: Elderly female sitting up in chair    HENT:      Head: Normocephalic and atraumatic.      Mouth/Throat:      Mouth: Mucous membranes are moist.   Eyes:      " Conjunctiva/sclera: Conjunctivae normal.   Cardiovascular:      Rate and Rhythm: Normal rate and regular rhythm.      Pulses: Normal pulses.      Comments: Tele - NSR rate 90s  Wires cut   Pulmonary:      Effort: Pulmonary effort is normal. No respiratory distress.      Comments: On RA   Mildly diminished bilaterally   Abdominal:      General: Abdomen is flat. Bowel sounds are normal. There is no distension.      Palpations: Abdomen is soft.      Tenderness: There is no abdominal tenderness.      Comments: Last BM 3/31   Genitourinary:     Comments: Voiding independently   Musculoskeletal:      Cervical back: Neck supple.      Right lower leg: No edema.      Left lower leg: No edema.      Comments: COUGHLIN   Skin:     General: Skin is warm and dry.      Capillary Refill: Capillary refill takes less than 2 seconds.      Comments: midsternal chest incision C/D/I, well approximated, no s/s infection, SVG incision C/D/I, well approximated, no s/s infection    Neurological:      General: No focal deficit present.      Mental Status: She is alert and oriented to person, place, and time.   Psychiatric:         Mood and Affect: Mood normal.         Behavior: Behavior normal.       Medications  Scheduled medications  acetaminophen, 650 mg, oral, q8h  aspirin, 81 mg, oral, Daily  atorvastatin, 80 mg, oral, Nightly  carvedilol, 12.5 mg, oral, BID  cholecalciferol, 1,000 Units, oral, Daily  heparin (porcine), 5,000 Units, subcutaneous, q8h  insulin lispro, 0-10 Units, subcutaneous, TID AC  iron polysaccharides, 150 mg, oral, Daily  oxygen, , inhalation, Continuous - 02/gases  polyethylene glycol, 17 g, oral, BID  potassium chloride CR, 20 mEq, oral, Once  sennosides-docusate sodium, 2 tablet, oral, BID  valsartan, 80 mg, oral, Daily  vitamin B complex-vitamin C-folic acid, 1 capsule, oral, Daily    Continuous medications   PRN medications  PRN medications: bisacodyl, hydrALAZINE, labetaloL, naloxone, ondansetron **OR** ondansetron,  oxyCODONE    Labs  Results for orders placed or performed during the hospital encounter of 03/19/25 (from the past 24 hours)   POCT GLUCOSE   Result Value Ref Range    POCT Glucose 115 (H) 74 - 99 mg/dL   POCT GLUCOSE   Result Value Ref Range    POCT Glucose 115 (H) 74 - 99 mg/dL   Magnesium   Result Value Ref Range    Magnesium 2.29 1.60 - 2.40 mg/dL   CBC   Result Value Ref Range    WBC 6.4 4.4 - 11.3 x10*3/uL    nRBC 0.0 0.0 - 0.0 /100 WBCs    RBC 2.64 (L) 4.00 - 5.20 x10*6/uL    Hemoglobin 7.8 (L) 12.0 - 16.0 g/dL    Hematocrit 24.6 (L) 36.0 - 46.0 %    MCV 93 80 - 100 fL    MCH 29.5 26.0 - 34.0 pg    MCHC 31.7 (L) 32.0 - 36.0 g/dL    RDW 14.4 11.5 - 14.5 %    Platelets 161 150 - 450 x10*3/uL   Renal Function Panel   Result Value Ref Range    Glucose 123 (H) 74 - 99 mg/dL    Sodium 138 136 - 145 mmol/L    Potassium 3.9 3.5 - 5.3 mmol/L    Chloride 108 (H) 98 - 107 mmol/L    Bicarbonate 22 21 - 32 mmol/L    Anion Gap 12 10 - 20 mmol/L    Urea Nitrogen 30 (H) 6 - 23 mg/dL    Creatinine 1.94 (H) 0.50 - 1.05 mg/dL    eGFR 28 (L) >60 mL/min/1.73m*2    Calcium 8.5 (L) 8.6 - 10.6 mg/dL    Phosphorus 2.0 (L) 2.5 - 4.9 mg/dL    Albumin 2.9 (L) 3.4 - 5.0 g/dL   POCT GLUCOSE   Result Value Ref Range    POCT Glucose 128 (H) 74 - 99 mg/dL     =========================  IMAGING/ DIAGNOSTIC TESTING:  I have personally reviewed the following test result(s):    ========================  XR CHEST 1 VIEW  3/28/2025 3:57 am   IMPRESSION:  1.  Interval increase in left retrocardiac opacity which may  represent atelectasis though interval aspiration or developing  infection are not excluded.  2. Stable trace left pleural effusion. No pneumothorax.  3. Medical devices as above.    XR chest 2 views 03/30/2025  Impression  1.  Continued postoperative bibasilar atelectasis and effusions  without pneumothorax.    ================  IMPRESSION & PLAN:  ===============  POD #5 s/p CABGx3 (LIMA-LAD, SVG-PDA, and SVG-OM)  - Increase activity/  ambulation; PT/OT  - Encourage IS, C/DB; respiratory therapy; wean O2 as lore   - Cardiac rehab referral   - Continue cardiac meds: ASA, BB, statin   -  start ACE-I as tolerated   - Pain and anticonstipation meds  - 2v CXR 3/30  - Postop echo not necessary for isolated CABG   - Cut epicardial wires prior to discharge   - Tele until discharge  - Optimize nutrition and electrolytes    Rhythm  - Tele: NSR rate 90s   - continue carvedilol 12.5mg BID   - Adjust medications as tolerated    Hypertension: home meds: carvedilol 12.5mg BID, imdur 30mg daily, valsartan 80mg daily   Systolic (24hrs), Av , Min:115 , Max:156   - continue carvedilol 12.5mg BID  - resume home valsartan 80 mg daily (has been taking since May 2024 with creat 1.99 in Aug 2024)  - additional antihypertensives as needed    HLD: home meds: atorvastatin 20mg daily  - Continue atorvastatin 80mg daily     DM: home meds: jardiace 10mg daily   Lab Results   Component Value Date    HGBA1C 6.5 (H) 2025     Results from last 7 days   Lab Units 25  0812 25  2117 25  1641 25  1110 25  0749 25  2044 25  1711   POCT GLUCOSE mg/dL 128* 115* 115* 153* 143* 185* 100*   -accuchecks premeal and at bedtime  -diabetic diet  -lispro premeal corrective scale   -holding home jardiance     CKD: baseline SCr 1.8-2.5.  Creatinine   Date Value Ref Range Status   2025 1.94 (H) 0.50 - 1.05 mg/dL Final   2025 2.06 (H) 0.50 - 1.05 mg/dL Final   2025 2.09 (H) 0.50 - 1.05 mg/dL Final   -daily RFP  -diuretics as indicated  -renally dose all medications  -avoid nephrotoxic drugs  -accurate I/Os     Acute Blood Loss Anemia   Recent Labs     25  0741 25  0706 25  0714 25  0046 25  1530 25  0258 25  1538   HGB 7.8* 8.7* 8.4* 8.9* 9.3* 9.2* 10.7*   HCT 24.6* 27.3* 26.6* 28.1* 26.9* 28.4* 32.6*   - MV, PO Iron x1mo  - Daily labs, transfuse as indicated    Thrombocytopenia  Recent  Labs     25  0741 25  0706 25  0714 25  0046 25  1530 25  0258 25  1538    138* 104* 90* 95* 92* 98*   - Etiology likely postop/CPB related  - Continue to trend with daily CBCs    Volume/Electrolyte Status: Preop wt Weight: 59 kg (130 lb 1.1 oz)   Vitals:    25 0534   Weight: 59.1 kg (130 lb 4.8 oz)   - Weights: 59.1,60.1, 58.5kg (3/26)  - Adjust diuresis as needed for postop cardiac surgery hypervolemia  - Replete electrolytes for hypokalemia/hypomagnesemia/hypophosphatemia as needed   - Daily weights and strict I&Os  - Daily RFP while admitted    Leukocytosis  Recent Labs     25  0741 25  0706 25  0714 25  0046 25  1530 25  0258 25  1538   WBC 6.4 8.4 10.0 10.2 11.1 8.2 10.6   Temp (36hrs), Av.9 °C (98.5 °F), Min:36.5 °C (97.7 °F), Max:37.4 °C (99.3 °F)  - aggressive pulmonary hygiene  - monitor for s/s infection  - likely atelectasis/ postoperative in etiology  - daily CBC to follow    VTE Prophylaxis: SCDs/TEDs, ambulation, SQ heparin  Code Status: Full Code    Dispo  - PT/OT recs low intensity   - Would benefit from homecare RN for cardiac surgery carepath  - Anticipate discharge tomorrow  - Will continue to assess discharge needs      Monserrat Walker PA-C  Cardiac Surgery [facility]  [facility]  Team Phone [phone]    3/31/2025  11:42 AM

## 2025-03-31 NOTE — PROGRESS NOTES
"Music Therapy Note    Cornelia Macias     Therapy Session  Referral Type: New referral this admission  Visit Type: Follow-up visit  Session Start Time: 1447  Session End Time: 1535  Intervention Delivery: In-person  Conflict of Service: None  Family Present for Session: None     Pre-assessment  Unable to Assess Reason: Outcomes not assessed         Treatment/Interventions  Areas of Focus: Emotional support, Coping, Locus of control, Socialization  Music Therapy Interventions: Live music listening, Iso-principle, Samantha analysis, Music sharing/discussion, Recorded music listening  Interruption: Yes  Interrupted by: Staff  Interruption Duration (min): 8 minutes  Interruption Outcome: Session resumed  Patient Fell Asleep at End of Session: No    Post-assessment  Total Session Time (min): 48 minutes    Narrative  Assessment Detail: Upon MTI arrival, pt sitting up in bed, alert and oriented with postive affect via wide eyes and exited tone used to greet MTI arrival. Pt stated that she will leave tomorrow, and is feeling good. Pt expressed gratitude again for previous session and agreet to music therapy services again.  Plan: MTI will address non musical and musical goals using live music listening, iso principle, samantha analysis, music discussion, and recorded music listening with a focus on locus of control, emotional support, socialization, and coping.  Intervention: MT will engage pt using iso principle by providing an improvised song on piano, adapting chords, rhythmic patterns, and vocals to replicate \"music from the heart\", as requested by pt. Pt engaged by closing eyes and swaying back and forth to the beat of the music. Mt then engaged pt in music discussion and song sharing using song as requested by pt \"I am Woman\" by Kaitlyn Ward (via recording combined with live singing). MT then prompted pt to share the personal significance of song. Pt participated by recalling how it has empowered her and how women should lift " "eachother up. MT prompted pt to share what it was that they were looking forward to getting back home post discharge. Pt then engaged by sharing how they are looking forward to having independence again. MT closed the session with the song \"dancing in the moonlight: by king frandy.  Evaluation: Pt diplayed postive affect via smiling, singing during session. Pt is looking forward to discharge and is guided by lydia through her medical journey.. pt is looking forward to swimming and dancing post discharge. Pt expressed gratitude for music therapy services and invited nurse to listen to MTI after session.  Follow-up: Will follow up with pt if applicable    Education Documentation  No documentation found.          "

## 2025-04-01 ENCOUNTER — DOCUMENTATION (OUTPATIENT)
Dept: HOME HEALTH SERVICES | Facility: HOME HEALTH | Age: 65
End: 2025-04-01
Payer: COMMERCIAL

## 2025-04-01 ENCOUNTER — PHARMACY VISIT (OUTPATIENT)
Dept: PHARMACY | Facility: CLINIC | Age: 65
End: 2025-04-01
Payer: COMMERCIAL

## 2025-04-01 ENCOUNTER — HOME HEALTH ADMISSION (OUTPATIENT)
Dept: HOME HEALTH SERVICES | Facility: HOME HEALTH | Age: 65
End: 2025-04-01
Payer: COMMERCIAL

## 2025-04-01 VITALS
BODY MASS INDEX: 24.16 KG/M2 | SYSTOLIC BLOOD PRESSURE: 101 MMHG | OXYGEN SATURATION: 92 % | DIASTOLIC BLOOD PRESSURE: 57 MMHG | WEIGHT: 131.3 LBS | HEART RATE: 89 BPM | HEIGHT: 62 IN | TEMPERATURE: 96.6 F | RESPIRATION RATE: 18 BRPM

## 2025-04-01 LAB
ALBUMIN SERPL BCP-MCNC: 3.1 G/DL (ref 3.4–5)
ANION GAP SERPL CALC-SCNC: 12 MMOL/L (ref 10–20)
BUN SERPL-MCNC: 24 MG/DL (ref 6–23)
CALCIUM SERPL-MCNC: 8.6 MG/DL (ref 8.6–10.6)
CHLORIDE SERPL-SCNC: 107 MMOL/L (ref 98–107)
CO2 SERPL-SCNC: 22 MMOL/L (ref 21–32)
CREAT SERPL-MCNC: 1.89 MG/DL (ref 0.5–1.05)
EGFRCR SERPLBLD CKD-EPI 2021: 29 ML/MIN/1.73M*2
ERYTHROCYTE [DISTWIDTH] IN BLOOD BY AUTOMATED COUNT: 14.3 % (ref 11.5–14.5)
GLUCOSE BLD MANUAL STRIP-MCNC: 133 MG/DL (ref 74–99)
GLUCOSE SERPL-MCNC: 134 MG/DL (ref 74–99)
HCT VFR BLD AUTO: 24.3 % (ref 36–46)
HGB BLD-MCNC: 7.7 G/DL (ref 12–16)
MAGNESIUM SERPL-MCNC: 2.07 MG/DL (ref 1.6–2.4)
MCH RBC QN AUTO: 29.5 PG (ref 26–34)
MCHC RBC AUTO-ENTMCNC: 31.7 G/DL (ref 32–36)
MCV RBC AUTO: 93 FL (ref 80–100)
NRBC BLD-RTO: 0 /100 WBCS (ref 0–0)
PHOSPHATE SERPL-MCNC: 1.9 MG/DL (ref 2.5–4.9)
PLATELET # BLD AUTO: 169 X10*3/UL (ref 150–450)
POTASSIUM SERPL-SCNC: 4.3 MMOL/L (ref 3.5–5.3)
RBC # BLD AUTO: 2.61 X10*6/UL (ref 4–5.2)
SODIUM SERPL-SCNC: 137 MMOL/L (ref 136–145)
WBC # BLD AUTO: 7.9 X10*3/UL (ref 4.4–11.3)

## 2025-04-01 PROCEDURE — 2500000002 HC RX 250 W HCPCS SELF ADMINISTERED DRUGS (ALT 637 FOR MEDICARE OP, ALT 636 FOR OP/ED): Performed by: NURSE PRACTITIONER

## 2025-04-01 PROCEDURE — RXMED WILLOW AMBULATORY MEDICATION CHARGE

## 2025-04-01 PROCEDURE — 2500000004 HC RX 250 GENERAL PHARMACY W/ HCPCS (ALT 636 FOR OP/ED): Performed by: NURSE PRACTITIONER

## 2025-04-01 PROCEDURE — 85027 COMPLETE CBC AUTOMATED: CPT | Performed by: NURSE PRACTITIONER

## 2025-04-01 PROCEDURE — 2500000001 HC RX 250 WO HCPCS SELF ADMINISTERED DRUGS (ALT 637 FOR MEDICARE OP)

## 2025-04-01 PROCEDURE — 2500000005 HC RX 250 GENERAL PHARMACY W/O HCPCS: Performed by: NURSE PRACTITIONER

## 2025-04-01 PROCEDURE — 2500000001 HC RX 250 WO HCPCS SELF ADMINISTERED DRUGS (ALT 637 FOR MEDICARE OP): Performed by: NURSE PRACTITIONER

## 2025-04-01 PROCEDURE — 83735 ASSAY OF MAGNESIUM: CPT | Performed by: NURSE PRACTITIONER

## 2025-04-01 PROCEDURE — 36415 COLL VENOUS BLD VENIPUNCTURE: CPT | Performed by: NURSE PRACTITIONER

## 2025-04-01 PROCEDURE — 82947 ASSAY GLUCOSE BLOOD QUANT: CPT

## 2025-04-01 PROCEDURE — 84132 ASSAY OF SERUM POTASSIUM: CPT | Performed by: NURSE PRACTITIONER

## 2025-04-01 PROCEDURE — 99239 HOSP IP/OBS DSCHRG MGMT >30: CPT | Performed by: NURSE PRACTITIONER

## 2025-04-01 RX ORDER — IRON POLYSACCHARIDE COMPLEX 150 MG
150 CAPSULE ORAL DAILY
Qty: 30 CAPSULE | Refills: 0 | Status: SHIPPED | OUTPATIENT
Start: 2025-04-01 | End: 2025-05-01

## 2025-04-01 RX ORDER — ACETAMINOPHEN 325 MG/1
650 TABLET ORAL EVERY 6 HOURS PRN
COMMUNITY
Start: 2025-04-01 | End: 2025-04-01

## 2025-04-01 RX ORDER — ACETAMINOPHEN 325 MG/1
650 TABLET ORAL EVERY 6 HOURS PRN
Qty: 100 TABLET | Refills: 0 | Status: SHIPPED | OUTPATIENT
Start: 2025-04-01

## 2025-04-01 RX ORDER — DOCUSATE SODIUM 100 MG/1
100 CAPSULE, LIQUID FILLED ORAL 2 TIMES DAILY PRN
Qty: 60 CAPSULE | Refills: 0 | Status: SHIPPED | OUTPATIENT
Start: 2025-04-01

## 2025-04-01 RX ORDER — ATORVASTATIN CALCIUM 80 MG/1
80 TABLET, FILM COATED ORAL NIGHTLY
Qty: 30 TABLET | Refills: 0 | Status: SHIPPED | OUTPATIENT
Start: 2025-04-01 | End: 2026-04-01

## 2025-04-01 RX ADMIN — CHOLECALCIFEROL TAB 25 MCG (1000 UNIT) 1000 UNITS: 25 TAB at 08:49

## 2025-04-01 RX ADMIN — VALSARTAN 80 MG: 80 TABLET, FILM COATED ORAL at 08:49

## 2025-04-01 RX ADMIN — ACETAMINOPHEN 650 MG: 325 TABLET ORAL at 04:45

## 2025-04-01 RX ADMIN — ASPIRIN 81 MG: 81 TABLET, COATED ORAL at 08:49

## 2025-04-01 RX ADMIN — ASCORBIC ACID, THIAMINE MONONITRATE,RIBOFLAVIN, NIACINAMIDE, PYRIDOXINE HYDROCHLORIDE, FOLIC ACID, CYANOCOBALAMIN, BIOTIN, CALCIUM PANTOTHENATE, 1 CAPSULE: 100; 1.5; 1.7; 20; 10; 1; 6000; 150000; 5 CAPSULE, LIQUID FILLED ORAL at 08:49

## 2025-04-01 RX ADMIN — Medication 21 PERCENT: at 08:52

## 2025-04-01 RX ADMIN — CARVEDILOL 12.5 MG: 12.5 TABLET, FILM COATED ORAL at 08:49

## 2025-04-01 RX ADMIN — HEPARIN SODIUM 5000 UNITS: 5000 INJECTION, SOLUTION INTRAVENOUS; SUBCUTANEOUS at 04:44

## 2025-04-01 RX ADMIN — POLYSACCHARIDE-IRON COMPLEX 150 MG: 150 CAPSULE ORAL at 08:49

## 2025-04-01 ASSESSMENT — COGNITIVE AND FUNCTIONAL STATUS - GENERAL
CLIMB 3 TO 5 STEPS WITH RAILING: A LITTLE
MOBILITY SCORE: 23
DAILY ACTIVITIY SCORE: 24

## 2025-04-01 ASSESSMENT — PAIN SCALES - GENERAL
PAINLEVEL_OUTOF10: 3
PAINLEVEL_OUTOF10: 3

## 2025-04-01 ASSESSMENT — PAIN DESCRIPTION - DESCRIPTORS: DESCRIPTORS: DISCOMFORT

## 2025-04-01 ASSESSMENT — PAIN - FUNCTIONAL ASSESSMENT: PAIN_FUNCTIONAL_ASSESSMENT: 0-10

## 2025-04-01 NOTE — NURSING NOTE
Discharge instructions reviewed with patient. Medication education provided with teach-back. IV and telemetry monitor removed. No further questions or concerns from patient at this time. Patient awaiting transport to [facility].  Nitza Galvan RN

## 2025-04-01 NOTE — HH CARE COORDINATION
Home Care received a Referral for Nursing and Physical Therapy. We have processed the referral for a Start of Care on 04/02/2025.     If you have any questions or concerns, please feel free to contact us at [phone]. Follow the prompts, enter your five digit zip code, and you will be directed to your care team on [facility].

## 2025-04-01 NOTE — CARE PLAN
Problem: Safety - Adult  Goal: Free from fall injury  Outcome: Progressing     Problem: Discharge Planning  Goal: Discharge to home or other facility with appropriate resources  Outcome: Progressing     Problem: Chronic Conditions and Co-morbidities  Goal: Patient's chronic conditions and co-morbidity symptoms are monitored and maintained or improved  Outcome: Progressing     Problem: Nutrition  Goal: Nutrient intake appropriate for maintaining nutritional needs  Outcome: Progressing     Problem: Diabetes  Goal: Achieve decreasing blood glucose levels by end of shift  Outcome: Progressing  Goal: Increase stability of blood glucose readings by end of shift  Outcome: Progressing  Goal: Decrease in ketones present in urine by end of shift  Outcome: Progressing  Goal: Maintain electrolyte levels within acceptable range throughout shift  Outcome: Progressing  Goal: Maintain glucose levels >70mg/dl to <250mg/dl throughout shift  Outcome: Progressing  Goal: No changes in neurological exam by end of shift  Outcome: Progressing  Goal: Learn about and adhere to nutrition recommendations by end of shift  Outcome: Progressing  Goal: Vital signs within normal range for age by end of shift  Outcome: Progressing  Goal: Increase self care and/or family involovement by end of shift  Outcome: Progressing  Goal: Receive DSME education by end of shift  Outcome: Progressing     Problem: Pain  Goal: Takes deep breaths with improved pain control throughout the shift  Outcome: Progressing  Goal: Turns in bed with improved pain control throughout the shift  Outcome: Progressing  Goal: Walks with improved pain control throughout the shift  Outcome: Progressing  Goal: Performs ADL's with improved pain control throughout shift  Outcome: Progressing  Goal: Participates in PT with improved pain control throughout the shift  Outcome: Progressing  Goal: Free from opioid side effects throughout the shift  Outcome: Progressing  Goal: Free from  acute confusion related to pain meds throughout the shift  Outcome: Progressing     Problem: Meds/Post-op Pain  Goal: Pain controlled to tolerate pain level  Outcome: Progressing  Goal: Tolerates prescribed medication  Outcome: Progressing     Problem: DVT/VTE Prevention/Activity  Goal: No decrease in circulation/sensation  Outcome: Progressing  Goal: Prevent skin breakdown  Outcome: Progressing  Goal: Return to preop oxygenation status  Outcome: Progressing  Goal: Tolerates optimal activity  Outcome: Progressing  Goal: Increase self care and/or family involvement in 24 hrs.  Outcome: Progressing     Problem: Wound care/infection prevention  Goal: No signs of infection in 24 hrs.  Outcome: Progressing  Goal: No unexpected bleeding from incision this shift  Outcome: Progressing     Problem: Diet/fluid balance  Goal: Adequate urinary output  Outcome: Progressing  Goal: Free from nausea/vomiting  Outcome: Progressing  Goal: Return in bowel function  Outcome: Progressing  Goal: Tolerates prescribed diet  Outcome: Progressing     Problem: Other goals  Goal: No change in neurological status  Outcome: Progressing  Goal: Stabilize vital signs (return to 10% of baseline)  Outcome: Progressing     Problem: Skin  Goal: Decreased wound size/increased tissue granulation at next dressing change  Outcome: Progressing  Goal: Participates in plan/prevention/treatment measures  Outcome: Progressing  Goal: Prevent/manage excess moisture  Outcome: Progressing  Goal: Prevent/minimize sheer/friction injuries  Outcome: Progressing  Goal: Promote/optimize nutrition  Outcome: Progressing  Goal: Promote skin healing  Outcome: Progressing     Problem: Heart Failure  Goal: Improved gas exchange this shift  Outcome: Progressing  Goal: Improved urinary output this shift  Outcome: Progressing  Goal: Reduction in peripheral edema within 24 hours  Outcome: Progressing  Goal: Report improvement of dyspnea/breathlessness this shift  Outcome:  Progressing  Goal: Weight from fluid excess reduced over 2-3 days, then stabilize  Outcome: Progressing  Goal: Increase self care and/or family involvement in 24 hours  Outcome: Progressing     Problem: Fall/Injury  Goal: Not fall by end of shift  Outcome: Progressing  Goal: Be free from injury by end of the shift  Outcome: Progressing  Goal: Verbalize understanding of personal risk factors for fall in the hospital  Outcome: Progressing  Goal: Verbalize understanding of risk factor reduction measures to prevent injury from fall in the home  Outcome: Progressing  Goal: Use assistive devices by end of the shift  Outcome: Progressing  Goal: Pace activities to prevent fatigue by end of the shift  Outcome: Progressing       The clinical goals for the shift include Patient will remain hemodynamically stable throughout shift.  Nitza Galvan RN

## 2025-04-01 NOTE — PROGRESS NOTES
[facility] confirmed SOC for tomorrow. Medical team and patient notified. Lillian Porras RN, TCC

## 2025-04-01 NOTE — PROGRESS NOTES
"CARDIAC SURGERY DAILY PROGRESS NOTE    Ms. Cornelia Macias is a 64 y.o. female history notable for CHF, HLD, DM2, CAD, depression, Hepatitis C tx Mavyret 2018 with SVR (hepatitis C is cured, with last RNA in 2021 undetectable), now s/p CABGx3 with Dr. Lopez. Now s/p CABGx3 (LIMA-LAD, SVG-OM, SVG-PDA), KATHERINE clip, posterior pericardial window on 3/26 with Dr. Lopez.    03/26/2025 OPERATION/PROCEDURE: with Dr. Lopez   1. Coronary artery bypass grafting x3              In situ left internal mammary artery to left anterior descending coronary artery bypass               Greater saphenous vein to posterior descending coronary artery bypass               Greater saphenous vein to obtuse marginal coronary artery bypass   2. Endoscopic harvest of right greater saphenous vein   3. Medistim flow probe analysis of bypass grafts   4. Left atrial appendage clip with 40mm AtriClip  5. Posterior pericardial window    CTICU course: HTN with MAPs 130s  Transferred to T3: 3/28  =======================================  Interval History:   No acute events overnight    SUBJECTIVE:  Doing well this AM. Excited to be discharged.     Objective   /76 (BP Location: Right arm, Patient Position: Sitting)   Pulse 102   Temp 36.7 °C (98.1 °F) (Temporal)   Resp 18   Ht 1.575 m (5' 2\")   Wt 59.6 kg (131 lb 4.8 oz)   SpO2 94%   BMI 24.02 kg/m²   0-10 (Numeric) Pain Score: 3   3 Day Weight Change: Unable to Calculate    Intake and Output    Intake/Output Summary (Last 24 hours) at 4/1/2025 0928  Last data filed at 4/1/2025 0451  Gross per 24 hour   Intake 360 ml   Output 1000 ml   Net -640 ml       Physical Exam  Physical Exam  Vitals and nursing note reviewed.   Constitutional:       General: She is not in acute distress.     Appearance: Normal appearance.      Comments: Elderly female sitting up in chair    HENT:      Head: Normocephalic and atraumatic.      Mouth/Throat:      Mouth: Mucous membranes are moist.   Eyes:      " Conjunctiva/sclera: Conjunctivae normal.   Cardiovascular:      Rate and Rhythm: Normal rate and regular rhythm.      Pulses: Normal pulses.      Comments: Tele - NSR rate 90s  Wires cut 3/31  Pulmonary:      Effort: Pulmonary effort is normal. No respiratory distress.      Comments: On RA   Mildly diminished bilaterally   Abdominal:      General: Abdomen is flat. Bowel sounds are normal. There is no distension.      Palpations: Abdomen is soft.      Tenderness: There is no abdominal tenderness.      Comments: Last BM 3/31   Genitourinary:     Comments: Voiding independently   Musculoskeletal:      Cervical back: Neck supple.      Right lower leg: No edema.      Left lower leg: No edema.      Comments: COUGHLIN   Skin:     General: Skin is warm and dry.      Capillary Refill: Capillary refill takes less than 2 seconds.      Comments: midsternal chest incision C/D/I, well approximated, no s/s infection, SVG incision C/D/I, well approximated, no s/s infection    Neurological:      General: No focal deficit present.      Mental Status: She is alert and oriented to person, place, and time.   Psychiatric:         Mood and Affect: Mood normal.         Behavior: Behavior normal.       Medications  Scheduled medications  acetaminophen, 650 mg, oral, q8h  aspirin, 81 mg, oral, Daily  atorvastatin, 80 mg, oral, Nightly  carvedilol, 12.5 mg, oral, BID  cholecalciferol, 1,000 Units, oral, Daily  heparin (porcine), 5,000 Units, subcutaneous, q8h  insulin lispro, 0-10 Units, subcutaneous, TID AC  iron polysaccharides, 150 mg, oral, Daily  oxygen, , inhalation, Continuous - 02/gases  polyethylene glycol, 17 g, oral, BID  sennosides-docusate sodium, 2 tablet, oral, BID  valsartan, 80 mg, oral, Daily  vitamin B complex-vitamin C-folic acid, 1 capsule, oral, Daily    Continuous medications   PRN medications  PRN medications: bisacodyl, hydrALAZINE, labetaloL, naloxone, ondansetron **OR** ondansetron, oxyCODONE    Labs  Results for orders  placed or performed during the hospital encounter of 03/19/25 (from the past 24 hours)   POCT GLUCOSE   Result Value Ref Range    POCT Glucose 162 (H) 74 - 99 mg/dL   POCT GLUCOSE   Result Value Ref Range    POCT Glucose 137 (H) 74 - 99 mg/dL   POCT GLUCOSE   Result Value Ref Range    POCT Glucose 161 (H) 74 - 99 mg/dL   Magnesium   Result Value Ref Range    Magnesium 2.07 1.60 - 2.40 mg/dL   CBC   Result Value Ref Range    WBC 7.9 4.4 - 11.3 x10*3/uL    nRBC 0.0 0.0 - 0.0 /100 WBCs    RBC 2.61 (L) 4.00 - 5.20 x10*6/uL    Hemoglobin 7.7 (L) 12.0 - 16.0 g/dL    Hematocrit 24.3 (L) 36.0 - 46.0 %    MCV 93 80 - 100 fL    MCH 29.5 26.0 - 34.0 pg    MCHC 31.7 (L) 32.0 - 36.0 g/dL    RDW 14.3 11.5 - 14.5 %    Platelets 169 150 - 450 x10*3/uL   Renal Function Panel   Result Value Ref Range    Glucose 134 (H) 74 - 99 mg/dL    Sodium 137 136 - 145 mmol/L    Potassium 4.3 3.5 - 5.3 mmol/L    Chloride 107 98 - 107 mmol/L    Bicarbonate 22 21 - 32 mmol/L    Anion Gap 12 10 - 20 mmol/L    Urea Nitrogen 24 (H) 6 - 23 mg/dL    Creatinine 1.89 (H) 0.50 - 1.05 mg/dL    eGFR 29 (L) >60 mL/min/1.73m*2    Calcium 8.6 8.6 - 10.6 mg/dL    Phosphorus 1.9 (L) 2.5 - 4.9 mg/dL    Albumin 3.1 (L) 3.4 - 5.0 g/dL   POCT GLUCOSE   Result Value Ref Range    POCT Glucose 133 (H) 74 - 99 mg/dL     =========================  IMAGING/ DIAGNOSTIC TESTING:  I have personally reviewed the following test result(s):    ========================  XR CHEST 1 VIEW  3/28/2025 3:57 am   IMPRESSION:  1.  Interval increase in left retrocardiac opacity which may  represent atelectasis though interval aspiration or developing  infection are not excluded.  2. Stable trace left pleural effusion. No pneumothorax.  3. Medical devices as above.    XR chest 2 views 03/30/2025  Impression  1.  Continued postoperative bibasilar atelectasis and effusions  without pneumothorax.    ================  IMPRESSION & PLAN:  ===============  POD #6 s/p CABGx3 (LIMA-LAD, SVG-PDA,  and SVG-OM)  - Increase activity/ ambulation; PT/OT  - Encourage IS, C/DB; respiratory therapy; wean O2 as lore   - Cardiac rehab referral   - Continue cardiac meds: ASA, BB, statin   -  continue home ARB  - Pain and anticonstipation meds  - 2v CXR 3/30  - Postop echo not necessary for isolated CABG   - Cut epicardial wires 3/31   - Tele until discharge  - Optimize nutrition and electrolytes    Rhythm  - Tele: NSR rate 90s   - continue carvedilol 12.5mg BID   - Adjust medications as tolerated    Hypertension: home meds: carvedilol 12.5mg BID, imdur 30mg daily, valsartan 80mg daily   Systolic (24hrs), Av , Min:116 , Max:163   - continue carvedilol 12.5mg BID  - resumed home valsartan 80 mg daily (has been taking since May 2024 with creat 1.99 in Aug 2024)  - additional antihypertensives as needed    HLD: home meds: atorvastatin 20mg daily  - Continue atorvastatin 80mg daily     DM: home meds: jardiace 10mg daily   Lab Results   Component Value Date    HGBA1C 6.5 (H) 2025     Results from last 7 days   Lab Units 25  0730 25  2151 25  1734 25  1435 25  0812 25  2117 25  1641   POCT GLUCOSE mg/dL 133* 161* 137* 162* 128* 115* 115*   -accuchecks premeal and at bedtime  -diabetic diet  -lispro premeal corrective scale   -holding home jardiance     CKD: baseline SCr 1.8-2.5.  Creatinine   Date Value Ref Range Status   2025 1.89 (H) 0.50 - 1.05 mg/dL Final   2025 1.94 (H) 0.50 - 1.05 mg/dL Final   2025 2.06 (H) 0.50 - 1.05 mg/dL Final   -daily RFP  -diuretics as indicated  -renally dose all medications  -avoid nephrotoxic drugs  -accurate I/Os     Acute Blood Loss Anemia   Recent Labs     25  0715 25  0741 25  0706 25  0714 25  0046 25  1530 25  0258   HGB 7.7* 7.8* 8.7* 8.4* 8.9* 9.3* 9.2*   HCT 24.3* 24.6* 27.3* 26.6* 28.1* 26.9* 28.4*   - MV, PO Iron x1mo  - Daily labs, transfuse as  indicated    Thrombocytopenia  Recent Labs     25  0715 25  0741 25  0706 25  0714 25  0046 25  1530 25  0258    161 138* 104* 90* 95* 92*   - Etiology likely postop/CPB related  - Continue to trend with daily CBCs    Volume/Electrolyte Status: Preop wt Weight: 59 kg (130 lb 1.1 oz)   Vitals:    25 0459   Weight: 59.6 kg (131 lb 4.8 oz)   - Weights: 59.6, 59.1,60.1, 58.5kg (3/26)  - Adjust diuresis as needed for postop cardiac surgery hypervolemia  - Replete electrolytes for hypokalemia/hypomagnesemia/hypophosphatemia as needed   - Daily weights and strict I&Os  - Daily RFP while admitted    Leukocytosis  Recent Labs     25  0715 25  0741 25  0706 25  0714 25  0046 25  1530 25  0258   WBC 7.9 6.4 8.4 10.0 10.2 11.1 8.2   Temp (36hrs), Av.1 °C (98.7 °F), Min:36.7 °C (98.1 °F), Max:37.4 °C (99.3 °F)  - aggressive pulmonary hygiene  - monitor for s/s infection  - likely atelectasis/ postoperative in etiology  - daily CBC to follow    VTE Prophylaxis: SCDs/TEDs, ambulation, SQ heparin  Code Status: Full Code    Dispo  - PT/OT recs low intensity   - Would benefit from homecare RN for cardiac surgery carepath  - Anticipate discharge today  - Will continue to assess discharge needs      LORENZO Beasley-CNP  Cardiac Surgery INGE  [facility]  Team Phone [phone]    2025  9:28 AM

## 2025-04-01 NOTE — DISCHARGE SUMMARY
Discharge Diagnosis  CAD, multiple vessel    Issues Requiring Follow-Up  Follow up with PCP, cardiology, and cardiac surgery after discharge     Test Results Pending At Discharge  Pending Labs       No current pending labs.            Hospital Course  Ms. Cornelia Macias is a 64 y.o. female history notable for CHF, HLD, DM2, CAD, depression, Hepatitis C tx Mavyret 2018 with SVR (hepatitis C is cured, with last RNA in 2021 undetectable), now s/p CABGx3 with Dr. Lopez. Now s/p CABGx3 (LIMA-LAD, SVG-OM, SVG-PDA), KATHERINE clip, posterior pericardial window on 3/26 with Dr. Lopez.    03/26/2025 OPERATION/PROCEDURE: with Dr. Lopez   1. Coronary artery bypass grafting x3              In situ left internal mammary artery to left anterior descending coronary artery bypass               Greater saphenous vein to posterior descending coronary artery bypass               Greater saphenous vein to obtuse marginal coronary artery bypass   2. Endoscopic harvest of right greater saphenous vein   3. Medistim flow probe analysis of bypass grafts   4. Left atrial appendage clip with 40mm AtriClip  5. Posterior pericardial window    CTICU course: HTN with MAPs 130s    Transferred to T3: 3/28    ===============================================  Floor Course:  - Patient was diuresed for fluid volume overload post cardiac surgery; Preop weight: 59kg, discharge wt: 59.6 kg  - On ASA, statin, BB, ARB by discharge  - Epicardial wires CUT on 3/31  - Telemetry at discharge SR 80s-90s  - 2v CXR done 3/30  - creatinine within baseline of 1.8-2.5; 4/1 = 1.89 at discharge  - Cardiac rehab referral was placed  - PT recs low intensity therapy  - Anticipate discharge to home with homecare    Discharged on 4/1/2025            On day of discharge, vital signs were stable and no acute distress was noted. All questions were answered. After VS and labs were reviewed it was determined the patient was stable for discharge.     Hospital day of discharge  management- spent >30 minutes coordinating the discharge and counseling/educating patient and family regarding discharge instructions.        Past Medical History:  Diagnosis Date  · CHF (congestive heart failure)    · Coronary artery disease    · Hyperlipidemia    · Hypertension    · Type 2 diabetes mellitus         Surgical History  Past Surgical History:  Procedure Laterality Date  · CARDIAC CATHETERIZATION N/A 3/21/2025    Procedure: Left Heart Cath;  Surgeon: Hussain Musa MD;  Location: [place] [address] Cardiac Cath Lab;  Service: Cardiovascular;  Laterality: N/A;       Prescriptions Prior to Admission  Medications Prior to Admission  Medication Sig Dispense Refill Last Dose/Taking  · aspirin 81 mg EC tablet Take 1 tablet (81 mg) by mouth once daily.        · atorvastatin (Lipitor) 20 mg tablet Take 1 tablet (20 mg) by mouth once daily. 90 tablet 1    · carvedilol (Coreg) 12.5 mg tablet Take 1 tablet (12.5 mg) by mouth 2 times a day. 180 tablet 1    · cholecalciferol (Vitamin D3) 25 MCG (1000 UT) tablet Take 1 tablet (1,000 Units) by mouth once daily. 90 tablet 1    · isosorbide mononitrate ER (Imdur) 30 mg 24 hr tablet Take 1 tablet (30 mg) by mouth once daily. 90 tablet 1    · Jardiance 10 mg Take 1 tablet (10 mg) by mouth once daily. 30 tablet 2    · magnesium glycinate 100 mg magnesium capsule Take 1 capsule (100 mg) by mouth once daily.        · valsartan (Diovan) 80 mg tablet Take 1 tablet (80 mg) by mouth once daily. 90 tablet 1         Lisinopril  Social History  Social History       Tobacco Use  · Smoking status: Every Day      Types: Cigarettes  · Smokeless tobacco: Never  Substance Use Topics  · Alcohol use: Not Currently  · Drug use: Never       Family HistoryExpand by Default  No family history on file.        Pertinent Physical Exam At Time of Discharge  Physical Exam  Please see progress note of 4/1/2025 for physical exam          Home Medications     Medication List      START taking these  medications     acetaminophen 325 mg tablet; Commonly known as: Tylenol; Take 2 tablets   (650 mg) by mouth every 6 hours if needed (pain).   docusate sodium 100 mg capsule; Commonly known as: Colace; Take 1   capsule (100 mg) by mouth 2 times a day as needed for constipation.   iron polysaccharides 150 mg iron capsule; Commonly known as:   Nu-Iron,Niferex; Take 1 capsule (150 mg) by mouth once daily.   vitamin B complex-vitamin C-folic acid 1 mg capsule; Commonly known as:   Nephrocaps; Take 1 capsule by mouth once daily.     CHANGE how you take these medications     atorvastatin 80 mg tablet; Commonly known as: Lipitor; Take 1 tablet (80   mg) by mouth once daily at bedtime.; What changed: medication strength,   how much to take, when to take this     CONTINUE taking these medications     aspirin 81 mg EC tablet   carvedilol 12.5 mg tablet; Commonly known as: Coreg; Take 1 tablet (12.5   mg) by mouth 2 times a day.   cholecalciferol 25 mcg (1000 units) tablet; Commonly known as: Vitamin   D3; Take 1 tablet (1,000 Units) by mouth once daily.   isosorbide mononitrate ER 30 mg 24 hr tablet; Commonly known as: Imdur;   Take 1 tablet (30 mg) by mouth once daily.   Jardiance 10 mg tablet; Generic drug: empagliflozin; Take 1 tablet (10   mg) by mouth once daily.   valsartan 80 mg tablet; Commonly known as: Diovan; Take 1 tablet (80 mg)   by mouth once daily.     STOP taking these medications     magnesium glycinate 100 mg magnesium capsule       Outpatient Follow-Up  Future Appointments   Date Time Provider Department [place]   4/10/2025  8:40 AM CARDSURG [facility] [id number] NURSE [id number] [facility]   4/28/2025  1:00 PM Margaret Valdes MD [id number] [facility]   5/7/2025  2:00 PM Isela Lopez MD [id number] [facility]   6/23/2025  1:20 PM Arden Amador MD [id number] [facility]       Ileana Payne, APRN-CNP   Detail Level: Zone Otc Regimen: Vital Proteins Collagen Peptides Plan: Discussed maybe increasing to Isotretinoin next visit \\nDiscussed Viviscal twice a day or otc Collagen\\nRepeat hcg, lipids \\nReturn to clinic 4 weeks Plan: Secondary to isotretinoin treatment.\\nDiscussed the benefits of Viviscal Pro Supplements, but hesitant on treatment due to patient being a vegan. She will do more research before making a decision. Continue Regimen: Isotretinoin 40 mg 1 pill by mouth once daily

## 2025-04-02 ENCOUNTER — TELEPHONE (OUTPATIENT)
Dept: HOME HEALTH SERVICES | Facility: HOME HEALTH | Age: 65
End: 2025-04-02
Payer: COMMERCIAL

## 2025-04-02 NOTE — TELEPHONE ENCOUNTER
FYI patient was referred to [facility] for nursing and PT upon hospital discharge however her daughter declined services when contacted for scheduling. You were listed as the physician to follow for homecare so I wanted you to be aware.

## 2025-04-04 LAB
BLOOD EXPIRATION DATE: NORMAL
DISPENSE STATUS: NORMAL
PRODUCT BLOOD TYPE: 6200
PRODUCT CODE: NORMAL
UNIT ABO: NORMAL
UNIT NUMBER: NORMAL
UNIT RH: NORMAL
UNIT VOLUME: 350
XM INTEP: NORMAL

## 2025-04-07 ENCOUNTER — TELEPHONE (OUTPATIENT)
Dept: CARDIAC SURGERY | Facility: HOSPITAL | Age: 65
End: 2025-04-07
Payer: COMMERCIAL

## 2025-04-07 DIAGNOSIS — Z95.1 S/P CABG X 3: ICD-10-CM

## 2025-04-07 DIAGNOSIS — D64.9 POSTOPERATIVE ANEMIA: ICD-10-CM

## 2025-04-07 NOTE — TELEPHONE ENCOUNTER
Patient is s/p CABG x 3 / KATHERINE clip on March 26 with Dr. Lopez.  Patient states that her right leg is slightly swollen.  She is unsure if she has any weight changes.  The right leg has a graft incision that is dry and intact.  She also states that she had two brief episodes of dizziness since discharge from the hospital.  Blood pressure during the call was 145/71 and HR 91.  Patient feels well now.  I advised the patient to go to the ER for evaluation for dizziness that doesn't resolve/persists or shortness of breath.  She agreed to the plan and verbalized understanding.  Patient will call us with increased leg edema or weight gain.  She has a nurse phone call with me Thursday morning for follow-up.

## 2025-04-10 ENCOUNTER — TELEMEDICINE CLINICAL SUPPORT (OUTPATIENT)
Dept: CARDIAC SURGERY | Facility: HOSPITAL | Age: 65
End: 2025-04-10
Payer: COMMERCIAL

## 2025-04-10 NOTE — PROGRESS NOTES
A telephone visit (audio only) between oCrnelia Macias (at the originating site) and the provider (at the distant site) was utilized to provide this telehealth service.    Patient is having a telehealth nurse visit today following hospital discharge on April 1, 2025.    Patient is 15 days s/p CABG x 3 / KATHERINE clip with Dr. Lopez.  She feels well, has minimal incision discomfort, and denies chest pain.  She is taking Tylenol as needed for pain.  Sternal incision, graft sites, and chest tube sites are closed without redness or drainage.  She denies shortness of breath and is using the incentive spirometer as instructed.  She is ambulating often with periods of rest as needed.  Patient called our office a few days ago concerned about two brief episodes of dizziness and slight leg edema in her right graft leg.  Patient reports today that both the dizziness and swelling have resolved.  Her appetite is good and she reports no issues with constipation or diarrhea.   Patient is taking all medications as prescribed.    Patient educated on the importance of increasing level of activity with periods of rest as needed.  Patient educated on lifting / pushing / pulling / reaching restrictions.  Patient states that she does not drive.   Encouraged continued use of incentive spirometer to increase lung expansion.      Patient has a cardiology follow-up visit with Dr. Arden Amador on June 23rd.   She has a post-op visit with Dr. Lopez scheduled for May 7th with a chest x-ray prior to the visit.  Patient will call our office with any questions or concerns.  It was a pleasure speaking to Cornelia Macias today.

## 2025-04-11 LAB
ACT BLD: 107 SEC (ref 82–174)
ACT BLD: 454 SEC (ref 82–174)
ACT BLD: 469 SEC (ref 82–174)
ACT BLD: 481 SEC (ref 82–174)
ACT BLD: 484 SEC (ref 82–174)
ACT BLD: 79 SEC (ref 82–174)
ACT BLD: 98 SEC (ref 82–174)

## 2025-04-21 LAB
ATRIAL RATE: 80 BPM
P AXIS: 65 DEGREES
P OFFSET: 210 MS
P ONSET: 151 MS
PR INTERVAL: 148 MS
Q ONSET: 225 MS
QRS COUNT: 14 BEATS
QRS DURATION: 72 MS
QT INTERVAL: 344 MS
QTC CALCULATION(BAZETT): 396 MS
QTC FREDERICIA: 378 MS
R AXIS: 26 DEGREES
T AXIS: 269 DEGREES
T OFFSET: 397 MS
VENTRICULAR RATE: 80 BPM

## 2025-04-28 ENCOUNTER — OFFICE VISIT (OUTPATIENT)
Dept: PRIMARY CARE | Facility: CLINIC | Age: 65
End: 2025-04-28
Payer: COMMERCIAL

## 2025-04-28 VITALS
WEIGHT: 128.6 LBS | BODY MASS INDEX: 23.66 KG/M2 | HEART RATE: 89 BPM | SYSTOLIC BLOOD PRESSURE: 120 MMHG | DIASTOLIC BLOOD PRESSURE: 76 MMHG | RESPIRATION RATE: 16 BRPM | HEIGHT: 62 IN | TEMPERATURE: 97.9 F | OXYGEN SATURATION: 100 %

## 2025-04-28 DIAGNOSIS — E78.2 MIXED HYPERLIPIDEMIA: ICD-10-CM

## 2025-04-28 DIAGNOSIS — Z00.00 ENCOUNTER FOR PREVENTIVE HEALTH EXAMINATION: Primary | ICD-10-CM

## 2025-04-28 DIAGNOSIS — Z95.1 S/P CABG X 3: ICD-10-CM

## 2025-04-28 DIAGNOSIS — I25.10 ATHEROSCLEROSIS OF NATIVE CORONARY ARTERY OF NATIVE HEART WITHOUT ANGINA PECTORIS: ICD-10-CM

## 2025-04-28 PROCEDURE — 1125F AMNT PAIN NOTED PAIN PRSNT: CPT | Performed by: INTERNAL MEDICINE

## 2025-04-28 PROCEDURE — 3078F DIAST BP <80 MM HG: CPT | Performed by: INTERNAL MEDICINE

## 2025-04-28 PROCEDURE — 3074F SYST BP LT 130 MM HG: CPT | Performed by: INTERNAL MEDICINE

## 2025-04-28 PROCEDURE — G0439 PPPS, SUBSEQ VISIT: HCPCS | Performed by: INTERNAL MEDICINE

## 2025-04-28 PROCEDURE — 4010F ACE/ARB THERAPY RXD/TAKEN: CPT | Performed by: INTERNAL MEDICINE

## 2025-04-28 PROCEDURE — 3048F LDL-C <100 MG/DL: CPT | Performed by: INTERNAL MEDICINE

## 2025-04-28 PROCEDURE — 99214 OFFICE O/P EST MOD 30 MIN: CPT | Performed by: INTERNAL MEDICINE

## 2025-04-28 PROCEDURE — 3008F BODY MASS INDEX DOCD: CPT | Performed by: INTERNAL MEDICINE

## 2025-04-28 PROCEDURE — 1111F DSCHRG MED/CURRENT MED MERGE: CPT | Performed by: INTERNAL MEDICINE

## 2025-04-28 PROCEDURE — 99215 OFFICE O/P EST HI 40 MIN: CPT | Performed by: INTERNAL MEDICINE

## 2025-04-28 PROCEDURE — 3044F HG A1C LEVEL LT 7.0%: CPT | Performed by: INTERNAL MEDICINE

## 2025-04-28 PROCEDURE — 1170F FXNL STATUS ASSESSED: CPT | Performed by: INTERNAL MEDICINE

## 2025-04-28 PROCEDURE — G0444 DEPRESSION SCREEN ANNUAL: HCPCS | Performed by: INTERNAL MEDICINE

## 2025-04-28 RX ORDER — IRON POLYSACCHARIDE COMPLEX 150 MG
150 CAPSULE ORAL DAILY
Qty: 90 CAPSULE | Refills: 0 | Status: SHIPPED | OUTPATIENT
Start: 2025-04-28 | End: 2025-07-27

## 2025-04-28 SDOH — ECONOMIC STABILITY: FOOD INSECURITY: WITHIN THE PAST 12 MONTHS, YOU WORRIED THAT YOUR FOOD WOULD RUN OUT BEFORE YOU GOT MONEY TO BUY MORE.: SOMETIMES TRUE

## 2025-04-28 SDOH — ECONOMIC STABILITY: FOOD INSECURITY: WITHIN THE PAST 12 MONTHS, THE FOOD YOU BOUGHT JUST DIDN'T LAST AND YOU DIDN'T HAVE MONEY TO GET MORE.: SOMETIMES TRUE

## 2025-04-28 ASSESSMENT — ENCOUNTER SYMPTOMS
LOSS OF SENSATION IN FEET: 0
RESPIRATORY NEGATIVE: 1
OCCASIONAL FEELINGS OF UNSTEADINESS: 0
DIZZINESS: 1
CONSTITUTIONAL NEGATIVE: 1
DEPRESSION: 0
ENDOCRINE NEGATIVE: 1
WOUND: 1
CONSTIPATION: 1
CARDIOVASCULAR NEGATIVE: 1

## 2025-04-28 ASSESSMENT — PAIN SCALES - GENERAL: PAINLEVEL_OUTOF10: 5

## 2025-04-28 ASSESSMENT — ACTIVITIES OF DAILY LIVING (ADL)
GROCERY_SHOPPING: NEEDS ASSISTANCE
BATHING: NEEDS ASSISTANCE
MANAGING_FINANCES: INDEPENDENT
TAKING_MEDICATION: INDEPENDENT
DRESSING: INDEPENDENT
DOING_HOUSEWORK: NEEDS ASSISTANCE

## 2025-04-28 ASSESSMENT — LIFESTYLE VARIABLES: HOW MANY STANDARD DRINKS CONTAINING ALCOHOL DO YOU HAVE ON A TYPICAL DAY: PATIENT DOES NOT DRINK

## 2025-04-28 NOTE — PROGRESS NOTES
Subjective   Patient ID: Cornelia Macias is a 64 y.o. female with a medical history  CHF, HLD, DM2, CAD, depression, Hepatitis C tx Mavyret 2018 with SVR (hepatitis C is cured, with last RNA in 2021 undetectable), s/p CABGx3 with Dr. Lopez on 03/21.     Since her discharge, has been experiencing mild dizziness and constipation. Has been taking iron supplements, renal caps, colace and tylenol in addition to prior meds. Surgical incision sites at the chest and lower extremity is healing well with minimal scabbing, with no tenderness, redness, warmth, or discharge.     Discharge Medication Changes:      START taking these medications     acetaminophen 325 mg tablet; Commonly known as: Tylenol; Take 2 tablets   (650 mg) by mouth every 6 hours if needed (pain).   docusate sodium 100 mg capsule; Commonly known as: Colace; Take 1   capsule (100 mg) by mouth 2 times a day as needed for constipation.   iron polysaccharides 150 mg iron capsule; Commonly known as:   Nu-Iron,Niferex; Take 1 capsule (150 mg) by mouth once daily.   vitamin B complex-vitamin C-folic acid 1 mg capsule; Commonly known as:   Nephrocaps; Take 1 capsule by mouth once daily.     CHANGE how you take these medications     atorvastatin 80 mg tablet; Commonly known as: Lipitor; Take 1 tablet (80   mg) by mouth once daily at bedtime.; What changed: medication strength,   how much to take, when to take this     CONTINUE taking these medications     aspirin 81 mg EC tablet   carvedilol 12.5 mg tablet; Commonly known as: Coreg; Take 1 tablet (12.5   mg) by mouth 2 times a day.   cholecalciferol 25 mcg (1000 units) tablet; Commonly known as: Vitamin   D3; Take 1 tablet (1,000 Units) by mouth once daily.   isosorbide mononitrate ER 30 mg 24 hr tablet; Commonly known as: Imdur;   Take 1 tablet (30 mg) by mouth once daily.   Jardiance 10 mg tablet; Generic drug: empagliflozin; Take 1 tablet (10   mg) by mouth once daily.   valsartan 80 mg tablet; Commonly known as:  Diovan; Take 1 tablet (80 mg)   by mouth once daily.     STOP taking these medications     magnesium glycinate 100 mg magnesium capsule        Review of Systems   Constitutional: Negative.    HENT: Negative.     Respiratory: Negative.     Cardiovascular: Negative.    Gastrointestinal:  Positive for constipation.   Endocrine: Negative.    Genitourinary: Negative.    Skin:  Positive for wound.   Neurological:  Positive for dizziness.       Objective   There were no vitals taken for this visit.    Physical Exam  Constitutional:       Appearance: Normal appearance.   HENT:      Head: Normocephalic and atraumatic.   Cardiovascular:      Rate and Rhythm: Normal rate and regular rhythm.      Pulses: Normal pulses.      Heart sounds: Normal heart sounds.   Pulmonary:      Effort: Pulmonary effort is normal.      Breath sounds: Normal breath sounds.   Skin:     General: Skin is warm and dry.      Comments: Midsternal chest incision that's well approximated with no signs of infection, SVG incision that's well approximated and no signs of infection    Neurological:      General: No focal deficit present.      Mental Status: She is alert and oriented to person, place, and time. Mental status is at baseline.       Assessment/Plan   Ms. Cornelia Macias is a 64 y.o. female history notable for CHF, HLD, DM2, CAD, depression, Hepatitis C tx Mavyret 2018 with SVR (hepatitis C is cured, with last RNA in 2021 undetectable), s/p CABG, KATHERINE clip, posterior pericardial window on 3/26 with Dr. Lopez. Today, she is doing well with some mild dizziness and constipation. Incision sites are well approximated with no signs of infection.     Plan:  Post-operative care: refill iron supplements and nephro caps    CHF, CAD, HLD, HTN: continue carvedilol 12.5 mg BID, imdur 30 mg daily, valsartan 80 mg, atorvastatin 80 mg nightly, ASA 81 mg daily. Following cardiology.  DM: continue jardiace 10 mg daily  RTO in one month for follow-up         Arlene Mccurdy  Jolly  Medical Student, Year 4  [employer] was present for this encounter.    I evaluated the patient and personally participated in the key components, including history, physical examination and medical decision making.  Greater than 40  minutes were spent in the care and coordination of care of the patient including a depression screening of 7 minutes.

## 2025-05-01 DIAGNOSIS — Z95.1 S/P CABG X 3: ICD-10-CM

## 2025-05-01 RX ORDER — ATORVASTATIN CALCIUM 80 MG/1
80 TABLET, FILM COATED ORAL NIGHTLY
Qty: 90 TABLET | Refills: 1 | Status: SHIPPED | OUTPATIENT
Start: 2025-05-01 | End: 2025-10-28

## 2025-05-21 ENCOUNTER — HOSPITAL ENCOUNTER (OUTPATIENT)
Dept: RADIOLOGY | Facility: CLINIC | Age: 65
Discharge: HOME | End: 2025-05-21
Payer: COMMERCIAL

## 2025-05-21 ENCOUNTER — OFFICE VISIT (OUTPATIENT)
Dept: CARDIAC SURGERY | Facility: CLINIC | Age: 65
End: 2025-05-21
Payer: COMMERCIAL

## 2025-05-21 VITALS
OXYGEN SATURATION: 97 % | HEIGHT: 62 IN | BODY MASS INDEX: 24 KG/M2 | SYSTOLIC BLOOD PRESSURE: 139 MMHG | DIASTOLIC BLOOD PRESSURE: 71 MMHG | WEIGHT: 130.4 LBS | HEART RATE: 91 BPM

## 2025-05-21 DIAGNOSIS — Z95.1 S/P CABG X 3: ICD-10-CM

## 2025-05-21 DIAGNOSIS — F17.200 CURRENT SMOKER: ICD-10-CM

## 2025-05-21 DIAGNOSIS — I25.10 ATHEROSCLEROSIS OF NATIVE CORONARY ARTERY OF NATIVE HEART WITHOUT ANGINA PECTORIS: ICD-10-CM

## 2025-05-21 PROCEDURE — 71046 X-RAY EXAM CHEST 2 VIEWS: CPT | Performed by: RADIOLOGY

## 2025-05-21 PROCEDURE — 99214 OFFICE O/P EST MOD 30 MIN: CPT | Performed by: STUDENT IN AN ORGANIZED HEALTH CARE EDUCATION/TRAINING PROGRAM

## 2025-05-21 PROCEDURE — 71046 X-RAY EXAM CHEST 2 VIEWS: CPT

## 2025-05-21 PROCEDURE — 99024 POSTOP FOLLOW-UP VISIT: CPT | Performed by: STUDENT IN AN ORGANIZED HEALTH CARE EDUCATION/TRAINING PROGRAM

## 2025-05-21 RX ORDER — DIPHENHYDRAMINE HCL 25 MG
4 CAPSULE ORAL AS NEEDED
Qty: 100 EACH | Refills: 0 | Status: SHIPPED | OUTPATIENT
Start: 2025-05-21 | End: 2025-06-20

## 2025-05-21 ASSESSMENT — PATIENT HEALTH QUESTIONNAIRE - PHQ9
SUM OF ALL RESPONSES TO PHQ9 QUESTIONS 1 AND 2: 2
2. FEELING DOWN, DEPRESSED OR HOPELESS: SEVERAL DAYS
1. LITTLE INTEREST OR PLEASURE IN DOING THINGS: SEVERAL DAYS
10. IF YOU CHECKED OFF ANY PROBLEMS, HOW DIFFICULT HAVE THESE PROBLEMS MADE IT FOR YOU TO DO YOUR WORK, TAKE CARE OF THINGS AT HOME, OR GET ALONG WITH OTHER PEOPLE: NOT DIFFICULT AT ALL

## 2025-05-21 ASSESSMENT — ENCOUNTER SYMPTOMS
OCCASIONAL FEELINGS OF UNSTEADINESS: 0
LOSS OF SENSATION IN FEET: 0
DEPRESSION: 0

## 2025-05-21 ASSESSMENT — COLUMBIA-SUICIDE SEVERITY RATING SCALE - C-SSRS
2. HAVE YOU ACTUALLY HAD ANY THOUGHTS OF KILLING YOURSELF?: NO
1. IN THE PAST MONTH, HAVE YOU WISHED YOU WERE DEAD OR WISHED YOU COULD GO TO SLEEP AND NOT WAKE UP?: NO
6. HAVE YOU EVER DONE ANYTHING, STARTED TO DO ANYTHING, OR PREPARED TO DO ANYTHING TO END YOUR LIFE?: NO

## 2025-05-21 ASSESSMENT — PAIN SCALES - GENERAL: PAINLEVEL_OUTOF10: 0-NO PAIN

## 2025-05-21 NOTE — PROGRESS NOTES
Chief Complaint  Post-op evaluation    HPI:  Ms. Cornelia Macias is a 64-year-old female who presented with multivessel CAD s/p CABG x3 (LIMA to LAD, SVG to PDA, SVG to OM), LAAC, and posterior pericardial window on 3/26/25. Her postoperative course was uncomplicated and she was discharged home in good condition on 4/1/25. She presents now for postoperative follow up. She denies pain and dyspnea. She has been tolerating activity well. She walks to [employer], which has been good exercise for her. She is currently smoking 1 cigarette per day and has requested nicotine gum to help her quit. She is tolerating a diet and her appetite has improved. She complains of constipation for which she is taking Colace every other day.     Medical History[1]    Surgical History[2]    Family History[3]    Social History     Socioeconomic History    Marital status: Single     Spouse name: Not on file    Number of children: Not on file    Years of education: Not on file    Highest education level: Not on file   Occupational History    Not on file   Tobacco Use    Smoking status: Every Day     Types: Cigarettes    Smokeless tobacco: Never   Substance and Sexual Activity    Alcohol use: Not Currently    Drug use: Never    Sexual activity: Not on file   Other Topics Concern    Not on file   Social History Narrative    Not on file     Social Drivers of Health     Financial Resource Strain: Low Risk  (3/20/2025)    Overall Financial Resource Strain (CARDIA)     Difficulty of Paying Living Expenses: Not hard at all   Food Insecurity: Food Insecurity Present (4/28/2025)    Hunger Vital Sign     Worried About Running Out of Food in the Last Year: Sometimes true     Ran Out of Food in the Last Year: Sometimes true   Transportation Needs: No Transportation Needs (3/20/2025)    PRAPARE - Transportation     Lack of Transportation (Medical): No     Lack of Transportation (Non-Medical): No   Recent Concern: Transportation Needs - High Risk (2/2/2025)     Received from [facility] SDOH Screening     Has lack of transportation kept you from medical appointments, meetings, work or from getting things needed for daily living? choose all that apply.: Yes, it has kept me from medical appointments or from getting my medications     Has lack of transportation kept you from medical appointments, meetings, work or from getting things needed for daily living? choose all that apply.: Yes, it has kept me from non-medical meetings, appointments, work or from getting things that i need   Physical Activity: Inactive (11/20/2023)    Received from [employer]    Exercise Vital Sign     Days of Exercise per Week: 0 days     Minutes of Exercise per Session: 0 min   Stress: Stress Concern Present (11/20/2023)    Received from [employer]    [employer] [place] [employer] - Occupational Stress Questionnaire     Feeling of Stress : To some extent   Social Connections: Moderately Isolated (11/20/2023)    Received from [employer]    Social Connection and Isolation Panel [NHANES]     Frequency of Communication with Friends and Family: More than three times a week     Frequency of Social Gatherings with Friends and Family: More than three times a week     Attends [employer] Services: More than 4 times per year     Active Member of Clubs or Organizations: No     Attends Club or Organization Meetings: Never     Marital Status: Never    Intimate Partner Violence: Not At Risk (3/20/2025)    Humiliation, Afraid, Rape, and Kick questionnaire     Fear of Current or Ex-Partner: No     Emotionally Abused: No     Physically Abused: No     Sexually Abused: No   Housing Stability: Low Risk  (3/20/2025)    Housing Stability Vital Sign     Unable to Pay for Housing in the Last Year: No     Number of Times Moved in the Last Year: 0     Homeless in the Last Year: No   Recent Concern: Housing Stability - High Risk (2/2/2025)    Received from [facility] SDOH Screening      What is your living situation today?: I have a place to live today, but i am worried about losing it in the future       RX Allergies[4]    Encounter Medications[5]    Physical Exam  Constitutional:       General: She is not in acute distress.     Appearance: Normal appearance. She is not ill-appearing.   HENT:      Head: Normocephalic and atraumatic.   Cardiovascular:      Rate and Rhythm: Normal rate and regular rhythm.   Pulmonary:      Effort: Pulmonary effort is normal. No respiratory distress.      Breath sounds: No wheezing.   Musculoskeletal:      Right lower leg: No edema.      Left lower leg: No edema.   Skin:     General: Skin is warm and dry.      Comments: Well-healed sternal incision. Small eschar at superior aspect of incision. No dehiscence, cellulitis or drainage.    Neurological:      Mental Status: She is alert and oriented to person, place, and time. Mental status is at baseline.   Psychiatric:         Mood and Affect: Mood normal.         Behavior: Behavior normal.       Encounter Date: 03/19/25   Electrocardiogram, 12-lead   Result Value    Ventricular Rate 82    Atrial Rate 82    WY Interval 134    QRS Duration 80    QT Interval 366    QTC Calculation(Bazett) 427    P Axis 71    R Axis 37    T Axis -20    QRS Count 13    Q Onset 223    P Onset 156    P Offset 213    T Offset 406    QTC Fredericia 406    Narrative    Normal sinus rhythm  Nonspecific ST and T wave abnormality  Abnormal ECG  When compared with ECG of 20-MAR-2025 16:56,  No significant change was found  Confirmed by Radhames Balderas ([id number]) on 3/25/2025 4:31:23 AM       Lab Results   Component Value Date    WBC 7.9 04/01/2025    HGB 7.7 (L) 04/01/2025    HCT 24.3 (L) 04/01/2025    MCV 93 04/01/2025     04/01/2025     Lab Results   Component Value Date    GLUCOSE 134 (H) 04/01/2025    CALCIUM 8.6 04/01/2025     04/01/2025    K 4.3 04/01/2025    CO2 22 04/01/2025     04/01/2025    BUN 24 (H) 04/01/2025     CREATININE 1.89 (H) 04/01/2025     Transthoracic Echo (TTE) Complete  Result Date: 3/20/2025   [facility], [address] [address]                Tel [phone] and Fax [phone] TRANSTHORACIC ECHOCARDIOGRAM REPORT  Patient Name:       RAFAEL MCCULLOUGH GARZA      Reading Physician:    [id number] Kingston MD Study Date:         3/20/2025           Ordering Provider:    [id number] ROD SHERWOOD MRN/PID:            [id number]            Fellow: Accession#:         [id number]        Nurse: Date of Birth/Age:  1960 / 64 years Sonographer:          Iris Dailey RDCS Gender assigned at  F                   Additional Staff: Birth: Height:             157.48 cm           Admit Date:           3/19/2025 Weight:             58.06 kg            Admission Status:     Inpatient -                                                               Routine BSA / BMI:          1.58 m2 / 23.41     Encounter#:           [id number]                     kg/m2 Blood Pressure:     136/76 mmHg         Department Location:  [facility]                                                               Non Invasive Study Type:    TRANSTHORACIC ECHO (TTE) COMPLETE Diagnosis/ICD: Atherosclerotic heart disease of native coronary artery without                angina pectoris-I25.10; Chronic systolic (congestive) heart                failure (CHF)-I50.22 Indication:    Coronary artery disease; Chronic systolic congestive heart                failure CPT Code:      Echo Complete w Full [id number] Patient History: Pertinent History: CAD, CHF, HTN, Hyperlipidemia and Cardiomyopathy. CKD, DMII,                    HFrEF 45%. Study Detail: The following Echo studies were performed: 2D, M-Mode, Doppler and               color flow. Technically challenging study due to prominent lung               artifact.   PHYSICIAN INTERPRETATION: Left Ventricle: Left ventricular ejection fraction is low normal, by visual estimate at 50-55%. There are no regional left ventricular wall motion abnormalities. The left ventricular cavity size is normal. There is normal septal and normal posterior left ventricular wall thickness. There is left ventricular concentric remodeling. Spectral Doppler shows a Grade I (impaired relaxation pattern) of left ventricular diastolic filling with normal left atrial filling pressure. Left Atrium: The left atrial size is normal. Right Ventricle: The right ventricle is normal in size. There is normal right ventricular global systolic function. Right Atrium: The right atrium is normal in size. Aortic Valve: The aortic valve is trileaflet. There is trace aortic valve regurgitation. The peak instantaneous gradient of the aortic valve is 4 mmHg. Mitral Valve: The mitral valve is normal in structure. There is trace mitral valve regurgitation. Tricuspid Valve: The tricuspid valve is structurally normal. There is trace tricuspid regurgitation. The Doppler estimated RVSP is within normal limits at 25.3 mmHg. Pulmonic Valve: The pulmonic valve is structurally normal. There is physiologic pulmonic valve regurgitation. Pericardium: There is no pericardial effusion noted. Aorta: The aortic root is normal. Systemic Veins: The inferior vena cava appears normal in size, with IVC inspiratory collapse greater than 50%. In comparison to the previous echocardiogram(s): There are no prior studies on this patient for comparison purposes.  CONCLUSIONS:  1. Left ventricular ejection fraction is low normal, by visual estimate at 50-55%.  2. Spectral Doppler shows a Grade I (impaired relaxation pattern) of left ventricular diastolic filling with normal left atrial filling pressure.  3. There is normal right ventricular global systolic function.  4. Right ventricular systolic pressure is within normal limits. QUANTITATIVE DATA  SUMMARY:  2D MEASUREMENTS:          Normal Ranges: Ao Root d:       2.80 cm  (2.0-3.7cm) LAs:             3.30 cm  (2.7-4.0cm) IVSd:            0.90 cm  (0.6-1.1cm) LVPWd:           0.90 cm  (0.6-1.1cm) LVIDd:           4.20 cm  (3.9-5.9cm) LVIDs:           3.40 cm LV Mass Index:   75 g/m2 LVEDV Index:     85 ml/m2 LV % FS          19.0 %  LEFT ATRIUM:                  Normal Ranges: LA Vol A4C:        35.7 ml    (22+/-6mL/m2) LA Vol A2C:        28.7 ml LA Vol BP:         34.9 ml LA Vol Index A4C:  22.6ml/m2 LA Vol Index A2C:  18.1 ml/m2 LA Vol Index BP:   22.1 ml/m2 LA Area A4C:       13.3 cm2 LA Area A2C:       13.0 cm2 LA Major Axis A4C: 4.2 cm LA Major Axis A2C: 5.0 cm  RIGHT ATRIUM:          Normal Ranges: RA Area A4C:  12.1 cm2  AORTA MEASUREMENTS:         Normal Ranges: Ao Arch:            2.70 cm (2.0-3.6cm)  LV SYSTOLIC FUNCTION:                      Normal Ranges: EF-A4C View:    48 % (>=55%) EF-A2C View:    47 % EF-Biplane:     48 % EF-Visual:      53 % LV EF Reported: 53 %  LV DIASTOLIC FUNCTION:             Normal Ranges: MV Peak E:             0.90 m/s    (0.7-1.2 m/s) MV Peak A:             1.10 m/s    (0.42-0.7 m/s) E/A Ratio:             0.82        (1.0-2.2) MV e'                  0.058 m/s   (>8.0) MV lateral e'          0.08 m/s MV medial e'           0.04 m/s MV A Dur:              121.00 msec E/e' Ratio:            15.53       (<8.0) MV DT:                 232 msec    (150-240 msec)  MITRAL VALVE:          Normal Ranges: MV DT:        232 msec (150-240msec)  AORTIC VALVE:           Normal Ranges: AoV Vmax:      1.01 m/s (<=1.7m/s) AoV Peak P.1 mmHg (<20mmHg) LVOT Max Justin:  0.76 m/s (<=1.1m/s) LVOT VTI:      16.20 cm LVOT Diameter: 1.80 cm  (1.8-2.4cm) AoV Area,Vmax: 1.92 cm2 (2.5-4.5cm2)  RIGHT VENTRICLE: RV Basal 3.40 cm RV Mid   2.60 cm RV Major 6.3 cm TAPSE:   18.6 mm RV s'    0.10 m/s  TRICUSPID VALVE/RVSP:          Normal Ranges: Peak TR Velocity:     2.36 m/s RV Syst Pressure:      25 mmHg  (< 30mmHg) IVC Diam:             1.20 cm  PULMONIC VALVE:          Normal Ranges: PV Accel Time:  127 msec (>120ms) PV Max Justin:     0.8 m/s  (0.6-0.9m/s) PV Max P.5 mmHg  [id number] Nabeel Kingston MD Electronically signed on 3/20/2025 at 10:31:07 AM  ** Final **        Assessment and Plan:   Ms. Cornelia Macias is a 64-year-old female s/p CABG x3 (LIMA to LAD, SVG to PDA, SVG to OM), LAAC, and posterior pericardial window on 3/26/25, recovering well    - She may resume regular activity, including lifting and driving, without restriction (postoperative week 6 - 25)   - She has been referred to cardiac rehab  - Prescription for nicotine gum provided. She was counseled on smoking cessation.   - She may follow up with her PCP. She also has an appointment with cardiology on 25.   - She was advised to contact our office with questions or concerns     Thank you for the opportunity to participate in her care.     Isela Lopez MD  Cardiac Surgeon  Division of Cardiac Surgery  [facility]       [1]   Past Medical History:  Diagnosis Date    CHF (congestive heart failure)     Coronary artery disease     Hyperlipidemia     Hypertension     Type 2 diabetes mellitus    [2]   Past Surgical History:  Procedure Laterality Date    CARDIAC CATHETERIZATION N/A 3/21/2025    Procedure: Left Heart Cath;  Surgeon: Hussain Musa MD;  Location: [place] [address] Cardiac Cath Lab;  Service: Cardiovascular;  Laterality: N/A;   [3] No family history on file.  [4]   Allergies  Allergen Reactions    Lisinopril Cough   [5]   Outpatient Encounter Medications as of 2025   Medication Sig Dispense Refill    acetaminophen (Tylenol) 325 mg tablet Take 2 tablets (650 mg) by mouth every 6 hours if needed (pain). 100 tablet 0    aspirin 81 mg EC tablet Take 1 tablet (81 mg) by mouth once daily.      atorvastatin (Lipitor) 80 mg tablet Take 1 tablet (80 mg) by mouth once daily at  bedtime. 90 tablet 1    carvedilol (Coreg) 12.5 mg tablet Take 1 tablet (12.5 mg) by mouth 2 times a day. 180 tablet 1    cholecalciferol (Vitamin D3) 25 MCG (1000 UT) tablet Take 1 tablet (1,000 Units) by mouth once daily. 90 tablet 1    docusate sodium (Colace) 100 mg capsule Take 1 capsule (100 mg) by mouth 2 times a day as needed for constipation. 60 capsule 0    iron polysaccharides (Nu-Iron,Niferex) 150 mg iron capsule Take 1 capsule (150 mg) by mouth once daily. 90 capsule 0    isosorbide mononitrate ER (Imdur) 30 mg 24 hr tablet Take 1 tablet (30 mg) by mouth once daily. 90 tablet 1    Jardiance 10 mg Take 1 tablet (10 mg) by mouth once daily. 30 tablet 2    valsartan (Diovan) 80 mg tablet Take 1 tablet (80 mg) by mouth once daily. 90 tablet 1    vitamin B complex-vitamin C-folic acid (Nephrocaps) 1 mg capsule Take 1 capsule by mouth once daily. 90 capsule 0     No facility-administered encounter medications on file as of 5/21/2025.

## 2025-05-28 ENCOUNTER — APPOINTMENT (OUTPATIENT)
Dept: PRIMARY CARE | Facility: CLINIC | Age: 65
End: 2025-05-28
Payer: COMMERCIAL

## 2025-05-29 ENCOUNTER — CLINICAL SUPPORT (OUTPATIENT)
Dept: NUTRITION | Facility: CLINIC | Age: 65
End: 2025-05-29
Payer: COMMERCIAL

## 2025-05-29 ENCOUNTER — OFFICE VISIT (OUTPATIENT)
Dept: PRIMARY CARE | Facility: CLINIC | Age: 65
End: 2025-05-29
Payer: COMMERCIAL

## 2025-05-29 VITALS
RESPIRATION RATE: 16 BRPM | DIASTOLIC BLOOD PRESSURE: 75 MMHG | WEIGHT: 128 LBS | HEART RATE: 88 BPM | BODY MASS INDEX: 23.55 KG/M2 | SYSTOLIC BLOOD PRESSURE: 123 MMHG | HEIGHT: 62 IN | TEMPERATURE: 97.3 F | OXYGEN SATURATION: 100 %

## 2025-05-29 DIAGNOSIS — F32.A DEPRESSION, UNSPECIFIED DEPRESSION TYPE: Primary | ICD-10-CM

## 2025-05-29 DIAGNOSIS — I25.85 CHRONIC CORONARY MICROVASCULAR DYSFUNCTION: ICD-10-CM

## 2025-05-29 DIAGNOSIS — E11.69 TYPE 2 DIABETES MELLITUS WITH OTHER SPECIFIED COMPLICATION, WITHOUT LONG-TERM CURRENT USE OF INSULIN: ICD-10-CM

## 2025-05-29 DIAGNOSIS — F17.200 CURRENT SMOKER: ICD-10-CM

## 2025-05-29 DIAGNOSIS — Z95.1 S/P CABG X 3: ICD-10-CM

## 2025-05-29 DIAGNOSIS — N18.30 TYPE 2 DIABETES MELLITUS WITH STAGE 3 CHRONIC KIDNEY DISEASE, WITHOUT LONG-TERM CURRENT USE OF INSULIN, UNSPECIFIED WHETHER STAGE 3A OR 3B CKD (MULTI): Primary | ICD-10-CM

## 2025-05-29 DIAGNOSIS — I10 PRIMARY HYPERTENSION: ICD-10-CM

## 2025-05-29 DIAGNOSIS — E11.22 TYPE 2 DIABETES MELLITUS WITH STAGE 3 CHRONIC KIDNEY DISEASE, WITHOUT LONG-TERM CURRENT USE OF INSULIN, UNSPECIFIED WHETHER STAGE 3A OR 3B CKD (MULTI): Primary | ICD-10-CM

## 2025-05-29 PROCEDURE — 3078F DIAST BP <80 MM HG: CPT | Performed by: INTERNAL MEDICINE

## 2025-05-29 PROCEDURE — 3074F SYST BP LT 130 MM HG: CPT | Performed by: INTERNAL MEDICINE

## 2025-05-29 PROCEDURE — 99214 OFFICE O/P EST MOD 30 MIN: CPT | Performed by: INTERNAL MEDICINE

## 2025-05-29 PROCEDURE — 3008F BODY MASS INDEX DOCD: CPT | Performed by: INTERNAL MEDICINE

## 2025-05-29 PROCEDURE — 1159F MED LIST DOCD IN RCRD: CPT | Performed by: INTERNAL MEDICINE

## 2025-05-29 PROCEDURE — 3048F LDL-C <100 MG/DL: CPT | Performed by: INTERNAL MEDICINE

## 2025-05-29 PROCEDURE — G2211 COMPLEX E/M VISIT ADD ON: HCPCS | Performed by: INTERNAL MEDICINE

## 2025-05-29 PROCEDURE — 4010F ACE/ARB THERAPY RXD/TAKEN: CPT | Performed by: INTERNAL MEDICINE

## 2025-05-29 PROCEDURE — 1125F AMNT PAIN NOTED PAIN PRSNT: CPT | Performed by: INTERNAL MEDICINE

## 2025-05-29 PROCEDURE — 3044F HG A1C LEVEL LT 7.0%: CPT | Performed by: INTERNAL MEDICINE

## 2025-05-29 RX ORDER — DIPHENHYDRAMINE HCL 25 MG
4 CAPSULE ORAL AS NEEDED
Qty: 100 EACH | Refills: 0 | Status: SHIPPED | OUTPATIENT
Start: 2025-05-29 | End: 2025-06-28

## 2025-05-29 RX ORDER — ATORVASTATIN CALCIUM 80 MG/1
80 TABLET, FILM COATED ORAL NIGHTLY
Qty: 90 TABLET | Refills: 1 | Status: SHIPPED | OUTPATIENT
Start: 2025-05-29 | End: 2025-11-25

## 2025-05-29 RX ORDER — EMPAGLIFLOZIN 10 MG/1
10 TABLET, FILM COATED ORAL
Qty: 30 TABLET | Refills: 2 | Status: SHIPPED | OUTPATIENT
Start: 2025-05-29 | End: 2025-08-27

## 2025-05-29 RX ORDER — ISOSORBIDE MONONITRATE 30 MG/1
30 TABLET, EXTENDED RELEASE ORAL DAILY
Qty: 90 TABLET | Refills: 1 | Status: SHIPPED | OUTPATIENT
Start: 2025-05-29 | End: 2025-11-25

## 2025-05-29 RX ORDER — CARVEDILOL 12.5 MG/1
12.5 TABLET ORAL 2 TIMES DAILY
Qty: 180 TABLET | Refills: 1 | Status: SHIPPED | OUTPATIENT
Start: 2025-05-29 | End: 2026-05-29

## 2025-05-29 RX ORDER — VALSARTAN 80 MG/1
80 TABLET ORAL
Qty: 90 TABLET | Refills: 1 | Status: SHIPPED | OUTPATIENT
Start: 2025-05-29 | End: 2025-11-25

## 2025-05-29 ASSESSMENT — ENCOUNTER SYMPTOMS
DIARRHEA: 0
DYSPHORIC MOOD: 1
OCCASIONAL FEELINGS OF UNSTEADINESS: 0
CONSTIPATION: 0
DIZZINESS: 0
LOSS OF SENSATION IN FEET: 0
SHORTNESS OF BREATH: 0
DEPRESSION: 0
VOMITING: 0
NAUSEA: 0

## 2025-05-29 ASSESSMENT — PAIN SCALES - GENERAL: PAINLEVEL_OUTOF10: 6

## 2025-05-29 NOTE — PROGRESS NOTES
"Subjective   Cornelia Macias is a 64 y.o. female with a medical history  CHF, HLD, DM2, CAD, depression, Hepatitis C tx Mavyret 2018 with SVR (hepatitis C is cured, with last RNA in 2021 undetectable), s/p CABGx3 with Dr. Lopez on 03/26/25.    Patient presents for follow up visit. Patient has no acute concerns. Denies shortness of breath, states she occasionally feels chest pain during periods of exertion.     States she has been feeling sad recently and has not been getting out of the house. Completing ADLs and is supported by family at home. Has paratransit but worried about wait times which is her main barrier for leaving the house. Endorses feelings of guilt surrounding smoking. Smokes at most 1 cigarrette a day. States she feels best when she gets out of the house, including going to [place] and is looking forward to starting rehab on June 19th when she will have a place to go 3 days a week. Per patient, she misses dancing and swimming. States she is not currently able to dance but would like to get into the pool if she is able.     Review of Systems   Respiratory:  Negative for shortness of breath.    Cardiovascular:  Negative for leg swelling.   Gastrointestinal:  Negative for constipation, diarrhea, nausea and vomiting.   Neurological:  Negative for dizziness and syncope.   Psychiatric/Behavioral:  Positive for dysphoric mood. Negative for suicidal ideas.        Objective   /75   Pulse 88   Temp 36.3 °C (97.3 °F)   Resp 16   Ht 1.575 m (5' 2\")   Wt 58.1 kg (128 lb)   SpO2 100%   BMI 23.41 kg/m²     Physical Exam  Constitutional:       Appearance: Normal appearance.   Cardiovascular:      Rate and Rhythm: Normal rate and regular rhythm.   Pulmonary:      Effort: Pulmonary effort is normal.      Breath sounds: Normal breath sounds.   Skin:     Comments: Well healing incision, no areas of dehiscence or inflammation   Neurological:      Mental Status: She is alert.         Assessment/Plan   Cornelia MCCULLOUGH" Colleen is a 64 y.o. female with a medical history  CHF, HLD, DM2, CAD, depression, Hepatitis C tx Mavyret 2018 with SVR (hepatitis C is cured, with last RNA in 2021 undetectable), s/p CABGx3. Patient presenting today for routine follow up. Patient endorsed feelings of depression, offered counseling and medication which patient declined at this time. Discussed coping skills with patient. Patient would like to swim, encouraged her to ask her surgeon/rehab providers for clearance. Addressed smoking cessation, patient is highly motivated to quit and struggling with feelings of guilt around continuing smoking. Patient currently smokes on average 1 cigarette a day. Will resend nicorrette gum to the pharmacy. Patient's last mammogram was in 2023, however given recent surgery will postpone until patient has had more time to heal.     1. Smoking cessation: nicorrette gum  2. Post operative care: rehab scheduled, cont to follow with cardiology  3. Depression: coping skills and community support. Refused counseling/medication at this time.   3. CHF, CAD, HLD, HTN:  continue carvedilol 12.5 mg BID, imdur 30 mg daily, valsartan 80 mg, atorvastatin 80 mg nightly, ASA 81 mg daily. Following with cardiology.  4. Mammogram: overdue, will revisit at follow up  4. RTO in 3 months for follow-up    Inna Mariscal, MS4  I evaluated the patient and personally participated in the key components, including history, physical examination and medical decision making.  I agree with the student's findings and plan as documented and have discussed the case and management of the patient's care with the student and patient. Greater than 40  minutes was spent in the care and coordination of care of the patient.

## 2025-05-29 NOTE — PROGRESS NOTES
[employer] [facility]  Diet Recommendation 1: Diabetes  Diet Recommendation 2: Heart Healthy  Food Intolerance Avoidance: NKFA  Household Size: 3 Family Members  Interventions: Referral Number: 2nd 6 Mo Referral 1 yr (Referrals may not be consecutive)  Interventions: Visit Number: 1 of 6 Visits - Max 6 Visits/Referral Each 6 Mo Period  Education Today: [employer]  Follow Up Notes for Future Visits: carlos cassidy for referral  Grains: 0-25% Whole  Fruit: 50-75% Fresh  Vegetables: 50-75% Fresh  Proteins: 1-2 Plant-based Items  Relevant [facility] [employer] Inpatient Discharge Items: no dairy taken  Originating Site of Referral Order: Margaret Valdes MD  Initials of RD Assisting Today: [facility]

## 2025-06-02 PROBLEM — B19.20 HEPATITIS C: Status: RESOLVED | Noted: 2025-03-25 | Resolved: 2025-06-02

## 2025-06-11 PROBLEM — I50.22 CHRONIC SYSTOLIC CHF (CONGESTIVE HEART FAILURE): Status: RESOLVED | Noted: 2023-10-18 | Resolved: 2025-06-11

## 2025-06-19 ENCOUNTER — CLINICAL SUPPORT (OUTPATIENT)
Dept: CARDIAC REHAB | Facility: HOSPITAL | Age: 65
End: 2025-06-19
Payer: COMMERCIAL

## 2025-06-19 VITALS
DIASTOLIC BLOOD PRESSURE: 66 MMHG | BODY MASS INDEX: 24.14 KG/M2 | HEIGHT: 62 IN | WEIGHT: 131.2 LBS | OXYGEN SATURATION: 99 % | SYSTOLIC BLOOD PRESSURE: 134 MMHG | HEART RATE: 85 BPM

## 2025-06-19 DIAGNOSIS — Z95.1 S/P CABG X 3: Primary | ICD-10-CM

## 2025-06-19 ASSESSMENT — PATIENT HEALTH QUESTIONNAIRE - PHQ9
SUM OF ALL RESPONSES TO PHQ QUESTIONS 1-9: 22
2. FEELING DOWN, DEPRESSED OR HOPELESS: NEARLY EVERY DAY
3. TROUBLE FALLING OR STAYING ASLEEP OR SLEEPING TOO MUCH: NEARLY EVERY DAY
8. MOVING OR SPEAKING SO SLOWLY THAT OTHER PEOPLE COULD HAVE NOTICED. OR THE OPPOSITE, BEING SO FIGETY OR RESTLESS THAT YOU HAVE BEEN MOVING AROUND A LOT MORE THAN USUAL: MORE THAN HALF THE DAYS
SUM OF ALL RESPONSES TO PHQ9 QUESTIONS 1 & 2: 5
SUM OF ALL RESPONSES TO PHQ QUESTIONS 1-9: 22
7. TROUBLE CONCENTRATING ON THINGS, SUCH AS READING THE NEWSPAPER OR WATCHING TELEVISION: NEARLY EVERY DAY
5. POOR APPETITE OR OVEREATING: NEARLY EVERY DAY
4. FEELING TIRED OR HAVING LITTLE ENERGY: NEARLY EVERY DAY
6. FEELING BAD ABOUT YOURSELF - OR THAT YOU ARE A FAILURE OR HAVE LET YOURSELF OR YOUR FAMILY DOWN: NEARLY EVERY DAY
9. THOUGHTS THAT YOU WOULD BE BETTER OFF DEAD, OR OF HURTING YOURSELF: NOT AT ALL
1. LITTLE INTEREST OR PLEASURE IN DOING THINGS: MORE THAN HALF THE DAYS

## 2025-06-19 ASSESSMENT — DUKE ACTIVITY SCORE INDEX (DASI)
CAN YOU TAKE CARE OF YOURSELF (EAT, DRESS, BATHE, OR USE TOILET): NO
DASI METS SCORE: 3.6
CAN YOU DO MODERATE WORK AROUND THE HOUSE LIKE VACUUMING, SWEEPING FLOORS OR CARRYING GROCERIES: NO
CAN YOU PARTICIPATE IN STRENOUS SPORTS LIKE SWIMMING, SINGLES TENNIS, FOOTBALL, BASKETBALL, OR SKIING: NO
TOTAL_SCORE: 7.25
CAN YOU RUN A SHORT DISTANCE: NO
CAN YOU DO LIGHT WORK AROUND THE HOUSE LIKE DUSTING OR WASHING DISHES: NO
CAN YOU HAVE SEXUAL RELATIONS: NO
CAN YOU DO HEAVY WORK AROUND THE HOUSE LIKE SCRUBBING FLOORS OR LIFTING AND MOVING HEAVY FURNITURE: NO
CAN YOU DO YARD WORK LIKE RAKING LEAVES, WEEDING OR PUSHING A MOWER: NO
CAN YOU WALK INDOORS, SUCH AS AROUND YOUR HOUSE: YES
CAN YOU CLIMB A FLIGHT OF STAIRS OR WALK UP A HILL: YES
CAN YOU WALK A BLOCK OR TWO ON LEVEL GROUND: NO
CAN YOU PARTICIPATE IN MODERATE RECREATIONAL ACTIVITIES LIKE GOLF, BOWLING, DANCING, DOUBLES TENNIS OR THROWING A BASEBALL OR FOOTBALL: NO

## 2025-06-19 NOTE — PROGRESS NOTES
Cardiac Rehabilitation Initial Treatment Plan    Name: Cornelia Macias   Medical Record Number: [id number]  YOB: 1960   Age: 64 y.o.  Today’s Date: 6/19/2025   Primary Care Physician: Margaret Valdes MD   Referring Physician: Isela Lopez MD   Program Location: [address]  Primary Diagnosis: S/P CABG x 3 [Z95.1]   Onset/Date of Diagnosis: 03/26/25   Session #: 0 / 36  [facility] Risk Stratification: Low   Falls Risk: High    ------------------------------------------------------------------  Psychosocial Initial Assessment:  ------------------------------------------------------------------   Stress Assessment  Pre PHQ-9: 22     Pre PHQ-9: 22     Sent PH-Q 9 to MD if score > 20: To be sent by RN on day 1    Quality of Life Survey: SF-36   SF-36 Pre Post   Physical Component Score TBD TBD   Mental Component Score TBD TBD       Pt reported/currently experiencing stress: Yes; Stress; Severity: marked and Depression; Severity: moderate  Patient uses stress management skills: No   History of: depression  Currently seeing a mental health provider: No  Social Support: Yes, Whom:Jew group, friend and daughter    Learning Assessment  Learning assessment/barriers: Transportation  Preferred learning method: Auditory, Visual, Reading handout, and Writing handout  Barriers: None  Comments:    Stages of Change: Preparation    Psychosocial Plan  Goal Status: Initial Assessment; goals not yet started  Psychosocial Goals: Demonstrating proper techniques for stress management, Maintain or lower PH-Q 9 score by discharge, and Identify strategies for managing depression  Psychosocial Interventions/Education:   To be done in Cardiac Rehab.    ------------------------------------------------------------------  Nutrition Initial Assessment:  ------------------------------------------------------------------  Diet Habit Survey: Picture Your Plate  Pre: Survey to be given during initial exercise  "session.  Post: To be done at discharge.    Survey results reviewed with Dietician: Not at this time    Lipids Assessment  Current Dietary Guidelines: Low fat, Low sodium  Barriers to dietary change: yes financial  Hyperlipidemia: Yes   Lab Results   Component Value Date    CHOL 165 03/19/2025    HDL 43.7 03/19/2025    LDLCALC 85 03/19/2025    TRIG 183 (H) 03/19/2025     Diabetes Assessment  History of Diabetes: Yes Pt monitors BS at home: No   Frequency: N/A    Lab Results   Component Value Date    HGBA1C 6.5 (H) 03/19/2025      Weight Management  Weight: 59.5 kg (131 lb 3.2 oz)  Height: 157.5 cm (5' 2\")  BMI (Calculated): 23.99   Nutrition Plan  Goal Status: Initial Assessment; goals not yet started  Nutrition Goals: Lipid Goal: HDL>45, LDL <70, Total <180, Trigs <150, Improve Diet Habit Survey score by 5-10 points by discharge, Adapt a low-sodium, DASH diet prior to discharge, Adapt a Mediterranean focused diet prior to discharge, Learn how to read and interpret nutrition labels prior to discharge, Maintain body weight, and Improve HgbA1C prior to discharge  Nutrition Interventions/Education:   To be done in Cardiac Rehab.    ------------------------------------------------------------------  Exercise Initial Assessment:  ------------------------------------------------------------------  Home Exercise  Current Home Exercise?: No  Mode: NA  Frequency: NA  Duration: NA   Home Exercise Prescription given: To be given prior to discharge from program.    Exercise Prescription  Exercise Prescription based on: Duke Activity Status Index (DASI): DASI Score: 7.25  MET Score: 3.6       Resistance Training: No   Frequency:  3 days/week  Mode: NuStep and Recumbent Cycle  Duration: 27 total aerobic minutes  Intensity: RPE 12-16  Target HR:  To be calculated after 6 attended sessions.  MET Level: 1.8  Patient wears supplemental O2: No  Modality Workload METs Duration (minutes)   Pre-Exercise      Evergigep 4000 1 load @ 16 " shrot 1.6 7 :00   NuStep 4000 1 load @ 16 short 1.6 7 :00   Recumbent Bike 1 load @ 55 rpms 1.6 7 :00   Post-Exercise           Exercise Plan  Goal Status: Initial Assessment; goals not yet started  Exercise Goals: Increase exercise MET level by 5-10% each week, Increase total exercise duration to 30-45 minutes, Obtain 150 minutes/week of moderate intensity aerobic exercise, and Establish a home exercise program before discharge  Exercise Interventions/Education:   To be done in Cardiac Rehab.    ------------------------------------------------------------------  Other Core Components/Risk Factor Initial Assessment:  ------------------------------------------------------------------  Medication Adherence  Medication compliance: Yes  Uses pill box/organizer: No   Carries medication list: No   Is patient prescribed Nitroglycerin? No  Current Medications:   Medication Documentation Review Audit       Reviewed by Renée Barragan CMA (Medical Assistant) on 05/29/25 at 1011      Medication Order Taking? Sig Documenting Provider Last Dose Status   acetaminophen (Tylenol) 325 mg tablet [id number] Yes Take 2 tablets (650 mg) by mouth every 6 hours if needed (pain). Ileana Payne, APRN-CNP  Active   aspirin 81 mg EC tablet [id number] Yes Take 1 tablet (81 mg) by mouth once daily. Historical Provider, MD  Active   atorvastatin (Lipitor) 80 mg tablet [id number] Yes Take 1 tablet (80 mg) by mouth once daily at bedtime. Margaret Valdes MD  Active   carvedilol (Coreg) 12.5 mg tablet [id number] Yes Take 1 tablet (12.5 mg) by mouth 2 times a day. Margaret Valdes MD  Active   cholecalciferol (Vitamin D3) 25 MCG (1000 UT) tablet [id number] Yes Take 1 tablet (1,000 Units) by mouth once daily. Margaret Valdes MD  Active   docusate sodium (Colace) 100 mg capsule [id number] Yes Take 1 capsule (100 mg) by mouth 2 times a day as needed for constipation. Ileana Payne, APRN-CNP  Active   iron polysaccharides (Nu-Iron,Niferex) 150 mg iron capsule  "[id number] Yes Take 1 capsule (150 mg) by mouth once daily. Margaret Valdes MD  Active   isosorbide mononitrate ER (Imdur) 30 mg 24 hr tablet [id number] Yes Take 1 tablet (30 mg) by mouth once daily. Margaret Valdes MD  Active   Jardiance 10 mg [id number] Yes Take 1 tablet (10 mg) by mouth once daily. Juliana Noel MD  Active   nicotine polacrilex (Nicorette) 4 mg gum [id number] Yes Chew 1 each (4 mg) if needed for smoking cessation. Isela Lopez MD  Active   valsartan (Diovan) 80 mg tablet [id number] Yes Take 1 tablet (80 mg) by mouth once daily. Margaret Valdes MD  Active   vitamin B complex-vitamin C-folic acid (Nephrocaps) 1 mg capsule [id number] Yes Take 1 capsule by mouth once daily. Margaret Valdes MD  Active                     Blood Pressure Management  History of Hypertension: Yes   Medication Changes: No   Resting BP: /66 (BP Location: Right arm, Patient Position: Sitting)   Pulse 85   Ht 1.575 m (5' 2\")   Wt 59.5 kg (131 lb 3.2 oz)   SpO2 99%   BMI 24.00 kg/m²    Heart Failure Management  Hx of Heart Failure: No  Smoking/Tobacco Assessment  Social History[1]   Other Core Component Plan  Goal Status: Initial Assessment; goals not yet started  Other Core Component Goals: Verbalize medication usage and drug actions by discharge, Begin tobacco cessation program, Set tobacco cessation quit date while enrolled, and Achieve resting BP of < 130/80 by discharge  Other Core Component Interventions/Education:   To be done in Cardiac Rehab.    Individual Patient Goals:  To be able to dress and bathe on own by mid-point  To be able to climb stress at home by discharge  Goal Status: Initial Assessment; goals not yet started    Staff Comments:  Initial assessment done In Person at [facility]. Patient would like to attend sessions at 11:00 AM and has been put on the wait list pending class time availability.      Rehab Staff Signature: PAPA Ya          [1]   Social " History  Tobacco Use    Smoking status: Every Day     Types: Cigarettes    Smokeless tobacco: Never   Substance Use Topics    Alcohol use: Not Currently    Drug use: Never

## 2025-06-20 ENCOUNTER — APPOINTMENT (OUTPATIENT)
Dept: PRIMARY CARE | Facility: CLINIC | Age: 65
End: 2025-06-20
Payer: COMMERCIAL

## 2025-06-23 ENCOUNTER — OFFICE VISIT (OUTPATIENT)
Dept: CARDIOLOGY | Facility: HOSPITAL | Age: 65
End: 2025-06-23
Payer: COMMERCIAL

## 2025-06-23 VITALS
SYSTOLIC BLOOD PRESSURE: 108 MMHG | WEIGHT: 131.8 LBS | DIASTOLIC BLOOD PRESSURE: 66 MMHG | OXYGEN SATURATION: 99 % | HEIGHT: 62 IN | BODY MASS INDEX: 24.25 KG/M2 | HEART RATE: 84 BPM

## 2025-06-23 DIAGNOSIS — E11.69 TYPE 2 DIABETES MELLITUS WITH OTHER SPECIFIED COMPLICATION, WITHOUT LONG-TERM CURRENT USE OF INSULIN: ICD-10-CM

## 2025-06-23 DIAGNOSIS — I25.85 CHRONIC CORONARY MICROVASCULAR DYSFUNCTION: Primary | ICD-10-CM

## 2025-06-23 DIAGNOSIS — I10 PRIMARY HYPERTENSION: ICD-10-CM

## 2025-06-23 PROCEDURE — 3044F HG A1C LEVEL LT 7.0%: CPT | Performed by: STUDENT IN AN ORGANIZED HEALTH CARE EDUCATION/TRAINING PROGRAM

## 2025-06-23 PROCEDURE — 4010F ACE/ARB THERAPY RXD/TAKEN: CPT | Performed by: STUDENT IN AN ORGANIZED HEALTH CARE EDUCATION/TRAINING PROGRAM

## 2025-06-23 PROCEDURE — 3074F SYST BP LT 130 MM HG: CPT | Performed by: STUDENT IN AN ORGANIZED HEALTH CARE EDUCATION/TRAINING PROGRAM

## 2025-06-23 PROCEDURE — 1159F MED LIST DOCD IN RCRD: CPT | Performed by: STUDENT IN AN ORGANIZED HEALTH CARE EDUCATION/TRAINING PROGRAM

## 2025-06-23 PROCEDURE — 1125F AMNT PAIN NOTED PAIN PRSNT: CPT | Performed by: STUDENT IN AN ORGANIZED HEALTH CARE EDUCATION/TRAINING PROGRAM

## 2025-06-23 PROCEDURE — 99215 OFFICE O/P EST HI 40 MIN: CPT | Performed by: STUDENT IN AN ORGANIZED HEALTH CARE EDUCATION/TRAINING PROGRAM

## 2025-06-23 PROCEDURE — 99212 OFFICE O/P EST SF 10 MIN: CPT

## 2025-06-23 PROCEDURE — 3048F LDL-C <100 MG/DL: CPT | Performed by: STUDENT IN AN ORGANIZED HEALTH CARE EDUCATION/TRAINING PROGRAM

## 2025-06-23 PROCEDURE — 3078F DIAST BP <80 MM HG: CPT | Performed by: STUDENT IN AN ORGANIZED HEALTH CARE EDUCATION/TRAINING PROGRAM

## 2025-06-23 PROCEDURE — 3008F BODY MASS INDEX DOCD: CPT | Performed by: STUDENT IN AN ORGANIZED HEALTH CARE EDUCATION/TRAINING PROGRAM

## 2025-06-23 RX ORDER — CARVEDILOL 12.5 MG/1
12.5 TABLET ORAL 2 TIMES DAILY
Qty: 180 TABLET | Refills: 2 | Status: SHIPPED | OUTPATIENT
Start: 2025-06-23

## 2025-06-23 RX ORDER — ISOSORBIDE MONONITRATE 30 MG/1
30 TABLET, EXTENDED RELEASE ORAL DAILY
Qty: 90 TABLET | Refills: 2 | Status: SHIPPED | OUTPATIENT
Start: 2025-06-23

## 2025-06-23 RX ORDER — ASPIRIN 81 MG/1
81 TABLET ORAL DAILY
Qty: 90 TABLET | Refills: 2 | Status: SHIPPED | OUTPATIENT
Start: 2025-06-23

## 2025-06-23 RX ORDER — VALSARTAN 80 MG/1
80 TABLET ORAL
Qty: 90 TABLET | Refills: 2 | Status: SHIPPED | OUTPATIENT
Start: 2025-06-23

## 2025-06-23 RX ORDER — ROSUVASTATIN CALCIUM 40 MG/1
40 TABLET, COATED ORAL DAILY
Qty: 30 TABLET | Refills: 1 | Status: SHIPPED | OUTPATIENT
Start: 2025-06-23

## 2025-06-23 ASSESSMENT — COLUMBIA-SUICIDE SEVERITY RATING SCALE - C-SSRS
1. IN THE PAST MONTH, HAVE YOU WISHED YOU WERE DEAD OR WISHED YOU COULD GO TO SLEEP AND NOT WAKE UP?: NO
2. HAVE YOU ACTUALLY HAD ANY THOUGHTS OF KILLING YOURSELF?: NO
6. HAVE YOU EVER DONE ANYTHING, STARTED TO DO ANYTHING, OR PREPARED TO DO ANYTHING TO END YOUR LIFE?: NO

## 2025-06-23 ASSESSMENT — PAIN SCALES - GENERAL: PAINLEVEL_OUTOF10: 8

## 2025-06-24 LAB
ALBUMIN SERPL-MCNC: 4 G/DL (ref 3.6–5.1)
ALP SERPL-CCNC: 45 U/L (ref 37–153)
ALT SERPL-CCNC: 18 U/L (ref 6–29)
ANION GAP SERPL CALCULATED.4IONS-SCNC: 7 MMOL/L (CALC) (ref 7–17)
AST SERPL-CCNC: 22 U/L (ref 10–35)
BILIRUB SERPL-MCNC: 0.3 MG/DL (ref 0.2–1.2)
BUN SERPL-MCNC: 36 MG/DL (ref 7–25)
CALCIUM SERPL-MCNC: 9.2 MG/DL (ref 8.6–10.4)
CHLORIDE SERPL-SCNC: 108 MMOL/L (ref 98–110)
CHOLEST SERPL-MCNC: 149 MG/DL
CHOLEST/HDLC SERPL: 3.2 (CALC)
CO2 SERPL-SCNC: 24 MMOL/L (ref 20–32)
CREAT SERPL-MCNC: 1.8 MG/DL (ref 0.5–1.05)
EGFRCR SERPLBLD CKD-EPI 2021: 31 ML/MIN/1.73M2
EST. AVERAGE GLUCOSE BLD GHB EST-MCNC: 126 MG/DL
EST. AVERAGE GLUCOSE BLD GHB EST-SCNC: 7 MMOL/L
GLUCOSE SERPL-MCNC: 88 MG/DL (ref 65–139)
HBA1C MFR BLD: 6 %
HDLC SERPL-MCNC: 46 MG/DL
LDLC SERPL CALC-MCNC: 76 MG/DL (CALC)
NONHDLC SERPL-MCNC: 103 MG/DL (CALC)
POTASSIUM SERPL-SCNC: 4.6 MMOL/L (ref 3.5–5.3)
PROT SERPL-MCNC: 6.8 G/DL (ref 6.1–8.1)
SODIUM SERPL-SCNC: 139 MMOL/L (ref 135–146)
TRIGL SERPL-MCNC: 170 MG/DL

## 2025-07-06 ASSESSMENT — ENCOUNTER SYMPTOMS
NEAR-SYNCOPE: 0
IRREGULAR HEARTBEAT: 0
ORTHOPNEA: 0
SHORTNESS OF BREATH: 0
PND: 0
DYSPNEA ON EXERTION: 1
CLAUDICATION: 0
PALPITATIONS: 0
SYNCOPE: 0

## 2025-07-06 NOTE — PROGRESS NOTES
[facility] - General Cardiology Note                                                                                        Reason for Visit: Follow-up (Complaints of Shortness of breath , dizziness , ankle swelling right greater than the left per patient, /denies chest pain or palpitations), Hypertension, Coronary Artery Disease, and Cardiomyopathy.  Referring Clinician: No ref. provider found     History of Present Illness  Cornelia Macias is a 65 y.o. female with history of multivessel CAD status post CABG (LIMA to LAD, SVG to PDA, SVG to OM), posterior pericardial window, and KATHERINE C on 3/26/2025.  She presents today for a follow-up visit    List of Active Cardiac Issues:  # multivessel CAD status post CABG (LIMA to LAD, SVG to PDA, SVG to OM), posterior pericardial window, and LAAC on 3/26/2025. She was discharged on 4/1/2025.     # AHA/ACC Stage B CMP: LVEF 45-55%. Likely ischemic in nature.  No HF sx. She is on valsartan 80mg daily, empagliflozin, carvedilol 12.5mg BID.     # HTN: BP is well-controlled on medications as above.     # Prediabetes: A1c 6% - on jardiance.     Interval History:  Patient reports that she has been feeling well after her surgery.  The incision is healing well.  She has been taking all her medications without any side effects.  She denies any presyncope, syncope, chest pain, lower extremity swelling, orthopnea, or PND.    However, she reports shortness of breath on minimal exertion that has been improving.  She has not started cardiac rehabilitation yet.  Patient reports that she has not been consistent taking her atorvastatin due to muscle pain.    Past Medical History  She has a past medical history of CHF (congestive heart failure), Coronary artery disease, Hyperlipidemia, Hypertension, and Type 2 diabetes mellitus.    Past Surgical History  She has a past surgical history that includes Cardiac catheterization (N/A,  "3/21/2025).    Social History  She reports that she quit smoking about 6 months ago. Her smoking use included cigarettes. She has never used smokeless tobacco. She reports that she does not currently use alcohol. She reports that she does not use drugs.    Family History  Family History[1]    Allergies  Lisinopril    Outpatient Medications  Current Outpatient Medications   Medication Instructions    acetaminophen (TYLENOL) 650 mg, oral, Every 6 hours PRN    aspirin 81 mg, oral, Daily    carvedilol (COREG) 12.5 mg, oral, 2 times daily    cholecalciferol (VITAMIN D3) 1,000 Units, oral, Daily    docusate sodium (COLACE) 100 mg, oral, 2 times daily PRN    empagliflozin (JARDIANCE) 25 mg, oral, Daily    iron polysaccharides (NU-IRON,NIFEREX) 150 mg, oral, Daily    isosorbide mononitrate ER (IMDUR) 30 mg, oral, Daily    nicotine polacrilex (NICORETTE) 4 mg, Mouth/Throat, As needed    rosuvastatin (CRESTOR) 40 mg, oral, Daily    valsartan (DIOVAN) 80 mg, oral, Daily RT    vitamin B complex-vitamin C-folic acid (Nephrocaps) 1 mg capsule 1 capsule, oral, Daily       Review of Systems  Review of Systems   Constitutional: Negative for malaise/fatigue.   Cardiovascular:  Positive for dyspnea on exertion. Negative for chest pain, claudication, irregular heartbeat, leg swelling, near-syncope, orthopnea, palpitations, paroxysmal nocturnal dyspnea and syncope.   Respiratory:  Negative for shortness of breath.      Last Recorded Vitals  Vitals:    06/23/25 1309   BP: 108/66   BP Location: Left arm   Patient Position: Sitting   BP Cuff Size: Adult   Pulse: 84   SpO2: 99%   Weight: 59.8 kg (131 lb 12.8 oz)   Height: 1.581 m (5' 2.25\")     Physical Examination  General: -American woman  HEENT: Normocephalic atraumatic,  Neck: Normal carotid arterial pulses, no arterial bruits, no thyromegaly.  Cardiac: Regular rhythm and normal heart rate.  S1, S2 present and normal.  No murmurs, rubs, or gallops.  PMI is nondisplaced. Jugular " "venous pulsations are normal.  Pulmonary: Normal breath sounds, no increased work of breathing, no wheezes or crackles.  GI: Normal bowel sounds, abdominal aorta not enlarged, no hepatosplenomegaly, no abdominal bruits.  Lower extremities: No cyanosis, clubbing, or edema.  No xanthelasma present. Normal distal pulses.  Skin: Skin intact. No significant rashes or lesions present.  Neuro: Alert and oriented x 3, normal attention and cognition, no focal motor or sensory neurologic deficits.  Psych: Normal affect and mood.  Musculoskeletal: Normal gait normal muscle tone.    Laboratory Studies  Lab Results   Component Value Date    GLUCOSE 88 06/23/2025    CALCIUM 9.2 06/23/2025     06/23/2025    K 4.6 06/23/2025    CO2 24 06/23/2025     06/23/2025    BUN 36 (H) 06/23/2025    CREATININE 1.80 (H) 06/23/2025     Lab Results   Component Value Date    ALT 18 06/23/2025    AST 22 06/23/2025    ALKPHOS 45 06/23/2025    BILITOT 0.3 06/23/2025         Lab Results   Component Value Date    CHOL 149 06/23/2025    CHOL 165 03/19/2025    CHOL 183 08/26/2024     Lab Results   Component Value Date    HDL 46 (L) 06/23/2025    HDL 43.7 03/19/2025    HDL 51.0 08/26/2024     Lab Results   Component Value Date    LDLCALC 76 06/23/2025    LDLCALC 85 03/19/2025    LDLCALC 103 (H) 08/26/2024     Lab Results   Component Value Date    TRIG 170 (H) 06/23/2025    TRIG 183 (H) 03/19/2025    TRIG 147 08/26/2024     No components found for: \"CHOLHDL\"  Lab Results   Component Value Date    HGBA1C 6.0 (H) 06/23/2025     No components found for: \"UACR\"    Cardiology Tests  ECG:   Electrocardiogram, 12-lead 03/24/2025    Echo:   TTE 3/20/205   1. Left ventricular ejection fraction is low normal, by visual estimate at 50-55%.   2. Spectral Doppler shows a Grade I (impaired relaxation pattern) of left ventricular diastolic filling with normal left atrial filling pressure.   3. There is normal right ventricular global systolic function.   4. " Right ventricular systolic pressure is within normal limits.    Cath:   Cardiac Catheterization Procedure 03/21/2025   1. CAD. Preserved LV systolic function (by echo). NYHA Class II.   2. Angiographically: Severe triple vessel disease involving proximal LAD in a right dominant circulation.   3. LVEDP 20 mmHg.      I personally reviewed the imaging studies and reviewed them with the patient       Assessment and Plan  Cornelia Macias is a 65 y.o. female with history of multivessel CAD status post CABG (LIMA to LAD, SVG to PDA, SVG to OM), posterior pericardial window, and KATHERINE C on 3/26/2025.  She presents today for a follow-up visit. Overall, her incision is healing well. Euvolemic and warm on exam.     List of Active Cardiac Issues:  # multivessel CAD status post CABG (LIMA to LAD, SVG to PDA, SVG to OM), posterior pericardial window, and LAAC on 3/26/2025. She was discharged on 4/1/2025.   - Continue lifelong ASA   - Discussed with Dr. Lopez    # AHA/ACC Stage B CMP: LVEF 45-55%. Likely ischemic in nature.  No HF sx. She is on valsartan 80mg daily, empagliflozin, carvedilol 12.5mg BID.   - Repeat TTE before next visit     # Hyperlipidemia: , HDL 46, LDL 76,  -   - Switch atorvastatin to rosuvastatin due to muscle pain leading to inconsistent use.   - Consider Zetia next visit   - Consider vascepa if TG is elevated next visit     # HTN: BP is well-controlled on medications as above.     # Prediabetes: A1c 6% - on jardiance.    RTC: 3 months   Problem List Items Addressed This Visit          Cardiac and Vasculature    Hypertension    Relevant Medications    empagliflozin (Jardiance) 25 mg tablet    carvedilol (Coreg) 12.5 mg tablet    aspirin 81 mg EC tablet    rosuvastatin (Crestor) 40 mg tablet    Other Relevant Orders    Lipid panel (Completed)    Hemoglobin A1C (Completed)    Comprehensive metabolic panel (Completed)    Chronic coronary microvascular dysfunction - Primary    Relevant Medications     valsartan (Diovan) 80 mg tablet    empagliflozin (Jardiance) 25 mg tablet    isosorbide mononitrate ER (Imdur) 30 mg 24 hr tablet    carvedilol (Coreg) 12.5 mg tablet    aspirin 81 mg EC tablet    rosuvastatin (Crestor) 40 mg tablet    Other Relevant Orders    Lipid panel (Completed)    Hemoglobin A1C (Completed)    Comprehensive metabolic panel (Completed)       Endocrine/Metabolic    Type 2 diabetes mellitus    Relevant Medications    empagliflozin (Jardiance) 25 mg tablet    aspirin 81 mg EC tablet    rosuvastatin (Crestor) 40 mg tablet    Other Relevant Orders    Lipid panel (Completed)    Hemoglobin A1C (Completed)    Comprehensive metabolic panel (Completed)             Arden Amador MD           [1] No family history on file.

## 2025-08-07 ENCOUNTER — TELEMEDICINE (OUTPATIENT)
Dept: PRIMARY CARE | Facility: CLINIC | Age: 65
End: 2025-08-07
Payer: COMMERCIAL

## 2025-08-07 DIAGNOSIS — Z95.1 S/P CABG X 3: Primary | ICD-10-CM

## 2025-08-07 PROCEDURE — 4010F ACE/ARB THERAPY RXD/TAKEN: CPT | Performed by: INTERNAL MEDICINE

## 2025-08-07 PROCEDURE — G2211 COMPLEX E/M VISIT ADD ON: HCPCS | Performed by: INTERNAL MEDICINE

## 2025-08-07 PROCEDURE — 99212 OFFICE O/P EST SF 10 MIN: CPT | Performed by: INTERNAL MEDICINE

## 2025-08-07 RX ORDER — IRON POLYSACCHARIDE COMPLEX 150 MG
150 CAPSULE ORAL DAILY
Qty: 90 CAPSULE | Refills: 1 | Status: SHIPPED | OUTPATIENT
Start: 2025-08-07 | End: 2026-08-07

## 2025-08-11 ASSESSMENT — ENCOUNTER SYMPTOMS
NAUSEA: 0
CHILLS: 0
ABDOMINAL PAIN: 0
FEVER: 0
VOMITING: 0
SHORTNESS OF BREATH: 0

## 2025-08-19 ENCOUNTER — TELEPHONE (OUTPATIENT)
Dept: PRIMARY CARE | Facility: CLINIC | Age: 65
End: 2025-08-19
Payer: COMMERCIAL

## 2025-08-19 DIAGNOSIS — Z95.1 S/P CABG X 3: ICD-10-CM

## 2025-08-27 ENCOUNTER — APPOINTMENT (OUTPATIENT)
Dept: PRIMARY CARE | Facility: CLINIC | Age: 65
End: 2025-08-27
Payer: COMMERCIAL

## 2025-08-27 ENCOUNTER — APPOINTMENT (OUTPATIENT)
Dept: NUTRITION | Facility: CLINIC | Age: 65
End: 2025-08-27
Payer: COMMERCIAL

## 2025-09-02 ENCOUNTER — TELEPHONE (OUTPATIENT)
Dept: CARDIAC REHAB | Facility: HOSPITAL | Age: 65
End: 2025-09-02
Payer: COMMERCIAL

## 2025-09-02 DIAGNOSIS — Z95.1 S/P CABG X 3: Primary | ICD-10-CM

## 2025-09-04 ENCOUNTER — TELEPHONE (OUTPATIENT)
Dept: CARDIAC REHAB | Facility: HOSPITAL | Age: 65
End: 2025-09-04
Payer: COMMERCIAL

## 2025-09-24 ENCOUNTER — APPOINTMENT (OUTPATIENT)
Dept: PRIMARY CARE | Facility: CLINIC | Age: 65
End: 2025-09-24
Payer: COMMERCIAL

## 2025-09-29 ENCOUNTER — APPOINTMENT (OUTPATIENT)
Dept: CARDIOLOGY | Facility: HOSPITAL | Age: 65
End: 2025-09-29
Payer: COMMERCIAL

## (undated) DEVICE — TRAY, SURESTEP, URINE METER, 14FR, SILICONE

## (undated) DEVICE — KIT, BLOWER / MISTER II

## (undated) DEVICE — BANDAGE, ELASTIC, ACE, ACE, DOUBLE LENGTH, 6 X 550 IN, LF

## (undated) DEVICE — BLADE, SAW STERNUM, STERILE

## (undated) DEVICE — DRAIN, CHANNEL, BLAKE, HUBLESS, ROUND, 28FR

## (undated) DEVICE — CANNULA, CARDIAC SUMP

## (undated) DEVICE — APPLICATOR, CHLORAPREP, W/ORANGE TINT, 26ML

## (undated) DEVICE — NITINOL KIT, GLIDESHEATH, 6FR

## (undated) DEVICE — BAG, DECANTER

## (undated) DEVICE — ADAPTER, Y, CORONARY PERFUSION

## (undated) DEVICE — MONITORING KIT, TRANSDUCER, RETROGRADE, MPS, W/EXTENSION, LF

## (undated) DEVICE — GOWN, ASTOUND, L

## (undated) DEVICE — SUTURE, PROLENE, 6-0, 30 IN, C-1, CV-11, BLUE

## (undated) DEVICE — OXYGENATOR FX 25, W/HR, ARTERIAL FILTER

## (undated) DEVICE — SUTURE, PROLENE, 4-0, 36 IN, BB, BLUE

## (undated) DEVICE — TR BAND, RADIAL COMPRESSION, STANDARD, 24CM

## (undated) DEVICE — SUTURE, VICRYL, 4-0, 27 IN, KS, UNDYED

## (undated) DEVICE — MARKER, SKIN, DUAL TIP INK W/9 LABEL AND REMOVABLE TIME OUT SLEEVE

## (undated) DEVICE — Device

## (undated) DEVICE — PUNCH, AORTIC 4MM

## (undated) DEVICE — TIP, SUCTION, YANKAUER, W/O VENT, FLEXIBLE, OPEN TIP, HIGH CAPACITY

## (undated) DEVICE — DRESSING, ADHESIVE, ISLAND, TELFA, 2 X 3.75 IN, LF

## (undated) DEVICE — COVER, CART, 45 X 27 X 48 IN, CLEAR

## (undated) DEVICE — SUTURE, VICRYL 0, TAPER POINT, CT-1 VIOLET 27 INCH

## (undated) DEVICE — CANNULA, VESSEL, FREE FLOW, BLUNT TIP, 3 MM X 5 CM

## (undated) DEVICE — SUTURE, ETHIBOND, XTRA, 30 IN, 0, CT-1, GREEN

## (undated) DEVICE — SYRINGE, LUER LOCK, 12ML

## (undated) DEVICE — DRESSING, ADHESIVE, ISLAND, TELFA, 4 X 14 IN

## (undated) DEVICE — TUBE SET, PNEUMOCLEAR, SMOKE EVACU, HIGH-FLOW

## (undated) DEVICE — MANIFOLD, 4 PORT NEPTUNE STANDARD

## (undated) DEVICE — SUTURE, MONOCRYL, 3-0, 18 IN, PS2, UNDYED

## (undated) DEVICE — CLIPPER, SURGICAL BLADE ASSEMBLY, GENERAL PURPOSE, SINGLE USE

## (undated) DEVICE — SUTURE, PROLENE, 4-0, TAPER POINT, SH/SH BLUE 36IN

## (undated) DEVICE — SPONGE, HEMOSTAT, SURGICEL FIBRILLAR, ABS, 4 X 4, LF

## (undated) DEVICE — CLIP, LIGATING, W/ADHESIVE PAD, MEDIUM, TITANIUM

## (undated) DEVICE — CONNECTOR, STRAIGHT, 0.5 X 0.5 IN

## (undated) DEVICE — SUTURE, ETHIBOND, XTRA, 30 IN, 0, CTX, GREEN

## (undated) DEVICE — DRESSING, MEPILEX, BORDER, HEEL, 8.7 X 9.1 IN

## (undated) DEVICE — GUIDEWIRE, INQWIRE, 3MM J, .035 X 210CM, FIXED

## (undated) DEVICE — INSERT, CLAMP, SURGICAL, SOFT/TRACTION, STEALTH, 5 MM

## (undated) DEVICE — SUTURE, SILK, 1, 30 IN, LABYRINTH, BLACK

## (undated) DEVICE — PAD, ELECTRODE DEFIB PADPRO ADULT STRL W/ADAPTER

## (undated) DEVICE — DRAPE, FLUID WARMER

## (undated) DEVICE — PLEDGET, PTFE, SOFT, LARGE, 3/8 X 3/16 X 1/16 IN

## (undated) DEVICE — GEL, ULTRASOUND, AQUASONIC 100, 20 GM, STERILE

## (undated) DEVICE — SUTURE, PROLENE, 7-0, 30 IN, BV1, DA, BLUE

## (undated) DEVICE — CLIP, LIGATING, W/ADHESIVE, WIDE SLOT, SMALL, TITANIUM

## (undated) DEVICE — CASSETTE, BLOOD, PLEGIC SET

## (undated) DEVICE — CANNULA, ARTERIAL, ONE PIECE, ELONGATED, VENTED, STRAIGHT, ADULT, 18 FR X 30.5 CM

## (undated) DEVICE — TOWEL, OR XRAY, RFID DETECT, WHITE, 17X26, STERILE

## (undated) DEVICE — KIT, TOURNIQUET, 7"

## (undated) DEVICE — COLLECTION UNIT, DRAINAGE, THORACIC, SINGLE TUBE, DRY SUCTION, ATS COMPATIBLE, OASIS 3600, LF

## (undated) DEVICE — MICROCOAGULATION TEST, ACT+ TEST CUVETTE

## (undated) DEVICE — TUBING PACK, OXYGENATOR, ADULT

## (undated) DEVICE — DRAPE, SHEET, CARDIOVASCULAR, ANTIMICROBIAL, W/ANESTHESIA SCREEN, IOBAN 2, STERI DRAPE, 107 X 133 IN, DISPOSABLE, FABRIC, BLUE, STERILE

## (undated) DEVICE — SUTURE, PROLENE, 3-0, 36 IN, SH, DA, BLUE

## (undated) DEVICE — CATHETER, THORACIC, STRAIGHT, ADULT, 28 FR, PVC

## (undated) DEVICE — SHUNT, SENSOR

## (undated) DEVICE — DRESSING, MEPILEX, BORDER, SACRUM, 8.7 X 9.8 IN

## (undated) DEVICE — SYRINGE, 20 CC, LUER LOCK, MONOJECT, W/O CAP, LF

## (undated) DEVICE — PACING CABLE, EXTENSION, 12 FT BEIGE, DISPOSABLE

## (undated) DEVICE — CANNULA, VENOUS, 2-STAGE, 32/40 ROUND

## (undated) DEVICE — CANNULA, AORTIC ROOT, 12G

## (undated) DEVICE — SUTURE, SILK, 4-0, 18 IN, LABYRINTH, BLACK

## (undated) DEVICE — CATHETER, DRAINAGE, NASOGASTRIC, DOUBLE LUMEN, FUNNEL END, SUMP, SALEM, 18 FR, 48 IN, PVC, STERILE

## (undated) DEVICE — SUTURE, VICRYL, 2-0, 27 IN, CT-1, VIOLET

## (undated) DEVICE — SYRINGE, VASOSHIELD, PRESSURE CONTROLLING

## (undated) DEVICE — CLIP, LIGATING, HORIZON, LARGE, TITANIUM

## (undated) DEVICE — TUBING, SUCTION, CARDIAC, 6 FR

## (undated) DEVICE — CATHETER, OPTITORQUE, 5FR, TIG, 1H/100CM

## (undated) DEVICE — DRESSING, ISLAND, TELFA, 4 X 5 IN

## (undated) DEVICE — CLIP, SPRING, BULLDOG, FOGARTY, SOFT JAW, 6 MM, LF